# Patient Record
Sex: MALE | Race: BLACK OR AFRICAN AMERICAN | NOT HISPANIC OR LATINO | Employment: FULL TIME | URBAN - METROPOLITAN AREA
[De-identification: names, ages, dates, MRNs, and addresses within clinical notes are randomized per-mention and may not be internally consistent; named-entity substitution may affect disease eponyms.]

---

## 2017-01-24 ENCOUNTER — OFFICE VISIT - HEALTHEAST (OUTPATIENT)
Dept: FAMILY MEDICINE | Facility: CLINIC | Age: 37
End: 2017-01-24

## 2017-01-24 ENCOUNTER — RECORDS - HEALTHEAST (OUTPATIENT)
Dept: GENERAL RADIOLOGY | Facility: CLINIC | Age: 37
End: 2017-01-24

## 2017-01-24 DIAGNOSIS — M79.673 HEEL PAIN: ICD-10-CM

## 2017-01-24 DIAGNOSIS — M79.673 PAIN IN UNSPECIFIED FOOT: ICD-10-CM

## 2017-01-27 ENCOUNTER — AMBULATORY - HEALTHEAST (OUTPATIENT)
Dept: ADMINISTRATIVE | Facility: REHABILITATION | Age: 37
End: 2017-01-27

## 2017-01-27 ENCOUNTER — OFFICE VISIT - HEALTHEAST (OUTPATIENT)
Dept: PHYSICAL THERAPY | Facility: REHABILITATION | Age: 37
End: 2017-01-27

## 2017-01-27 DIAGNOSIS — M79.672 LEFT FOOT PAIN: ICD-10-CM

## 2017-01-27 DIAGNOSIS — M25.671 LIMITATION OF JOINT MOTION OF ANKLE, RIGHT: ICD-10-CM

## 2017-01-27 DIAGNOSIS — M25.672 LIMITATION OF JOINT MOTION OF ANKLE, LEFT: ICD-10-CM

## 2017-01-27 DIAGNOSIS — M72.2 PLANTAR FASCIITIS: ICD-10-CM

## 2017-01-30 ENCOUNTER — OFFICE VISIT - HEALTHEAST (OUTPATIENT)
Dept: PHYSICAL THERAPY | Facility: REHABILITATION | Age: 37
End: 2017-01-30

## 2017-01-30 DIAGNOSIS — M79.672 LEFT FOOT PAIN: ICD-10-CM

## 2017-01-30 DIAGNOSIS — M25.671 LIMITATION OF JOINT MOTION OF ANKLE, RIGHT: ICD-10-CM

## 2017-01-30 DIAGNOSIS — M25.672 LIMITATION OF JOINT MOTION OF ANKLE, LEFT: ICD-10-CM

## 2017-02-08 ENCOUNTER — OFFICE VISIT - HEALTHEAST (OUTPATIENT)
Dept: PHYSICAL THERAPY | Facility: REHABILITATION | Age: 37
End: 2017-02-08

## 2017-02-08 DIAGNOSIS — M25.672 LIMITATION OF JOINT MOTION OF ANKLE, LEFT: ICD-10-CM

## 2017-02-08 DIAGNOSIS — M25.671 LIMITATION OF JOINT MOTION OF ANKLE, RIGHT: ICD-10-CM

## 2017-02-08 DIAGNOSIS — M79.672 LEFT FOOT PAIN: ICD-10-CM

## 2017-02-27 ENCOUNTER — OFFICE VISIT - HEALTHEAST (OUTPATIENT)
Dept: FAMILY MEDICINE | Facility: CLINIC | Age: 37
End: 2017-02-27

## 2017-02-27 ENCOUNTER — AMBULATORY - HEALTHEAST (OUTPATIENT)
Dept: LAB | Facility: CLINIC | Age: 37
End: 2017-02-27

## 2017-02-27 DIAGNOSIS — Z13.9 SCREENING: ICD-10-CM

## 2017-02-27 DIAGNOSIS — R09.81 NASAL CONGESTION: ICD-10-CM

## 2017-02-27 DIAGNOSIS — E66.9 OBESITY, UNSPECIFIED: ICD-10-CM

## 2017-02-27 DIAGNOSIS — Z88.9 MULTIPLE ALLERGIES: ICD-10-CM

## 2017-02-27 DIAGNOSIS — Z00.00 ROUTINE GENERAL MEDICAL EXAMINATION AT A HEALTH CARE FACILITY: ICD-10-CM

## 2017-02-27 DIAGNOSIS — M72.2 PLANTAR FASCIITIS: ICD-10-CM

## 2017-02-27 LAB
CHOLEST SERPL-MCNC: 217 MG/DL
FASTING STATUS PATIENT QL REPORTED: YES
HDLC SERPL-MCNC: 34 MG/DL
LDLC SERPL CALC-MCNC: 158 MG/DL
TRIGL SERPL-MCNC: 125 MG/DL

## 2017-02-27 ASSESSMENT — MIFFLIN-ST. JEOR: SCORE: 2345.11

## 2017-03-01 ENCOUNTER — OFFICE VISIT - HEALTHEAST (OUTPATIENT)
Dept: FAMILY MEDICINE | Facility: CLINIC | Age: 37
End: 2017-03-01

## 2017-03-01 DIAGNOSIS — M72.2 PLANTAR FASCIITIS: ICD-10-CM

## 2017-03-08 ENCOUNTER — COMMUNICATION - HEALTHEAST (OUTPATIENT)
Dept: FAMILY MEDICINE | Facility: CLINIC | Age: 37
End: 2017-03-08

## 2017-03-13 ENCOUNTER — COMMUNICATION - HEALTHEAST (OUTPATIENT)
Dept: FAMILY MEDICINE | Facility: CLINIC | Age: 37
End: 2017-03-13

## 2017-03-13 DIAGNOSIS — R05.9 COUGH: ICD-10-CM

## 2017-03-14 ENCOUNTER — OFFICE VISIT - HEALTHEAST (OUTPATIENT)
Dept: FAMILY MEDICINE | Facility: CLINIC | Age: 37
End: 2017-03-14

## 2017-03-14 DIAGNOSIS — R50.9 FEVER: ICD-10-CM

## 2017-03-14 DIAGNOSIS — J11.1 INFLUENZA: ICD-10-CM

## 2017-03-14 DIAGNOSIS — J02.9 SORETHROAT: ICD-10-CM

## 2017-03-29 ENCOUNTER — OFFICE VISIT - HEALTHEAST (OUTPATIENT)
Dept: PODIATRY | Facility: CLINIC | Age: 37
End: 2017-03-29

## 2017-03-29 DIAGNOSIS — M72.2 PLANTAR FASCIITIS: ICD-10-CM

## 2017-03-29 DIAGNOSIS — B35.3 TINEA PEDIS, UNSPECIFIED LATERALITY: ICD-10-CM

## 2017-06-06 ENCOUNTER — COMMUNICATION - HEALTHEAST (OUTPATIENT)
Dept: HEALTH INFORMATION MANAGEMENT | Facility: CLINIC | Age: 37
End: 2017-06-06

## 2017-06-06 ENCOUNTER — COMMUNICATION - HEALTHEAST (OUTPATIENT)
Dept: TELEHEALTH | Facility: CLINIC | Age: 37
End: 2017-06-06

## 2017-08-30 ENCOUNTER — OFFICE VISIT - HEALTHEAST (OUTPATIENT)
Dept: FAMILY MEDICINE | Facility: CLINIC | Age: 37
End: 2017-08-30

## 2017-08-30 DIAGNOSIS — E78.5 DYSLIPIDEMIA: ICD-10-CM

## 2017-08-30 DIAGNOSIS — R51.9 HEADACHE: ICD-10-CM

## 2017-08-30 DIAGNOSIS — I10 HTN (HYPERTENSION): ICD-10-CM

## 2017-08-30 LAB
CHOLEST SERPL-MCNC: 226 MG/DL
FASTING STATUS PATIENT QL REPORTED: YES
HDLC SERPL-MCNC: 36 MG/DL
LDLC SERPL CALC-MCNC: 171 MG/DL
TRIGL SERPL-MCNC: 95 MG/DL

## 2017-09-14 ENCOUNTER — AMBULATORY - HEALTHEAST (OUTPATIENT)
Dept: NURSING | Facility: CLINIC | Age: 37
End: 2017-09-14

## 2017-09-14 DIAGNOSIS — I10 HTN (HYPERTENSION): ICD-10-CM

## 2017-09-27 ENCOUNTER — COMMUNICATION - HEALTHEAST (OUTPATIENT)
Dept: FAMILY MEDICINE | Facility: CLINIC | Age: 37
End: 2017-09-27

## 2017-09-27 DIAGNOSIS — I10 HTN (HYPERTENSION): ICD-10-CM

## 2017-10-04 ENCOUNTER — OFFICE VISIT - HEALTHEAST (OUTPATIENT)
Dept: FAMILY MEDICINE | Facility: CLINIC | Age: 37
End: 2017-10-04

## 2017-10-04 DIAGNOSIS — I10 HTN (HYPERTENSION): ICD-10-CM

## 2017-10-10 ENCOUNTER — COMMUNICATION - HEALTHEAST (OUTPATIENT)
Dept: HEALTH INFORMATION MANAGEMENT | Facility: CLINIC | Age: 37
End: 2017-10-10

## 2017-10-13 ENCOUNTER — COMMUNICATION - HEALTHEAST (OUTPATIENT)
Dept: FAMILY MEDICINE | Facility: CLINIC | Age: 37
End: 2017-10-13

## 2017-10-13 DIAGNOSIS — I10 HTN (HYPERTENSION): ICD-10-CM

## 2017-11-17 ENCOUNTER — COMMUNICATION - HEALTHEAST (OUTPATIENT)
Dept: FAMILY MEDICINE | Facility: CLINIC | Age: 37
End: 2017-11-17

## 2017-11-17 DIAGNOSIS — E78.5 DYSLIPIDEMIA: ICD-10-CM

## 2018-03-13 ENCOUNTER — OFFICE VISIT - HEALTHEAST (OUTPATIENT)
Dept: FAMILY MEDICINE | Facility: CLINIC | Age: 38
End: 2018-03-13

## 2018-03-13 ENCOUNTER — COMMUNICATION - HEALTHEAST (OUTPATIENT)
Dept: SCHEDULING | Facility: CLINIC | Age: 38
End: 2018-03-13

## 2018-03-13 DIAGNOSIS — R09.82 PND (POST-NASAL DRIP): ICD-10-CM

## 2018-03-13 DIAGNOSIS — J02.9 SORE THROAT: ICD-10-CM

## 2018-03-13 DIAGNOSIS — R05.3 COUGH, PERSISTENT: ICD-10-CM

## 2018-03-13 DIAGNOSIS — J00 ACUTE NASOPHARYNGITIS: ICD-10-CM

## 2018-03-13 LAB — DEPRECATED S PYO AG THROAT QL EIA: NORMAL

## 2018-03-14 LAB — GROUP A STREP BY PCR: NORMAL

## 2018-04-03 ENCOUNTER — OFFICE VISIT - HEALTHEAST (OUTPATIENT)
Dept: FAMILY MEDICINE | Facility: CLINIC | Age: 38
End: 2018-04-03

## 2018-04-03 DIAGNOSIS — Z91.09 ENVIRONMENTAL ALLERGIES: ICD-10-CM

## 2018-04-03 DIAGNOSIS — R07.0 THROAT PAIN: ICD-10-CM

## 2018-04-03 DIAGNOSIS — I10 HTN (HYPERTENSION): ICD-10-CM

## 2018-04-17 ENCOUNTER — RECORDS - HEALTHEAST (OUTPATIENT)
Dept: ADMINISTRATIVE | Facility: OTHER | Age: 38
End: 2018-04-17

## 2018-05-10 ENCOUNTER — COMMUNICATION - HEALTHEAST (OUTPATIENT)
Dept: FAMILY MEDICINE | Facility: CLINIC | Age: 38
End: 2018-05-10

## 2018-05-10 DIAGNOSIS — I10 HTN (HYPERTENSION): ICD-10-CM

## 2018-08-03 ENCOUNTER — OFFICE VISIT - HEALTHEAST (OUTPATIENT)
Dept: FAMILY MEDICINE | Facility: CLINIC | Age: 38
End: 2018-08-03

## 2018-08-03 DIAGNOSIS — M72.2 PLANTAR FASCIITIS: ICD-10-CM

## 2018-08-06 ENCOUNTER — COMMUNICATION - HEALTHEAST (OUTPATIENT)
Dept: SCHEDULING | Facility: CLINIC | Age: 38
End: 2018-08-06

## 2018-08-06 ENCOUNTER — COMMUNICATION - HEALTHEAST (OUTPATIENT)
Dept: FAMILY MEDICINE | Facility: CLINIC | Age: 38
End: 2018-08-06

## 2018-08-14 ENCOUNTER — OFFICE VISIT (OUTPATIENT)
Dept: PODIATRY | Facility: CLINIC | Age: 38
End: 2018-08-14
Payer: COMMERCIAL

## 2018-08-14 VITALS
DIASTOLIC BLOOD PRESSURE: 70 MMHG | SYSTOLIC BLOOD PRESSURE: 124 MMHG | BODY MASS INDEX: 47.74 KG/M2 | HEIGHT: 68 IN | WEIGHT: 315 LBS

## 2018-08-14 DIAGNOSIS — M79.671 INTRACTABLE RIGHT HEEL PAIN: Primary | ICD-10-CM

## 2018-08-14 PROCEDURE — 99204 OFFICE O/P NEW MOD 45 MIN: CPT | Performed by: PODIATRIST

## 2018-08-14 RX ORDER — PREDNISONE 5 MG/1
TABLET ORAL
Qty: 21 TABLET | Refills: 0 | Status: SHIPPED | OUTPATIENT
Start: 2018-08-14 | End: 2021-09-21

## 2018-08-14 NOTE — MR AVS SNAPSHOT
After Visit Summary   8/14/2018    Ilan Matthew    MRN: [id number]           Patient Information     Date Of Birth          1980        Visit Information        Provider Department      8/14/2018 1:15 PM Rishi Ackerman DPM [facility] [place] [facility]        Today's Diagnoses     Intractable right heel pain    -  1      Care Instructions    Thank you for choosing [facility]!    DR. ACKERMAN'S CLINIC LOCATIONS:   MONDAY - [place] TUESDAY - [place]   [address] [facility]  [address]   [place] [address] [place] [address]   [phone]       THURSDAY AM - [place] THURSDAY PM - [place]   [address]   [place] [address] [place], [place] [id number] [phone]       FRIDAY AM - [place] SET UP SURGERY: [phone] [address] APPOINTMENTS: [phone]   [place] [address] BILLING QUESTIONS: [phone] FAX NUMBER: [phone]     Follow Up: as needed    [facility] LOCATIONS  [facility]  [address]  [place] [address]  Phone: [phone]  Fax: [phone] [facility]  [address]  [place] [address]  Phone: [phone]   Fax: [phone]   [facility] [place]  [address]  [place] [address]  Phone: [phone]  Fax: [phone] [facility]  [address]  [place] [address]  Phone: [phone]   Fax: [phone]   [facility]   [address]  [place] [address]  Phone: [phone]   Fax: [phone] * Please call any location listed to make an appointment for a casting/fitting. Your referral was sent to their central office and they will all have the order on file.       PLANTAR FASCIITIS  Plantar fasciitis is often referred to as heel spurs or heel pain. Plantar fasciitis is a very  common problem that affects people of all foot shapes, age, weight and activity level. Pain may be in the arch or on the weight-bearing surface of the heel. The pain may come on without injury or identifiable cause. Pain is generally present when first getting out of bed in the morning or up from a seated break.     CAUSES  The plantar fascia is a dense fibrous band of tissue that stretches across the bottom surface of the foot. The fascia helps support the foot muscles and arch. Plantar fasciitis is thought to be caused by mechanical strain or overload. Frequent walking without shoes or wearing unsupportive shoes is thought to cause structural overload and ultimately inflammation of the plantar fascia. Some people have heel spurs that can be seen on x-ray. The heel spur is actually a minor component of plantar fascitis and is largely ignored.       SELF TREATMENT   The easiest solution is to stop walking around your home without shoes. Plantar fasciitis is largely a shoe problem. Shoes are either not being worn often enough or your current shoes are inadequate for your weight, foot structure or activity level. The majority of shoes on the market today are not sufficient to resist development of plantar fasciitis or to promote healing. Assume that your current shoes are inadequate and will need to be replaced. Even high quality shoes wear out with 6 months to one year of frequent use. Weight loss is another option. Losing ten pounds in the next two months may be enough to resolve the problem. Ice applied to the area of pain two to three times per day for ten minutes each session can be very helpful. This should continue until the problem resolves. Achilles tendon stretching is essential. Stretch multiple times daily to promote healing and to prevent recurrence in the future.     MEDICAL TREATMENT  Medical treatments often include custom arch supports, cortisone injections, physical therapy, splints to be worn in  bed, prescription medications and surgery. The home treatments listed above will be necessary regardless of these advanced medical treatments. Surgery is rarely needed but is very helpful in selected cases.     PROGNOSIS  Plantar fasciitis can last from one day to a lifetime. Some people get intermittent fascitis that is very short-lived. Others suffer daily for years. Excessive body weight, frequent bare foot walking, long hours on the feet, inadequate shoes, predisposing foot structures and excessive activity such as running are all potential issues that lead to chronic and/or recurring plantar fascitis. Having plantar fasciitis means that you are forever prone to this problem and will require modification of some of the above factors. Most people seek treatment within one to four months. Healing usually requires a similar one to four month time frame. Healing time is relative to the amount of effort spent treating the problem.   Plantar fasciitis is highly recurrent. Risk factors often continue, including return to bare foot walking, inadequate shoes, excessive body weight, excessive activities, etc. Your life style and foot structure may predispose you to recurrent plantar fasciitis. A daily prevention regimen can be very helpful. Ongoing use of shoe inserts, careful attention to appropriate shoes, daily Achilles stretching, etc. may prevent recurrence. Prompt attention at the earliest warning signs of heel pain can resolve the problem in as short as a few days.     EXERCISES    Stair Exercise: Step on the stairs with the ball of your foot and hold your position for at least 15 seconds, then slowly step down with the heels of your foot. You can do this daily and as often as you want.   Picking the Towel: Sit comfortably and then pick the towel up with your toes. You can use any object other than a towel as long as the material can be soft and you can pick it up with your toes.  Rolling the Bottle: Use a small  ball or frozen water bottle and then roll it around with your foot.   Flex the Toes: Sit comfortably and then flex your toes by pointing it towards the floor or towards your body. This will relax and flex your foot and exercise your plantar fascia, the calf, and the Achilles tendon. The inability of the foot to stretch often causes the bunching up of the plantar fascia area leading to the pain.  Calf/Achilles Stretching: Lay on you back and raise one foot, then point your toes towards the floor. See photo below:               Hold each stretch for 10 seconds. Stretch 10 times per set, three sets per day. Morning, afternoon and evening. If your heel pain is very severe in the morning, consider doing the first set of stretches before you get out of bed.    THERAPIES COVERED:  1.  Supportive Shoes: minimizing barefoot ambulation helps to provide cushion, padding and support to the ligament that is inflamed. Socks, flip flops, flats and some slippers are not typically sufficient to provide support. Shoes should be worn even indoors  2.  Insert/Orthotics: ones with an arch support built in to them provide further stress relief for the ligament. See the information below on recommended inserts.  3. Icing: using a frozen water bottle or orange, and rolling it along the bottom of the arch/heel can help to alleviate discomfort, and can act as a tissue massage to the painful, inflamed ligament.  There is evidence that shows icing at least three times daily can be beneficial  4.  Antiinflammatory (NSAID): Ibuprofen, Aleve, as well as Tylenol can be used to help decrease symptoms and improve pain levels. If you have high blood pressure, heart disease, stomach or kidney problems, use antiinflammatories sparingly. Tylenol should not be used if you have liver problems.   5. Activity Modifications: if there are certain things that you do, whether it's going barefoot or certain shoes/activities, you should try to minimize those  activities as much as possible until your symptoms are sufficiently resolved. Certainly, some activities, such as running on the treadmill, are easier to take a break from versus others, such as work or chores at home. If there are certain activities that hurt your heel, and you keep doing those activities that hurt your heel, your heel will keep hurting.  **If these initial therapies are insufficient, we have our tier 2 therapies that can more aggressively work to improve your symptoms and get you back to the activities that you enjoy!    OVER THE COUNTER INSERTS    SuperFeet   Sofsole Fit Spenco   Power Step   Walk-Fit Arch Cradles     Most of these can be found at your local [employer], sporting [employer] stores, or online.  **A good high quality over the counter insert should cost around $40-$50      [employer] [facility]  [address] [place] [address]  [phone]   [address]  [phone] [place]  [address]  [phone]   [place]  [address]  [phone]   [place]  [address]  [phone] [place]  [address]  [phone]   [place]  [address] [facility]  [phone]   [place]  [address] [facility] [address] [facility]  [phone] [place]  [address] [phone]           Body Mass Index (BMI)  Many things can cause foot and ankle problems. Foot structure, activity level, foot mechanics and injuries are common causes of pain. One very important issue that often goes unmentioned, is body weight. Extra weight can cause increased stress on muscles, ligaments, bones and tendons. Sometimes just a few extra pounds is all it takes to put one over her/his threshold. Without reducing that stress, it can be difficult to alleviate pain. Some people are uncomfortable addressing this issue, but we feel it is important for you to think about it. As Foot &  Ankle specialists, our job is addressing the lower extremity problem and  possible causes. Regarding extra body weight, we encourage patients to discuss diet and weight management plans with their primary care doctors. It is this team approach that gives you the best opportunity for pain relief and getting you back on your feet.            Follow-ups after your visit        Additional Services     ORTHOTICS REFERRAL       Please be aware that coverage of these services is subject to the terms and limitations of your health insurance plan.  Call member services at your health plan with any benefit or coverage questions.      Please bring the following to your appointment:    >>   Any x-rays, CTs or MRIs which have been performed.  Contact the facility where they were done to arrange for  prior to your scheduled appointment.  Any new CT, MRI or other procedures ordered by your specialist must be performed at a [facility] facility or coordinated by your clinic's referral office.    >>   List of current medications   >>   This referral request   >>   Any documents/labs given to you for this referral    ==This Referral PRINTS in the [facility] (ORTHOTICS & PROSTHETICS) Central scheduling office ==     The [facility] Central Scheduling staff will contact patient to arrange appointments. Central Scheduling Phone #:  [place], [place]  [phone]     Orthotics: Foot Orthotics - custom functional orthotics for heel/arch pain.                  Who to contact     If you have questions or need follow up information about today's clinic visit or your schedule please contact [facility] directly at [phone].  Normal or non-critical lab and imaging results will be communicated to you by MyChart, letter or phone within 4 business days after the clinic has received the results. If you do not hear from us within 7 days, please contact the clinic through MyChart or phone. If you have a critical or abnormal lab result, we will notify you by phone as soon as  "possible.  Submit refill requests through [employer] or call your pharmacy and they will forward the refill request to us. Please allow 3 business days for your refill to be completed.          Additional Information About Your Visit        [employer] Information     [employer] lets you send messages to your doctor, view your test results, renew your prescriptions, schedule appointments and more. To sign up, go to [email or web address] . Click on \"Log in\" on the left side of the screen, which will take you to the Welcome page. Then click on \"Sign up Now\" on the right side of the page.     You will be asked to enter the access code listed below, as well as some personal information. Please follow the directions to create your username and password.     Your access code is: [email or web address]  Expires: 2018  1:47 PM     Your access code will  in 90 days. If you need help or a new code, please call your [facility] clinic or [phone].        Care EveryWhere ID     This is your Care EveryWhere ID. This could be used by other organizations to access your [facility] medical records  [id number]        Your Vitals Were     Height BMI (Body Mass Index)                5' 8\" (1.727 m) 48.66 kg/m2           Blood Pressure from Last 3 Encounters:   18 124/70    Weight from Last 3 Encounters:   18 320 lb (145.2 kg)              We Performed the Following     ORTHOTICS REFERRAL          Today's Medication Changes          These changes are accurate as of 18  1:47 PM.  If you have any questions, ask your nurse or doctor.               Start taking these medicines.        Dose/Directions    predniSONE 5 MG tablet   Commonly known as:  DELTASONE   Used for:  Intractable right heel pain   Started by:  Rishi Ackerman DPM        Take six pills day 1, five pills day 2, four pills day 3, three pills day 4, two pills day 5, one pill day6   Quantity:  21 tablet   Refills:  0            Where to get your medicines    "   These medications were sent to [id number] - [facility], [place] - [address]  [address] [facility] [address]     Phone:  [phone]     predniSONE 5 MG tablet                Primary Care Provider Fax #    Physician No Ref-Primary [phone]       No address on file        Equal Access to Services     [facility] OHMERO : Hadii za ku hadasho Soomaali, waaxda luqadaha, qaybta kaalmada ademarisabelyada, trip wheatbrayansheron barreto. So [facility] [phone].    ATENCIÓN: Si habla español, tiene a rose disposición servicios gratuitos de asistencia lingüística. [facility] [phone].    We comply with applicable federal civil rights laws and [place] laws. We do not discriminate on the basis of race, color, national origin, age, disability, sex, sexual orientation, or gender identity.            Thank you!     Thank you for choosing [facility]  for your care. Our goal is always to provide you with excellent care. Hearing back from our patients is one way we can continue to improve our services. Please take a few minutes to complete the written survey that you may receive in the mail after your visit with us. Thank you!             Your Updated Medication List - Protect others around you: Learn how to safely use, store and throw away your medicines at www.disposemymeds.org.          This list is accurate as of 8/14/18  1:47 PM.  Always use your most recent med list.                   Brand Name Dispense Instructions for use Diagnosis    predniSONE 5 MG tablet    DELTASONE    21 tablet    Take six pills day 1, five pills day 2, four pills day 3, three pills day 4, two pills day 5, one pill day6    Intractable right heel pain

## 2018-08-14 NOTE — PATIENT INSTRUCTIONS
Thank you for choosing [facility]!    DR. FRENCH'S CLINIC LOCATIONS:   MONDAY - [place] TUESDAY - [place]   [address] [facility] [address] [address]   [place] [address] [place] [address]   [phone]       THURSDAY AM - [place] THURSDAY PM - [place]   [address] [facility] [address]   [place] [address] [place], [place] [address] [phone]       FRIDAY AM - [place] SET UP SURGERY: [phone] [address] APPOINTMENTS: [phone]   [place] [address] BILLING QUESTIONS: [phone] FAX NUMBER: [phone]     Follow Up: as needed    [facility] LOCATIONS  [facility]  [address] [facility] [address]  [place] [address]  Phone: [phone]  Fax: [phone] [facility]  [address]  [place] [address]  Phone: [phone]   Fax: [phone]   [facility]  [address]  [place] [address]  Phone: [phone]  Fax: [phone] [facility]  [address]  [place] [address]  Phone: [phone]   Fax: [phone]   [facility]   [address]  [place] [address]  Phone: [phone]   Fax: [phone] * Please call any location listed to make an appointment for a casting/fitting. Your referral was sent to their central office and they will all have the order on file.       PLANTAR FASCIITIS  Plantar fasciitis is often referred to as heel spurs or heel pain. Plantar fasciitis is a very common problem that affects people of all foot shapes, age, weight and activity level. Pain may be in the arch or on the weight-bearing surface of the heel. The pain may come on without injury or identifiable cause. Pain is generally present when first getting out of bed in the morning or up from a seated break.     CAUSES  The plantar fascia is a dense fibrous  band of tissue that stretches across the bottom surface of the foot. The fascia helps support the foot muscles and arch. Plantar fasciitis is thought to be caused by mechanical strain or overload. Frequent walking without shoes or wearing unsupportive shoes is thought to cause structural overload and ultimately inflammation of the plantar fascia. Some people have heel spurs that can be seen on x-ray. The heel spur is actually a minor component of plantar fascitis and is largely ignored.       SELF TREATMENT   The easiest solution is to stop walking around your home without shoes. Plantar fasciitis is largely a shoe problem. Shoes are either not being worn often enough or your current shoes are inadequate for your weight, foot structure or activity level. The majority of shoes on the market today are not sufficient to resist development of plantar fasciitis or to promote healing. Assume that your current shoes are inadequate and will need to be replaced. Even high quality shoes wear out with 6 months to one year of frequent use. Weight loss is another option. Losing ten pounds in the next two months may be enough to resolve the problem. Ice applied to the area of pain two to three times per day for ten minutes each session can be very helpful. This should continue until the problem resolves. Achilles tendon stretching is essential. Stretch multiple times daily to promote healing and to prevent recurrence in the future.     MEDICAL TREATMENT  Medical treatments often include custom arch supports, cortisone injections, physical therapy, splints to be worn in bed, prescription medications and surgery. The home treatments listed above will be necessary regardless of these advanced medical treatments. Surgery is rarely needed but is very helpful in selected cases.     PROGNOSIS  Plantar fasciitis can last from one day to a lifetime. Some people get intermittent fascitis that is very short-lived. Others suffer daily for  years. Excessive body weight, frequent bare foot walking, long hours on the feet, inadequate shoes, predisposing foot structures and excessive activity such as running are all potential issues that lead to chronic and/or recurring plantar fascitis. Having plantar fasciitis means that you are forever prone to this problem and will require modification of some of the above factors. Most people seek treatment within one to four months. Healing usually requires a similar one to four month time frame. Healing time is relative to the amount of effort spent treating the problem.   Plantar fasciitis is highly recurrent. Risk factors often continue, including return to bare foot walking, inadequate shoes, excessive body weight, excessive activities, etc. Your life style and foot structure may predispose you to recurrent plantar fasciitis. A daily prevention regimen can be very helpful. Ongoing use of shoe inserts, careful attention to appropriate shoes, daily Achilles stretching, etc. may prevent recurrence. Prompt attention at the earliest warning signs of heel pain can resolve the problem in as short as a few days.     EXERCISES    Stair Exercise: Step on the stairs with the ball of your foot and hold your position for at least 15 seconds, then slowly step down with the heels of your foot. You can do this daily and as often as you want.   Picking the Towel: Sit comfortably and then pick the towel up with your toes. You can use any object other than a towel as long as the material can be soft and you can pick it up with your toes.  Rolling the Bottle: Use a small ball or frozen water bottle and then roll it around with your foot.   Flex the Toes: Sit comfortably and then flex your toes by pointing it towards the floor or towards your body. This will relax and flex your foot and exercise your plantar fascia, the calf, and the Achilles tendon. The inability of the foot to stretch often causes the bunching up of the plantar  fascia area leading to the pain.  Calf/Achilles Stretching: Lay on you back and raise one foot, then point your toes towards the floor. See photo below:               Hold each stretch for 10 seconds. Stretch 10 times per set, three sets per day. Morning, afternoon and evening. If your heel pain is very severe in the morning, consider doing the first set of stretches before you get out of bed.    THERAPIES COVERED:  1.  Supportive Shoes: minimizing barefoot ambulation helps to provide cushion, padding and support to the ligament that is inflamed. Socks, flip flops, flats and some slippers are not typically sufficient to provide support. Shoes should be worn even indoors  2.  Insert/Orthotics: ones with an arch support built in to them provide further stress relief for the ligament. See the information below on recommended inserts.  3. Icing: using a frozen water bottle or orange, and rolling it along the bottom of the arch/heel can help to alleviate discomfort, and can act as a tissue massage to the painful, inflamed ligament.  There is evidence that shows icing at least three times daily can be beneficial  4.  Antiinflammatory (NSAID): Ibuprofen, Aleve, as well as Tylenol can be used to help decrease symptoms and improve pain levels. If you have high blood pressure, heart disease, stomach or kidney problems, use antiinflammatories sparingly. Tylenol should not be used if you have liver problems.   5. Activity Modifications: if there are certain things that you do, whether it's going barefoot or certain shoes/activities, you should try to minimize those activities as much as possible until your symptoms are sufficiently resolved. Certainly, some activities, such as running on the treadmill, are easier to take a break from versus others, such as work or chores at home. If there are certain activities that hurt your heel, and you keep doing those activities that hurt your heel, your heel will keep hurting.  **If these  initial therapies are insufficient, we have our tier 2 therapies that can more aggressively work to improve your symptoms and get you back to the activities that you enjoy!    OVER THE COUNTER INSERTS    SuperFeet   Sofsole Fit Spenco   Power Step   Walk-Fit Arch Cradles     Most of these can be found at your local [employer], sporting [employer] stores, or online.  **A good high quality over the counter insert should cost around $40-$50      [facility] LOCATIONS    [place]  [address] [facility]  [phone]   [address]  [phone] [place]  [address] [facility]  [phone]   [place]  [address]  [phone]   [place]  [address] [facility]  [phone] [place]  [address]  [phone]   [place]  [address] [place] [address]  [phone]   [place]  [address] [facility]  [phone] [place]  [address] [phone]           Body Mass Index (BMI)  Many things can cause foot and ankle problems. Foot structure, activity level, foot mechanics and injuries are common causes of pain. One very important issue that often goes unmentioned, is body weight. Extra weight can cause increased stress on muscles, ligaments, bones and tendons. Sometimes just a few extra pounds is all it takes to put one over her/his threshold. Without reducing that stress, it can be difficult to alleviate pain. Some people are uncomfortable addressing this issue, but we feel it is important for you to think about it. As Foot &  Ankle specialists, our job is addressing the lower extremity problem and possible causes. Regarding extra body weight, we encourage patients to discuss diet and weight management plans with their primary care doctors. It is this team approach that gives you the best opportunity for pain relief and getting you back on your feet.

## 2018-08-14 NOTE — PROGRESS NOTES
"Foot & Ankle Surgery  August 14, 2018    CC: right heel pain    I was asked to see Ilan Matthew regarding the chief complaint by:  Dr. Dhillon    HPI:  Pt is a 38 year old male who presents with above complaint.  Right heel pain x 7 months.  He's looking for a \"cortisone shot + orthodic\".  He states he received a steroid injection in his R \"ankle\" 2 weeks ago, indicating this was at the the bottom of the heel.  Pain was 10/10.  It's better now but still very tender and it changes how he walks.  Heel pain R , describes morning pain and post-static dyskinesia.  He had plantar fasciitis left lower extremity, xrays showed a plantar heel spur, states he had multiple injections in a short period of time and his pain improved.  Pain 7/10 \"when I walk\", worse with walking/exercise.     ROS:   Pos for CC.  The patient denies current nausea, vomiting, chills, fevers, belly pain, calf pain, chest pain or SOB.  Complete remainder of ROS is otherwise neg.    VITALS:    Vitals:    08/14/18 1323   BP: 124/70   Weight: 320 lb (145.2 kg)   Height: 5' 8\" (1.727 m)       PMH:  No past medical history on file.    SXHX:  No past surgical history on file.     MEDS:    No current outpatient prescriptions on file.     No current facility-administered medications for this visit.        ALL:   Not on File    FMH:  No family history on file.    SocHx:    Social History     Social History     Marital status:      Spouse name: N/A     Number of children: N/A     Years of education: N/A     Occupational History     Not on file.     Social History Main Topics     Smoking status: Never Smoker     Smokeless tobacco: Never Used     Alcohol use Not on file     Drug use: Not on file     Sexual activity: Not on file     Other Topics Concern     Not on file     Social History Narrative     No narrative on file           EXAMINATION:  Gen:   No apparent distress  Neuro:   A&Ox3, no deficits  Psych:    Answering questions appropriately for age and " situation with normal affect  Head:    NCAT  Eye:    Visual scanning without deficit  Ear:    Response to auditory stimuli wnl  Lung:    Non-labored breathing on RA noted  Abd:    NTND per patient report  Lymph:    Neg for pitting/non-pitting edema BLE  Vasc:    Pulses palpable, CFT minimally delayed  Neuro:    Light touch sensation intact to all sensory nerve distributions without paresthesias  Derm:    Neg for nodules, lesions or ulcerations  MSK:    Right lower extremity - pain with lateral calcaneal compression >> plantar heel pain.  Some pain along arch with dorsiflexion of hallux.  Pes planus.  Otherwise neg today  Calf:    Neg for redness, swelling or tenderness    Assessment:  38 year old male with calcaneal stress reaction with plantar fasciitis in setting of pes planus right lower extremity       Plan:  Discussed etiologies, anatomy and options  1.  Calcaneal stress reaction with plantar fasciitis in setting of pes planus right lower extremity   -Regarding the plantar fasciitis, the Plantar Fasciitis handout was dispensed and discussed.  We talked about stretching, resting/activity modification, icing, NSAID/tylenol use as tolerated, inserts, supportive/comfortable shoes and minimizing shoeless walking.    -discussed Achilles, plantar fascial and hamstring stretches  -I advised no injections today.  We discussed that typical protocol is spacing the injections out every 3-4 months, and since his last injection was 2 weeks ago, strongly advised no injection to minimize possibility of rupture  -regarding the calcaneal compression pain, discussed calcaneal stress reaction and how this wound not respond to a steroid injection.  Rather, advised PO steroid course and a walking cast boot.  -Rx for PO steroid course; declined boot  -Orthotic Lab referral for custom orthotics    Follow up:  Prn based on steroid results or sooner with acute issues      Patient's medical history was reviewed today    Body mass index  is 48.66 kg/(m^2).  Weight management plan: Patient was referred to their PCP to discuss a diet and exercise plan.        Rishi Ackerman DPM [facility] FACFA[facility]  Podiatric Foot & Ankle Surgeon  [facility]  [phone]

## 2018-08-27 DIAGNOSIS — M21.41 BILATERAL PES PLANUS: Primary | ICD-10-CM

## 2018-08-27 DIAGNOSIS — M21.42 BILATERAL PES PLANUS: Primary | ICD-10-CM

## 2019-02-28 ENCOUNTER — COMMUNICATION - HEALTHEAST (OUTPATIENT)
Dept: FAMILY MEDICINE | Facility: CLINIC | Age: 39
End: 2019-02-28

## 2019-02-28 DIAGNOSIS — E78.5 DYSLIPIDEMIA: ICD-10-CM

## 2019-02-28 DIAGNOSIS — I10 HTN (HYPERTENSION): ICD-10-CM

## 2019-03-26 ENCOUNTER — COMMUNICATION - HEALTHEAST (OUTPATIENT)
Dept: FAMILY MEDICINE | Facility: CLINIC | Age: 39
End: 2019-03-26

## 2019-03-26 DIAGNOSIS — I10 HTN (HYPERTENSION): ICD-10-CM

## 2019-04-03 ENCOUNTER — OFFICE VISIT - HEALTHEAST (OUTPATIENT)
Dept: FAMILY MEDICINE | Facility: CLINIC | Age: 39
End: 2019-04-03

## 2019-04-03 DIAGNOSIS — E78.5 DYSLIPIDEMIA: ICD-10-CM

## 2019-04-03 DIAGNOSIS — M72.2 PLANTAR FASCIITIS OF RIGHT FOOT: ICD-10-CM

## 2019-04-03 DIAGNOSIS — E66.01 MORBID OBESITY (H): ICD-10-CM

## 2019-04-03 DIAGNOSIS — Z00.00 ROUTINE GENERAL MEDICAL EXAMINATION AT A HEALTH CARE FACILITY: ICD-10-CM

## 2019-04-03 DIAGNOSIS — I10 ESSENTIAL HYPERTENSION: ICD-10-CM

## 2019-04-03 LAB
ALBUMIN SERPL-MCNC: 3.7 G/DL (ref 3.5–5)
ALP SERPL-CCNC: 89 U/L (ref 45–120)
ALT SERPL W P-5'-P-CCNC: 45 U/L (ref 0–45)
ANION GAP SERPL CALCULATED.3IONS-SCNC: 13 MMOL/L (ref 5–18)
AST SERPL W P-5'-P-CCNC: 43 U/L (ref 0–40)
BILIRUB SERPL-MCNC: 0.9 MG/DL (ref 0–1)
BUN SERPL-MCNC: 15 MG/DL (ref 8–22)
CALCIUM SERPL-MCNC: 9.4 MG/DL (ref 8.5–10.5)
CHLORIDE BLD-SCNC: 104 MMOL/L (ref 98–107)
CHOLEST SERPL-MCNC: 149 MG/DL
CO2 SERPL-SCNC: 23 MMOL/L (ref 22–31)
CREAT SERPL-MCNC: 1 MG/DL (ref 0.7–1.3)
FASTING STATUS PATIENT QL REPORTED: YES
GFR SERPL CREATININE-BSD FRML MDRD: >60 ML/MIN/1.73M2
GLUCOSE BLD-MCNC: 94 MG/DL (ref 70–125)
HDLC SERPL-MCNC: 39 MG/DL
LDLC SERPL CALC-MCNC: 98 MG/DL
POTASSIUM BLD-SCNC: 4 MMOL/L (ref 3.5–5)
PROT SERPL-MCNC: 6.9 G/DL (ref 6–8)
SODIUM SERPL-SCNC: 140 MMOL/L (ref 136–145)
TRIGL SERPL-MCNC: 61 MG/DL

## 2019-04-03 ASSESSMENT — MIFFLIN-ST. JEOR: SCORE: 2242.2

## 2019-04-09 ENCOUNTER — OFFICE VISIT - HEALTHEAST (OUTPATIENT)
Dept: FAMILY MEDICINE | Facility: CLINIC | Age: 39
End: 2019-04-09

## 2019-04-09 DIAGNOSIS — M72.2 PLANTAR FASCIITIS: ICD-10-CM

## 2019-04-10 ENCOUNTER — COMMUNICATION - HEALTHEAST (OUTPATIENT)
Dept: FAMILY MEDICINE | Facility: CLINIC | Age: 39
End: 2019-04-10

## 2019-04-11 ENCOUNTER — COMMUNICATION - HEALTHEAST (OUTPATIENT)
Dept: FAMILY MEDICINE | Facility: CLINIC | Age: 39
End: 2019-04-11

## 2019-05-04 ENCOUNTER — COMMUNICATION - HEALTHEAST (OUTPATIENT)
Dept: FAMILY MEDICINE | Facility: CLINIC | Age: 39
End: 2019-05-04

## 2019-05-04 DIAGNOSIS — I10 HTN (HYPERTENSION): ICD-10-CM

## 2019-05-22 ENCOUNTER — COMMUNICATION - HEALTHEAST (OUTPATIENT)
Dept: FAMILY MEDICINE | Facility: CLINIC | Age: 39
End: 2019-05-22

## 2019-05-22 DIAGNOSIS — I10 HTN (HYPERTENSION): ICD-10-CM

## 2019-05-22 DIAGNOSIS — E66.01 MORBID OBESITY (H): ICD-10-CM

## 2019-06-03 ENCOUNTER — COMMUNICATION - HEALTHEAST (OUTPATIENT)
Dept: FAMILY MEDICINE | Facility: CLINIC | Age: 39
End: 2019-06-03

## 2019-06-03 DIAGNOSIS — E66.01 MORBID OBESITY (H): ICD-10-CM

## 2019-06-12 ENCOUNTER — COMMUNICATION - HEALTHEAST (OUTPATIENT)
Dept: FAMILY MEDICINE | Facility: CLINIC | Age: 39
End: 2019-06-12

## 2019-06-12 DIAGNOSIS — E78.5 DYSLIPIDEMIA: ICD-10-CM

## 2019-10-24 ENCOUNTER — COMMUNICATION - HEALTHEAST (OUTPATIENT)
Dept: FAMILY MEDICINE | Facility: CLINIC | Age: 39
End: 2019-10-24

## 2019-10-30 ENCOUNTER — AMBULATORY - HEALTHEAST (OUTPATIENT)
Dept: NURSING | Facility: CLINIC | Age: 39
End: 2019-10-30

## 2020-01-31 ENCOUNTER — COMMUNICATION - HEALTHEAST (OUTPATIENT)
Dept: SCHEDULING | Facility: CLINIC | Age: 40
End: 2020-01-31

## 2020-01-31 DIAGNOSIS — R05.9 COUGH: ICD-10-CM

## 2020-02-03 ENCOUNTER — COMMUNICATION - HEALTHEAST (OUTPATIENT)
Dept: FAMILY MEDICINE | Facility: CLINIC | Age: 40
End: 2020-02-03

## 2020-02-18 ENCOUNTER — AMBULATORY - HEALTHEAST (OUTPATIENT)
Dept: FAMILY MEDICINE | Facility: CLINIC | Age: 40
End: 2020-02-18

## 2020-03-11 ENCOUNTER — HEALTH MAINTENANCE LETTER (OUTPATIENT)
Age: 40
End: 2020-03-11

## 2020-07-20 ENCOUNTER — COMMUNICATION - HEALTHEAST (OUTPATIENT)
Dept: FAMILY MEDICINE | Facility: CLINIC | Age: 40
End: 2020-07-20

## 2020-07-20 DIAGNOSIS — I10 HTN (HYPERTENSION): ICD-10-CM

## 2020-07-20 DIAGNOSIS — E78.5 DYSLIPIDEMIA: ICD-10-CM

## 2020-08-29 ENCOUNTER — OFFICE VISIT - HEALTHEAST (OUTPATIENT)
Dept: FAMILY MEDICINE | Facility: CLINIC | Age: 40
End: 2020-08-29

## 2020-08-29 ENCOUNTER — COMMUNICATION - HEALTHEAST (OUTPATIENT)
Dept: SCHEDULING | Facility: CLINIC | Age: 40
End: 2020-08-29

## 2020-08-29 DIAGNOSIS — H65.01 NON-RECURRENT ACUTE SEROUS OTITIS MEDIA OF RIGHT EAR: ICD-10-CM

## 2020-08-29 DIAGNOSIS — H60.391 INFECTIVE OTITIS EXTERNA, RIGHT: ICD-10-CM

## 2020-08-29 DIAGNOSIS — H61.21 IMPACTED CERUMEN OF RIGHT EAR: ICD-10-CM

## 2020-12-22 ENCOUNTER — COMMUNICATION - HEALTHEAST (OUTPATIENT)
Dept: FAMILY MEDICINE | Facility: CLINIC | Age: 40
End: 2020-12-22

## 2020-12-22 DIAGNOSIS — I10 HTN (HYPERTENSION): ICD-10-CM

## 2021-01-03 ENCOUNTER — HEALTH MAINTENANCE LETTER (OUTPATIENT)
Age: 41
End: 2021-01-03

## 2021-03-23 ENCOUNTER — COMMUNICATION - HEALTHEAST (OUTPATIENT)
Dept: ADMINISTRATIVE | Facility: CLINIC | Age: 41
End: 2021-03-23

## 2021-04-25 ENCOUNTER — HEALTH MAINTENANCE LETTER (OUTPATIENT)
Age: 41
End: 2021-04-25

## 2021-05-27 NOTE — TELEPHONE ENCOUNTER
Fax received from [facility] requesting Prior Authorization    Medication Name: Belviq 10mg tablet    Insurance Plan: [employer]   PBM:    Patient ID Number: [id number]    Please start PA process

## 2021-05-27 NOTE — TELEPHONE ENCOUNTER
RN cannot approve Refill Request    RN can NOT refill this medication overdue for office visits and/or labs.    Ayden Mcmanus, [facility] Triage/Med Refill 3/26/2019    Requested Prescriptions   Pending Prescriptions Disp Refills     hydroCHLOROthiazide (MICROZIDE) 12.5 mg capsule [Pharmacy Med Name: HYDROCHLOROTHIAZIDE 12.5MG CAP] 30 capsule 0     Sig: TAKE 1 CAPSULE BY MOUTH ONCE DAILY PLEASE  FOLLOW  UP  WITH  YOUR  PRIMARY  CARE  PHYSICIAN  FOR  LABS  AND  MORE  REFILLS.    Diuretics/Combination Diuretics Refill Protocol  Failed - 3/26/2019  1:20 PM       Failed - Serum Potassium in past 12 months     No results found for: LN-POTASSIUM         Failed - Serum Sodium in past 12 months     No results found for: LN-SODIUM         Failed - Serum Creatinine in past 12 months     Creatinine   Date Value Ref Range Status   10/04/2017 0.95 0.70 - 1.30 mg/dL Final            Passed - Visit with PCP or prescribing provider visit in past 12 months    Last office visit with prescriber/PCP: Visit date not found OR same dept: 4/3/2018 Jimy Grace MD OR same specialty: 8/3/2018 Stef Stevens MD  Last physical: Visit date not found Last MTM visit: Visit date not found   Next visit within 3 mo: Visit date not found  Next physical within 3 mo: Visit date not found  Prescriber OR PCP: Janiya Salas MD  Last diagnosis associated with med order: 1. HTN (hypertension)  - hydroCHLOROthiazide (MICROZIDE) 12.5 mg capsule [Pharmacy Med Name: HYDROCHLOROTHIAZIDE 12.5MG CAP]; TAKE 1 CAPSULE BY MOUTH ONCE DAILY PLEASE  FOLLOW  UP  WITH  YOUR  PRIMARY  CARE  PHYSICIAN  FOR  LABS  AND  MORE  REFILLS.  Dispense: 30 capsule; Refill: 0    If protocol passes may refill for 12 months if within 3 months of last provider visit (or a total of 15 months).            Passed - Blood pressure on file in past 12 months    BP Readings from Last 1 Encounters:   08/03/18 126/78

## 2021-05-27 NOTE — TELEPHONE ENCOUNTER
Central PA team  [phone]  Pool: HE PA MED ([id number])          PA has been initiated.       PA form completed and faxed insurance via Cover My Meds     Key:   [id number]     Medication:  : Belviq 10mg tablet:     Insurance:  [employer]        Response will be received via fax and may take up to 5-10 business days depending on plan

## 2021-05-27 NOTE — PROGRESS NOTES
ASSESSMENT/PLAN:  Plantar fasciitis, right  Decadron steroid injection 40 mg per mill 2 cc, 1 cc of 2% lidocaine without epi, 1 cc of 0.25% Marcaine was injected on the medial side of the right foot.  Patient tolerated the procedure.  Wound care discussed.  Avoid excessive weightbearing activities today.  May continue with over-the-counter analgesics as needed    SUBJECTIVE:    Ilan Matthew is a 39 y.o. male who came in today for treatment of plantar fasciitis of his right foot.  The patient has had steroid injections to both feet in the previous years for plantar fasciitis.  Over the last several months he has been having pain of his right foot.  Pain is worse in the morning upon a weightbearing.  It is getting worse for him.    Review of Systems (except those mentioned above)  Constitutional: Negative.   HENT: Negative.   Eyes: Negative.   Respiratory: Negative.   Cardiovascular: Negative.   Gastrointestinal: Negative.   Endocrine: Negative.   Genitourinary: Negative.   Musculoskeletal: Negative.   Skin: Negative.   Allergic/Immunologic: Negative.   Neurological: Negative.   Hematological: Negative.   Psychiatric/Behavioral: Negative.     Patient Active Problem List    Diagnosis Date Noted     Essential hypertension 04/03/2019     Morbid obesity (H) 04/03/2019     Obesity      Hypertrophy Of Breast      Allergies   Allergen Reactions     Fish Oil [Omega-3 Fatty Acids] Other (See Comments)     Swollen tongue     Current Outpatient Medications   Medication Sig Dispense Refill     clotrimazole-betamethasone (LOTRISONE) cream Apply topically 2 (two) times a day. 45 g 0     hydroCHLOROthiazide (MICROZIDE) 12.5 mg capsule TAKE 1 CAPSULE BY MOUTH ONCE DAILY PLEASE  FOLLOW  UP  WITH  YOUR  PRIMARY  CARE  PHYSICIAN  FOR  LABS  AND  MORE  REFILLS. 30 capsule 0     lisinopril (PRINIVIL,ZESTRIL) 20 MG tablet Take 1 tablet (20 mg total) by mouth daily. 90 tablet 3     loratadine-pseudoephedrine (CLARITIN-D 12 HOUR) 5-120 mg  Tb12 Take 1 tablet by mouth 2 (two) times a day. 30 tablet 0     lorcaserin 10 mg Tab Take 10 mg by mouth 2 (two) times a day. Start with 1 tab daily for 1 week then increase to 1 tab BID 60 tablet 0     Multivits/Iron Fum/FA/D3/Lycop (MULTI FOR HIM ORAL) Take by mouth.       simvastatin (ZOCOR) 20 MG tablet TAKE ONE TABLET BY MOUTH IN THE EVENING 90 tablet 0     Current Facility-Administered Medications   Medication Dose Route Frequency Provider Last Rate Last Dose     acetaminophen tablet 975 mg (TYLENOL)  975 mg Oral Q4H PRN Jimy Grace MD   975 mg at 03/14/17 1432     No past medical history on file.  No past surgical history on file.  Social History     Socioeconomic History     Marital status: Single     Spouse name: None     Number of children: None     Years of education: None     Highest education level: None   Occupational History     Occupation: health care   Social Needs     Financial resource strain: None     Food insecurity:     Worry: None     Inability: None     Transportation needs:     Medical: None     Non-medical: None   Tobacco Use     Smoking status: Never Smoker     Smokeless tobacco: Never Used   Substance and Sexual Activity     Alcohol use: None     Drug use: None     Sexual activity: None   Lifestyle     Physical activity:     Days per week: None     Minutes per session: None     Stress: None   Relationships     Social connections:     Talks on phone: None     Gets together: None     Attends Oriental orthodox service: None     Active member of club or organization: None     Attends meetings of clubs or organizations: None     Relationship status: None     Intimate partner violence:     Fear of current or ex partner: None     Emotionally abused: None     Physically abused: None     Forced sexual activity: None   Other Topics Concern     None   Social History Narrative     None     Family History   Problem Relation Age of Onset     Hypertension Mother          OBJECTIVE:    Vitals:     04/09/19 1134   BP: 122/70   Patient Site: Left Arm   Patient Position: Sitting   Cuff Size: Adult Large   Pulse: 76   Weight: (!) 304 lb (137.9 kg)     Body mass index is 46.91 kg/m .    Physical Exam:  Constitutional: Patient is oriented to person, place, and time. Patient appears well-developed and well-nourished. No distress.   Referred is normal-appearing without swelling, deformity, ecchymosis.  There is tenderness palpation of the heel.    Patient informed of risks, benefits, and possible complications of injection therapy including infection, bleeding, steroid flare, failure to obtain relief, allergic reaction, tendon rupture, and atrophy.  Informed consent was signed.  Medial aspect of the heel was identified with my thumb. Identified and marked with tip of needle cap. Area prepped with alcohol swab. 1ml of 0.25% bupivacaine, 1ml of 2% lidocaine, and 2ml of depo-medrol 40mg/ml was administered. There were no complications. Patient tolerated procedure well. Post injection care and warning signs counseled.

## 2021-05-28 NOTE — TELEPHONE ENCOUNTER
Refill Approved    Rx renewed per Medication Renewal Policy. Medication was last renewed on 3/26/19.    Ov: 4/9/19    Bria Rosa, [facility] Triage/Med Refill 5/5/2019     Requested Prescriptions   Pending Prescriptions Disp Refills     hydroCHLOROthiazide (MICROZIDE) 12.5 mg capsule [Pharmacy Med Name: HYDROCHLOROTHIAZIDE 12.5MG CAP] 30 capsule 0     Sig: TAKE 1 CAPSULE BY MOUTH ONCE DAILY . APPOINTMENT REQUIRED FOR FUTURE REFILLS (NEED  LABS)       Diuretics/Combination Diuretics Refill Protocol  Passed - 5/4/2019 11:46 AM        Passed - Visit with PCP or prescribing provider visit in past 12 months     Last office visit with prescriber/PCP: Visit date not found OR same dept: 4/9/2019 Jimy Grace MD OR same specialty: 4/9/2019 Jimy Grace MD  Last physical: Visit date not found Last MTM visit: Visit date not found   Next visit within 3 mo: Visit date not found  Next physical within 3 mo: Visit date not found  Prescriber OR PCP: Juaquin Melgar MD  Last diagnosis associated with med order: 1. HTN (hypertension)  - hydroCHLOROthiazide (MICROZIDE) 12.5 mg capsule [Pharmacy Med Name: HYDROCHLOROTHIAZIDE 12.5MG CAP]; TAKE 1 CAPSULE BY MOUTH ONCE DAILY . APPOINTMENT REQUIRED FOR FUTURE REFILLS (NEED  LABS)  Dispense: 30 capsule; Refill: 0    If protocol passes may refill for 12 months if within 3 months of last provider visit (or a total of 15 months).             Passed - Serum Potassium in past 12 months      Lab Results   Component Value Date    Potassium 4.0 04/03/2019             Passed - Serum Sodium in past 12 months      Lab Results   Component Value Date    Sodium 140 04/03/2019             Passed - Blood pressure on file in past 12 months     BP Readings from Last 1 Encounters:   04/09/19 122/70             Passed - Serum Creatinine in past 12 months      Creatinine   Date Value Ref Range Status   04/03/2019 1.00 0.70 - 1.30 mg/dL Final

## 2021-05-29 NOTE — TELEPHONE ENCOUNTER
RN cannot approve Refill Request    RN can NOT refill this medication med is not covered by policy/route to provider. Last office visit: 4/9/2019 Jimy Grace MD Last Physical: 4/3/2019 Last MTM visit: Visit date not found Last visit same specialty: 4/9/2019 Jimy Grace MD.  Next visit within 3 mo: Visit date not found  Next physical within 3 mo: Visit date not found      Estella Wilson [facility] Triage/Med Refill 5/24/2019    Requested Prescriptions   Pending Prescriptions Disp Refills     BELVIQ 10 mg Tab [Pharmacy Med Name: BELVIQ 10 MG TAB] 60 tablet 0     Sig: START WITH 1 TABLET DAILY FOR 1 WEEKS, THEN INCREASE TO 1 TABLET TWCIE DAILY       There is no refill protocol information for this order      Signed Prescriptions Disp Refills    lisinopril (PRINIVIL,ZESTRIL) 20 MG tablet 90 tablet 2     Sig: TAKE 1 TABLET BY MOUTH ONCE DAILY       Ace Inhibitors Refill Protocol Passed - 5/22/2019  2:22 PM        Passed - PCP or prescribing provider visit in past 12 months       Last office visit with prescriber/PCP: 4/9/2019 Jimy Grace MD OR same dept: 4/9/2019 Jimy Grace MD OR same specialty: 4/9/2019 Jimy Grace MD  Last physical: 4/3/2019 Last MTM visit: Visit date not found   Next visit within 3 mo: Visit date not found  Next physical within 3 mo: Visit date not found  Prescriber OR PCP: Jimy Oquendo MD  Last diagnosis associated with med order: 1. HTN (hypertension)  - lisinopril (PRINIVIL,ZESTRIL) 20 MG tablet; TAKE 1 TABLET BY MOUTH ONCE DAILY  Dispense: 90 tablet; Refill: 2    2. Morbid obesity (H)  - BELVIQ 10 mg Tab [Pharmacy Med Name: BELVIQ 10 MG TAB]; START WITH 1 TABLET DAILY FOR 1 WEEKS, THEN INCREASE TO 1 TABLET TWCIE DAILY  Dispense: 60 tablet; Refill: 0    If protocol passes may refill for 12 months if within 3 months of last provider visit (or a total of 15 months).             Passed -  Serum Potassium in past 12 months     Lab Results   Component Value Date    Potassium 4.0 04/03/2019             Passed - Blood pressure filed in past 12 months     BP Readings from Last 1 Encounters:   04/09/19 122/70             Passed - Serum Creatinine in past 12 months     Creatinine   Date Value Ref Range Status   04/03/2019 1.00 0.70 - 1.30 mg/dL Final              Pt called in states he has to take Belviq medication for 12 weeks.  Pt states he is took the medication for 4 weeks and ask the PCP to send the Rx to the pharmacy.      Please advise

## 2021-05-29 NOTE — TELEPHONE ENCOUNTER
Controlled Substance Refill Request  Medication:   Requested Prescriptions     Pending Prescriptions Disp Refills     lisinopril (PRINIVIL,ZESTRIL) 20 MG tablet [Pharmacy Med Name: LISINOPRIL 20MG     TAB] 30 tablet 11     Sig: TAKE 1 TABLET BY MOUTH ONCE DAILY     BELVIQ 10 mg Tab [Pharmacy Med Name: BELVIQ 10 MG TAB] 60 tablet 0     Sig: START WITH 1 TABLET DAILY FOR 1 WEEKS, THEN INCREASE TO 1 TABLET TWCIE DAILY     Date Last Fill: 4/3/19  Pharmacy: [facility]   Submit electronically to pharmacy  Controlled Substance Agreement on File:   Encounter-Level CSA Scan Date:    There are no encounter-level csa scan date.       Last office visit: Last office visit pertaining to requested medication was 4/9/19.    Rx renewed per Medication Renewal policy  Lisinopril  Lab Results   Component Value Date    CREATININE 1.00 04/03/2019    BUN 15 04/03/2019     04/03/2019    K 4.0 04/03/2019     04/03/2019    CO2 23 04/03/2019     Meghna Evans RN [facility] Triage/Medication Refill

## 2021-05-29 NOTE — TELEPHONE ENCOUNTER
Refill Approved    Rx renewed per Medication Renewal Policy. Medication was last renewed on 2/28/19.    Meghna Evans, [facility] Triage/Med Refill 6/13/2019     Requested Prescriptions   Pending Prescriptions Disp Refills     simvastatin (ZOCOR) 20 MG tablet [Pharmacy Med Name: SIMVASTATIN 20MG  TAB] 90 tablet 0     Sig: TAKE 1 TABLET BY MOUTH ONCE DAILY IN THE EVENING       Statins Refill Protocol (Hmg CoA Reductase Inhibitors) Passed - 6/12/2019  7:25 PM        Passed - PCP or prescribing provider visit in past 12 months      Last office visit with prescriber/PCP: 4/9/2019 Jimy Grace MD OR same dept: 4/9/2019 Jimy Grace MD OR same specialty: 4/9/2019 Jimy Grace MD  Last physical: 4/3/2019 Last MTM visit: Visit date not found   Next visit within 3 mo: Visit date not found  Next physical within 3 mo: Visit date not found  Prescriber OR PCP: Jimy Oquendo MD  Last diagnosis associated with med order: 1. Dyslipidemia  - simvastatin (ZOCOR) 20 MG tablet [Pharmacy Med Name: SIMVASTATIN 20MG  TAB]; TAKE 1 TABLET BY MOUTH ONCE DAILY IN THE EVENING  Dispense: 90 tablet; Refill: 0    If protocol passes may refill for 12 months if within 3 months of last provider visit (or a total of 15 months).

## 2021-05-29 NOTE — TELEPHONE ENCOUNTER
Who is calling:  The patient   Reason for Call:  The patient is calling to check the status of the medication request. The patient is aware that the script is pending review. The patient would like to have the script expedited because he is out of the medication.   Date of last appointment with primary care: 4/3/2019  Okay to leave a detailed message: Yes

## 2021-05-29 NOTE — TELEPHONE ENCOUNTER
Medication Request  Medication name: Belviq 10 mg Tablet  Pharmacy Name and Location: [facility] [address]  [place], [place]    Reason for request: As requested by [id number] - [place], [place] via fax  When did you use medication last?:  Unknown  Patient offered appointment:  No  Okay to leave a detailed message: no

## 2021-05-29 NOTE — TELEPHONE ENCOUNTER
RN cannot approve Refill Request    RN can NOT refill this medication Protocol failed and NO refill given. Last office visit: 4/9/2019 Jimy Grace MD Last Physical: 4/3/2019 Last MTM visit: Visit date not found Last visit same specialty: 4/9/2019 Jimy Grace MD.  Next visit within 3 mo: Visit date not found  Next physical within 3 mo: Visit date not found      Jazlyn Osorio [facility] Triage/Med Refill 6/3/2019    Requested Prescriptions   Pending Prescriptions Disp Refills     lorcaserin 10 mg Tab 60 tablet 0     Sig: Take 10 mg by mouth 2 (two) times a day. 1 tab BID       There is no refill protocol information for this order

## 2021-05-30 VITALS — BODY MASS INDEX: 48.4 KG/M2 | WEIGHT: 315 LBS

## 2021-05-30 VITALS — BODY MASS INDEX: 49.87 KG/M2 | WEIGHT: 315 LBS

## 2021-05-30 VITALS — WEIGHT: 315 LBS | BODY MASS INDEX: 46.65 KG/M2 | HEIGHT: 69 IN

## 2021-05-31 VITALS — BODY MASS INDEX: 48.92 KG/M2 | WEIGHT: 315 LBS

## 2021-05-31 VITALS — BODY MASS INDEX: 48.56 KG/M2 | WEIGHT: 315 LBS

## 2021-06-01 VITALS — BODY MASS INDEX: 50.08 KG/M2 | WEIGHT: 315 LBS

## 2021-06-01 VITALS — BODY MASS INDEX: 50.11 KG/M2 | WEIGHT: 315 LBS

## 2021-06-01 VITALS — BODY MASS INDEX: 47.95 KG/M2 | WEIGHT: 315 LBS

## 2021-06-02 ENCOUNTER — RECORDS - HEALTHEAST (OUTPATIENT)
Dept: FAMILY MEDICINE | Facility: CLINIC | Age: 41
End: 2021-06-02

## 2021-06-02 VITALS — BODY MASS INDEX: 46.13 KG/M2 | HEIGHT: 68 IN | WEIGHT: 304.38 LBS

## 2021-06-02 NOTE — TELEPHONE ENCOUNTER
Per the request of patient, the pharmacy has been updated to [facility] in [place].   Valerie Zamora

## 2021-06-03 ENCOUNTER — OFFICE VISIT - HEALTHEAST (OUTPATIENT)
Dept: FAMILY MEDICINE | Facility: CLINIC | Age: 41
End: 2021-06-03

## 2021-06-03 VITALS — BODY MASS INDEX: 46.91 KG/M2 | WEIGHT: 304 LBS

## 2021-06-03 DIAGNOSIS — I10 ESSENTIAL HYPERTENSION: ICD-10-CM

## 2021-06-03 DIAGNOSIS — N62 GYNECOMASTIA: ICD-10-CM

## 2021-06-03 DIAGNOSIS — E66.01 CLASS 3 SEVERE OBESITY DUE TO EXCESS CALORIES WITH SERIOUS COMORBIDITY AND BODY MASS INDEX (BMI) OF 50.0 TO 59.9 IN ADULT (H): ICD-10-CM

## 2021-06-03 DIAGNOSIS — R07.89 ATYPICAL CHEST PAIN: ICD-10-CM

## 2021-06-03 DIAGNOSIS — E78.5 DYSLIPIDEMIA: ICD-10-CM

## 2021-06-03 DIAGNOSIS — Z00.00 ROUTINE GENERAL MEDICAL EXAMINATION AT A HEALTH CARE FACILITY: ICD-10-CM

## 2021-06-03 DIAGNOSIS — E66.813 CLASS 3 SEVERE OBESITY DUE TO EXCESS CALORIES WITH SERIOUS COMORBIDITY AND BODY MASS INDEX (BMI) OF 50.0 TO 59.9 IN ADULT (H): ICD-10-CM

## 2021-06-03 DIAGNOSIS — R06.83 SNORES: ICD-10-CM

## 2021-06-03 DIAGNOSIS — R05.9 COUGH: ICD-10-CM

## 2021-06-03 DIAGNOSIS — J30.2 SEASONAL ALLERGIC RHINITIS, UNSPECIFIED TRIGGER: ICD-10-CM

## 2021-06-03 LAB
ATRIAL RATE - MUSE: 69 BPM
DIASTOLIC BLOOD PRESSURE - MUSE: NORMAL
INTERPRETATION ECG - MUSE: NORMAL
P AXIS - MUSE: 30 DEGREES
PR INTERVAL - MUSE: 142 MS
QRS DURATION - MUSE: 90 MS
QT - MUSE: 382 MS
QTC - MUSE: 409 MS
R AXIS - MUSE: -65 DEGREES
SYSTOLIC BLOOD PRESSURE - MUSE: NORMAL
T AXIS - MUSE: -3 DEGREES
VENTRICULAR RATE- MUSE: 69 BPM

## 2021-06-03 ASSESSMENT — ANXIETY QUESTIONNAIRES
3. WORRYING TOO MUCH ABOUT DIFFERENT THINGS: NOT AT ALL
1. FEELING NERVOUS, ANXIOUS, OR ON EDGE: NOT AT ALL
4. TROUBLE RELAXING: NOT AT ALL
GAD7 TOTAL SCORE: 0
2. NOT BEING ABLE TO STOP OR CONTROL WORRYING: NOT AT ALL
5. BEING SO RESTLESS THAT IT IS HARD TO SIT STILL: NOT AT ALL
IF YOU CHECKED OFF ANY PROBLEMS ON THIS QUESTIONNAIRE, HOW DIFFICULT HAVE THESE PROBLEMS MADE IT FOR YOU TO DO YOUR WORK, TAKE CARE OF THINGS AT HOME, OR GET ALONG WITH OTHER PEOPLE: NOT DIFFICULT AT ALL
6. BECOMING EASILY ANNOYED OR IRRITABLE: NOT AT ALL
7. FEELING AFRAID AS IF SOMETHING AWFUL MIGHT HAPPEN: NOT AT ALL

## 2021-06-03 ASSESSMENT — MIFFLIN-ST. JEOR: SCORE: 2365.24

## 2021-06-03 ASSESSMENT — PATIENT HEALTH QUESTIONNAIRE - PHQ9: SUM OF ALL RESPONSES TO PHQ QUESTIONS 1-9: 0

## 2021-06-04 VITALS
HEART RATE: 90 BPM | SYSTOLIC BLOOD PRESSURE: 119 MMHG | TEMPERATURE: 98.6 F | OXYGEN SATURATION: 99 % | RESPIRATION RATE: 18 BRPM | DIASTOLIC BLOOD PRESSURE: 79 MMHG

## 2021-06-05 NOTE — TELEPHONE ENCOUNTER
Asking for a refill.of Cherritussin cough syrup.  States that Dr. Hewitt has filled this many times for him without him needing to be seen.      He needs a refill because he states he has a cold, and a bad cough.    No fever. He states  he just has a regular cold.    Please advise.  Pharmacy in chart.    Vika Luevano RN  [facility] Triage/refill nurse

## 2021-06-05 NOTE — TELEPHONE ENCOUNTER
FYI - Status Update  Who is Calling: Patient  Update: Patient stated that he was informed by the pharmacy that there are no prescription sent for his cough. Patient stated that he is calling to check the status of this prescription below. Patient stated that his cough is getting annoying and that it hurts when cough.   Okay to leave a detailed message?:  No return call needed

## 2021-06-05 NOTE — TELEPHONE ENCOUNTER
Medication Request  Medication name: Virtussin -10mg  Requested Pharmacy: [facility]  Reason for request: Refill request received, medication is not on active med list.   When did you use medication last?:  Last fill not provided  Patient offered appointment:  N/A - electronic request  Okay to leave a detailed message: yes

## 2021-06-08 NOTE — PROGRESS NOTES
36-year-old gentleman with chronic painful left heel that is worsening over the last couple weeks.  I discussed with the patient about differential diagnosis including plantar fasciitis and heel pain syndrome.  We also spoke briefly about treatment option.  X-ray does show a small bone spur at the plantar aspect of the calcaneus.  I will give him a referral to podiatry for further evaluation and management.  We also spoke about weight reduction and appropriate feet support.  Patient verbalized understanding and agreed with the plan    ASSESSMENT/PLAN:  1. Heel pain  - XR Calcaneus Left 2 or More Views; Future  - Ambulatory referral to Podiatry      Orders Placed This Encounter   Procedures     XR Calcaneus Left 2 or More Views     Standing Status:   Future     Number of Occurrences:   1     Standing Expiration Date:   1/24/2018     Order Specific Question:   Can the procedure be changed per Radiologist protocol?     Answer:   Yes     Ambulatory referral to Podiatry     Referral Priority:   Routine     Referral Type:   Consultation     Referral Reason:   Evaluation and Treatment     Requested Specialty:   Podiatry     Number of Visits Requested:   1           CHIEF COMPLAINT:  Chief Complaint   Patient presents with     Foot Pain     left  heal x months, not sure if it's related to injury to his injury 2 years ago       HISTORY OF PRESENT ILLNESS:  Ilan is a 36 y.o. male presenting to the clinic today for foot pain. 2 years ago, he injured the back of his lower legs. The injury took a while to heal. Over the past 6-8 months, he started to feel the pain in his lower leg again. In October 2016, his heel to the back of his left ankle started to hurt. It is now very painful. No pain at rest. There is pain with bearing weight. He went back to his chiropractor, and he suggested to take ibuprofen. He has been doing this for the past few weeks. There has not been new injury or re-injury to his leg or foot. He has not had  any pain on his right foot, just his left. He thinks that he could have plantar fasciitis. He feels like it is worse in the morning. Last month, he bought some new shoes with heel support, but it did not help his pain.     REVIEW OF SYSTEMS:   Constitutional: Negative.   HENT: Negative.   Eyes: Negative.   Respiratory: Negative.   Cardiovascular: Negative.   Gastrointestinal: Negative.   Endocrine: Negative.   Genitourinary: Negative.   Musculoskeletal: Negative.   Skin: Negative.   Allergic/Immunologic: Negative.   Neurological: Negative.   Hematological: Negative.   Psychiatric/Behavioral: Negative.   All other systems are negative.    PFSH:  No new history.     TOBACCO USE:  History   Smoking Status     Never Smoker   Smokeless Tobacco     Never Used       VITALS:  Vitals:    01/24/17 1600   BP: 128/86   Patient Site: Right Arm   Patient Position: Sitting   Cuff Size: Adult Large   Pulse: 100   Weight: (!) 330 lb 6 oz (149.9 kg)     Wt Readings from Last 3 Encounters:   01/24/17 (!) 330 lb 6 oz (149.9 kg)   05/11/16 (!) 305 lb 1 oz (138.4 kg)   02/24/16 (!) 298 lb 14.4 oz (135.6 kg)       PHYSICAL EXAM:  Constitutional: Patient is oriented to person, place, and time. Patient appears well-developed and well-nourished. No distress.   Cardiovascular: Normal rate.  Pulmonary/Chest: Effort normal. No respiratory distress.   Neurological: Patient is alert and oriented to person, place, and time.  Foot: good dorsal flexion, good plantar flexion. Pulses are strong bilaterally. No tenderness to palpation to left achilles tendon. Cornejo test intact. Tenderness to palpation to left heel pad, and to squeezing of the calcaneous.     Calcaneous XR: small heal spur, otherwise negative foot.     Results for orders placed or performed in visit on 05/11/16   Lipid Cascade   Result Value Ref Range    Cholesterol 200 (H) <=199 mg/dL    Triglycerides 133 <=149 mg/dL    HDL Cholesterol 34 (L) >=40 mg/dL    LDL Calculated 139 (H)  <=129 mg/dL    Patient Fasting > 8hrs? Yes    Glucose   Result Value Ref Range    Glucose 91 70 - 99 mg/dL    Patient Fasting > 8hrs? Yes          ADDITIONAL HISTORY SUMMARIZED (2): None.  DECISION TO OBTAIN EXTRA INFORMATION (1): None.   RADIOLOGY TESTS (1): Ordered foot XR.  LABS (1): None.  MEDICINE TESTS (1): None.  INDEPENDENT REVIEW (2 each): Personally reviewed foot XR.     The visit lasted a total of 14 minutes face to face with the patient. Over 50% of the time was spent counseling and educating the patient about foot pain etiologies.    Pat BROWN, am scribing for and in the presence of, Dr. Hewitt.    Dr. Marcelina BROWN, personally performed the services described in this documentation, as scribed by Pat Willett in my presence, and it is both accurate and complete.    MEDICATIONS:  Current Outpatient Prescriptions   Medication Sig Dispense Refill     cetirizine (ZYRTEC) 10 MG tablet Take 1 tablet (10 mg total) by mouth daily. 30 tablet 2     fluticasone (FLONASE) 50 mcg/actuation nasal spray 1 spray into each nostril daily. 16 g 2     MULTIVITAMIN (MULTIPLE VITAMIN ORAL) Take by mouth.       predniSONE (DELTASONE) 20 MG tablet 60mg x 3 d, 40mg x 3d, 20mg x 3d 18 tablet 0     No current facility-administered medications for this visit.        Total data points: 3

## 2021-06-08 NOTE — PROGRESS NOTES
[facility] Daily Progress     Patient Name: Ilan Matthew  Date: 2019  Visit #: 3  Referral Diagnosis: L plantar fasciitis  Referring provider: Jimy Grace*  Visit Diagnosis:     ICD-10-CM    1. Left foot pain M79.672    2. Limitation of joint motion of ankle, left M25.672    3. Limitation of joint motion of ankle, right M25.671          Assessment:   Patient with improved tone along L posterior tib, flexor digitorum longus and flexor hallicus longus. Patient also continues to note positive improvement following the manual therapy and there ex combo.     He is looking into maintaining his benefit coverage and will call to schedule more appointments this afternoon after he hears from [employer].    HEP/POC compliance is  fair .  Patient is appropriate to continue with skilled physical therapy intervention, as indicated by initial plan of care.    Goal Status:  Pt. will be independent with home exercise program in : 4 weeks  Pt will: Improve LEFS by 10 points in 8 weeks  Pt will: be able to walk 20 minutes with 50% in 10 weeks    Plan / Patient Education:     Continue with initial plan of care.  Progress with home program as tolerated.    PLAN for next visit: continue with the manual therapy,  Subjective:     Pain Ratin  Patient reports to have worked a double shift so he is a little sore today. He does not feel the need to take IBP which is a small improvement for him. He is also no longer in constant pain.  He noted for three days the pain to be improved following the manual therapy.    Objective:     Exercises:  Exercise #1: seated gastroc stretch with belt x 30 seconds  Comment #1: standing gastroc stretch x 30 seconds  Exercise #2: tennis ball roll x 1 minute  Comment #2: Small intrinsics of the foot x 10 B  Exercise #3: B eccentric heel lowering on step x 10B      Treatment Today     TREATMENT MINUTES COMMENTS   Evaluation     Self-care/ Home management     Manual therapy 15 Patient  positioned in supine with proper under knees- TPR to L posterior tibialis, flexor digitorum longus, flexor hallicus longus. MFR to L plantar fascia. Calcaneal distraction on the L.  Patient with good toleration for the manual therapy.   Neuromuscular Re-education     Therapeutic Activity     Therapeutic Exercises 10 Patient educated on bringing ice pack to work and using ice over break.   Gait training     Modality__________________                Total 25    Blank areas are intentional and mean the treatment did not include these items.       Yudi Garcia  2/8/2017

## 2021-06-08 NOTE — PROGRESS NOTES
[facility] Daily Progress     Patient Name: Ilan Matthew  Date: 2017  Visit #: 2  Referral Diagnosis: L plantar fasciitis  Referring provider: Jimy Grace*  Visit Diagnosis:     ICD-10-CM    1. Left foot pain M79.672    2. Limitation of joint motion of ankle, left M25.672    3. Limitation of joint motion of ankle, right M25.671          Assessment:   Patient with increased tone along L posterior tib, flexor digitorum longus and flexor hallicus longus. PT added manual therapy this session to address.    HEP/POC compliance is  fair .  Patient is appropriate to continue with skilled physical therapy intervention, as indicated by initial plan of care.    Goal Status:  Pt. will be independent with home exercise program in : 4 weeks  Pt will: Improve LEFS by 10 points in 8 weeks  Pt will: be able to walk 20 minutes with 50% in 10 weeks    Plan / Patient Education:     Continue with initial plan of care.  Progress with home program as tolerated.    Subjective:     Pain Ratin  Patient reports to have been off work all weekend so his pain today is not as bad.  Patient reports to be tolerating the standing stretch once a day and educated on trying to complete twice per days.      Objective:     Exercises:  Exercise #1: seated gastroc stretch with belt x 30 seconds  Comment #1: standing gastroc stretch x 30 seconds  Exercise #2: tennis ball roll x 1 minute  Comment #2: Small intrinsics of the foot x 10 B      Treatment Today     TREATMENT MINUTES COMMENTS   Evaluation     Self-care/ Home management     Manual therapy 20 Patient positioned t[place]on supine with proper under knees- TPR to L posterior tibialis, flexor digitorum longus, flexor hallicus longus. MFR to L plantar fascia.  Patient with gold toleration for the manual therapy.   Neuromuscular Re-education     Therapeutic Activity     Therapeutic Exercises 8 Patient educated on bringing ice pack to work and using ice over break.   Gait  training     Modality__________________                Total 28    Blank areas are intentional and mean the treatment did not include these items.       Yudi Garcia  1/30/2017

## 2021-06-08 NOTE — PROGRESS NOTES
[facility]   Foot/Ankle Initial Evaluation    Patient Name: Ilan Matthew  Date of evaluation: 1/27/2017  Referral Diagnosis: Plantar fasciitis  Referring provider: Elizabeth Gastelum, *  Visit Diagnosis:     ICD-10-CM    1. Left foot pain M79.672    2. Limitation of joint motion of ankle, left M25.672    3. Limitation of joint motion of ankle, right M25.671        Assessment:    Patient presents with L medial ankle pain secondary moderate to severe lack of ankle ROM and increased weight gain. PT will begin manual therapy and exercise combo.  Patient seen for 30 minute eval and will continue with further examination as time permits.  Pt. is appropriate for skilled PT intervention as outlined in the Plan of Care (POC).  Pt. is a good candidate for skilled PT services to improve pain levels and function.    Goals:  Pt. will be independent with home exercise program in : 4 weeks  Pt will: Improve LEFS by 10 points in 8 weeks  Pt will: be able to walk 20 minutes with 50% in 10 weeks    Barriers to Learning or Achieving Goals:  Chronicity of the problem.    Patient's expectations/goals are realistic.       Plan / Patient Instructions:        Plan of Care:   Communication with: Referral Source  Patient Related Instruction: Nature of Condition;Treatment plan and rationale;Self Care instruction;Basis of treatment;Posture  Times per Week: 2  Number of Weeks: 12  Number of Visits: 12  Therapeutic Exercise: Stretching;Strengthening;ROM  Neuromuscular Reeducation: posture;balance/proprioception  Manual Therapy: myofascial release;soft tissue mobilization;joint mobilization  Modalities: electrical stimulation;TENS    Plan for next visit: assess response to home exercises, consider Manual therapy, remember that the chiro is doing STM, we will do joint mobilization     Subjective:       History of Present Illness:    Ilan is a 36 y.o. male who presents to therapy today with complaints of L heel pain that started  on 10/2016. He was hoping that this would go away on his own. He tried buying new shoes which did not change his symptoms. Positive imaging of a bone spur but the orthopedic this morning thinks due to the lack of gastroc mobility and weight. He is nervous about starting his new job at [employer] and worried about being on his feet. Patient would like to avoid an injection but also does not want to be in so much pain with the walking required for his job.    Better with: sitting  Worse with: walking (5 minutes, 6/10 pain)    Pain Ratin-3  Pain rating at best: 0 if he has been sitting for an hour  Pain rating at worst: 9 increased   Pain description: burning    Functional limitations are described as occurring with:   standing 1  minute  walking 5 minute    Patient reports benefit from:  rest       Objective:      Note: Items left blank indicates the item was not performed or not indicated at the time of the evaluation.    Patient Outcome Measures :      Lower Extremity Functional Scale (_/80): 36   Scores range from 0-80, where a score of 80 represents maximum function. The minimal clinically important difference is a positive change of 9 points.    Ankle/Foot Examination  1. Left foot pain     2. Limitation of joint motion of ankle, left     3. Limitation of joint motion of ankle, right       Involved Side: Left  Posture Observation:      General sitting posture is  fair.  General standing posture is poor.  Assistive Device: None  Gait Observation: decreased calf mobility and DF  Hip Clearing: Does not provoke symptoms  Knee Clearing: Symptoms provoked with bending forward at hip but may be due more to lack of ankle mobility.    Foot/Ankle ROM:    Date: 17     Ankle ROM( ) AROM in degrees AROM in degrees AROM in degrees    Right Left Right Left Right Left   Dorsiflexion, gastroc (10 ) 0 Lack 1       Dorsiflexion, soleus (20 ) 0 1       Plantar Flexion (50 ) 45 35       Inversion (45-60 ) 40 35       Eversion  (15-30 ) 25 27       Great Toe Extension (70 )         Great Toe Flexion (MTP45 , IP90 )           Foot/Ankle Special Tests:     Ligament Tests Right (+/-) Left (+/-) Fracture Tests Right (+/-) Left (+/-)   Anterior Drawer Test   [place] Ankle Rule     [place] Tilt Test   [place] Foot Rule       Impingement Tests   Heel Tap ( Bump ) Test       Impingement sign   Squeeze Test     Impingement Sign cluster  1.ant-lat ankle tenderness  2.ant-lat ankle swelling  3.pain with forced DF and eversion  4.pain with SL squat  5.pain with activities  6.ankle instability  (?5 = +)   [place] Test     Achilles Tests Right (+/-) Left (+/-) Plantar Fasciitis Tests Right (+/-) Left (+/-)   [place] s Calf Squeeze test    [place] (non-WB ing) for plantar fasciitis     Arc Sign    Windlass (WB ing) for plantar fasciitis     [facility] Test    Other     Other   Other     Other   Other            Flexibility: poor gastroc flexibility  palpation: L medial heel tenderness                                                                          Ankle strength measure: 4+ L ankle Inversion, the remaining 5/5      Treatment Today       TREATMENT MINUTES COMMENTS   Evaluation 15 Patient seen for a 30 minute eval today to expedite treatment process.   Low.   Self-care/ Home management     Manual therapy     Neuromuscular Re-education     Therapeutic Activity     Therapeutic Exercises 20 PT spent a fair amount of time in education regarding the patients' questions about injection. Patient educated on the mechanics of his loss of ankle mobility and the effect that can have on ankle mechanics and pain. Patient also educated that the injection can provide some short term helo with pain but it is a band-aid for the problem.   Gait training     Modality__________________                Total 35    Blank areas are intentional and mean the treatment did not include these items.     PT Evaluation Code: (Please list factors)  Patient  History/Comorbidities: obesity  Examination: ankle, gait  Clinical Presentation: stable  Clinical Decision Making: low    Patient History/  Comorbidities Examination  (body structures and functions, activity limitations, and/or participation restrictions) Clinical Presentation Clinical Decision Making (Complexity)   No documented Comorbidities or personal factors 1-2 Elements Stable and/or uncomplicated Low   1-2 documented comorbidities or personal factor 3 Elements Evolving clinical presentation with changing characteristics Moderate   3-4 documented comorbidities or personal factors 4 or more Unstable and unpredictable High                Yudi Garcia  1/27/2017  12:04 PM

## 2021-06-09 NOTE — PROGRESS NOTES
Admission History & Physical  Ialn Matthew, 1980, [id number]    [facility]  Jimy Javier  Marcelina Oquendo MD, [phone]    Extended Emergency Contact Information  Primary Emergency Contact: Roberta Freire  Address: [facility] [address] [facility]            [place] [id number] [place]  Mobile Phone: [phone]  Relation: Spouse     Assessment and Plan:   Assessment: Plantar fasciitis left foot, tinea pedis  Plan: The patient was given a cortisone injection in the left heel today.  He was also given a prescription for Lotrisone cream.  Active Problems:    * No active hospital problems. *      Chief Complaint:  severe heel pain left foot times several months.  Dry skin itching between toes both feet      HPI:    Ilan Matthew is a 37 y.o. old male who presented to the clinic today complaining of severe heel pain involving his left foot.  He has had this heel pain for several months.  He did have one cortisone injection in the past which gave him some moderate relief.  The pain did recur.  The pain is aggravated with weightbearing and ambulation.  The pain is most severe when he first gets out of bed in the mornings.  He denies any particular trauma to the left foot.  The patient also has taken ibuprofen.  He also complained of severe itching between the toes of both feet.  History is provided by patient    Medical History  Active Ambulatory (Non-Hospital) Problems    Diagnosis     Obesity     Hypertrophy Of Breast     History reviewed. No pertinent past medical history.  Patient Active Problem List    Diagnosis Date Noted     Obesity      Hypertrophy Of Breast      Surgical History  He  has no past surgical history on file.   History reviewed. No pertinent surgical history. Social History  Reviewed, and he  reports that he has never smoked. He has never used smokeless tobacco.  Social History   Substance Use Topics     Smoking status: Never Smoker     Smokeless  tobacco: Never Used     Alcohol use Not on file      Allergies  Allergies   Allergen Reactions     Fish Oil [Omega-3 Fatty Acids] Other (See Comments)     Swollen tongue    Family History  Reviewed, and family history includes Hypertension in his mother.   Psychosocial Needs  Social History     Social History Narrative     Additional psychosocial needs reviewed per nursing assessment.       Prior to Admission Medications     (Not in a hospital admission)        Review of Systems - Negative     Pulse (!) 105  Wt (!) 323 lb (146.5 kg)  SpO2 96%  BMI 48.4 kg/m2    Objective findings: Gen: The patient is alert and in no acute distress:      Integument: Nails bilateral feet are normal length and color.Skin bilaterally warm and intact.  There is some dry scaling skin between the toes of both feet.      Vascular: DP and PT pulses +2/4 bilateral feet. Capillary refill less than 2 seconds bilateral feet.      Neurologic: Negative clonus, negative Babinski bilaterally.      Musculoskeletal: Range of motion within normal limits bilateral feet. Muscle power is 5 over 5 bilaterally in all compartments.  There is severe pain on palpation of the plantar medial aspect of the left t heel. There is no edema or erythema surrounding the right heel. No palpable masses noted right heel.      Assessment: Plantar fasciitis left t foot, tinea pedis     Plan: The patient was given a cortisone injection in the left heel today consisting of 2 cc of dexamethasone sodium phosphate and 1 cc of 2% lidocaine plain.  The patient was placed on Lotrisone cream to be applied daily.  I informed the patient that if his heel pain does not improve I would recommend a plantar fasciotomy.  An x-ray would also be recommended during his next visit if his heel pain does not improve.

## 2021-06-09 NOTE — PROGRESS NOTES
37-year-old gentleman with influenza.  Will give him Tamiflu.  He was given Tylenol here in the clinic.  Continue with supportive cares, fluid hydration, rest.  I took him off of work for a few days.  Follow-up in 1 week if not better.  Patient verbalized understanding and agreed with the plan      ASSESSMENT/PLAN:  1. Sorethroat  - Rapid Strep A Screen-Throat  - Group A Strep, RNA Direct Detection, Throat    2. Fever  - Influenza A/B Rapid Test  - acetaminophen tablet 975 mg (TYLENOL); Take 3 tablets (975 mg total) by mouth every 4 (four) hours as needed for mild pain (1-3).    3. Influenza  - oseltamivir (TAMIFLU) 75 MG capsule; Take 1 capsule (75 mg total) by mouth 2 (two) times a day for 5 days.  Dispense: 10 capsule; Refill: 0      Orders Placed This Encounter   Procedures     Rapid Strep A Screen-Throat     Influenza A/B Rapid Test     Group A Strep, RNA Direct Detection, Throat     Administrations This Visit     acetaminophen tablet 975 mg (TYLENOL)     Admin Date Action Dose Route Administered By             03/14/2017 Given 975 mg Oral Sammie Martinez CMA                            CHIEF COMPLAINT:  Chief Complaint   Patient presents with     Cough     x 2 days, dry cough     Sore Throat     x 2 days       HISTORY OF PRESENT ILLNESS:  Ilan is a 37 y.o. male presenting to the clinic today for a cough. This started yesterday, and it started as a sore throat and dry cough. He just had fevers today. He has chills and body aches. His family is out of town, so he has not been around his children. His temperature is 101.8 today, and his pulse is elevated at 105 beats per minute. His rapid influenza is positive for influenza A. He has not taken any tylenol today. The cough syrup has not helped him much. His nasal spray has been helping with his congestion.     REVIEW OF SYSTEMS:   All other systems are negative.    PFSH:  No new history.     TOBACCO USE:  History   Smoking Status     Never Smoker   Smokeless  Tobacco     Never Used       VITALS:  Vitals:    03/14/17 1346 03/14/17 1423   BP: 164/88 148/82   Patient Site: Left Arm    Patient Position: Sitting    Cuff Size: Adult Large    Pulse: (!) 105    Temp: (!) 101.8  F (38.8  C)    TempSrc: Oral    SpO2: 98%    Weight: (!) 323 lb (146.5 kg)      Wt Readings from Last 3 Encounters:   03/14/17 (!) 323 lb (146.5 kg)   03/01/17 (!) 323 lb (146.5 kg)   02/27/17 (!) 323 lb 9 oz (146.8 kg)       PHYSICAL EXAM:  Constitutional: Patient is oriented to person, place, and time. Patient appears well-developed and well-nourished.    Head: Normocephalic and atraumatic.   Right Ear: External ear normal.   Left Ear: External ear normal.   Nose: Nose normal.   Mouth/Throat: Oropharynx is clear and moist. No oropharyngeal exudate.   Eyes: Conjunctivae and EOM are normal. Pupils are equal, round, and reactive to light. Right eye exhibits no discharge. Left eye exhibits no discharge. No scleral icterus.   Cardiovascular: Normal rate, regular rhythm, normal heart sounds and intact distal pulses. No murmur heard.   Pulmonary/Chest: Effort normal and breath sounds normal. No stridor. No respiratory distress. Patient has no wheezes, no rales, exhibits no tenderness.     Results for orders placed or performed in visit on 03/14/17   Rapid Strep A Screen-Throat   Result Value Ref Range    Rapid Strep A Antigen No Group A Strep detected No Group A Strep detected   Influenza A/B Rapid Test   Result Value Ref Range    Influenza  A, Rapid Antigen Influenza A antigen detected (!) No Influenza A antigen detected    Influenza B, Rapid Antigen No Influenza B antigen detected No Influenza B antigen detected         ADDITIONAL HISTORY SUMMARIZED (2): None.  DECISION TO OBTAIN EXTRA INFORMATION (1): None.   RADIOLOGY TESTS (1): None.  LABS (1): Ordered and reviewed labs today.  MEDICINE TESTS (1): None.  INDEPENDENT REVIEW (2 each): None.     The visit lasted a total of 6 minutes face to face with the  patient. Over 50% of the time was spent counseling and educating the patient about influenza care.    I, Pat Willett, am scribing for and in the presence of, Dr. Hewitt.    I, Dr. Hewitt, personally performed the services described in this documentation, as scribed by Pat Willett in my presence, and it is both accurate and complete.    MEDICATIONS:  Current Outpatient Prescriptions   Medication Sig Dispense Refill     codeine-guaiFENesin (GUAIFENESIN AC)  mg/5 mL liquid Take 5 mL by mouth 3 (three) times a day as needed for cough. 120 mL 0     fluticasone (FLONASE) 50 mcg/actuation nasal spray 1 spray into each nostril daily. 16 g 2     naltrexone-bupropion 8-90 mg TbER Take 1 tablet by mouth 2 (two) times a day. WK1 one daily, WK2 one BID, WK3 two in am and one in pm, WK4 two  tablet 0     oseltamivir (TAMIFLU) 75 MG capsule Take 1 capsule (75 mg total) by mouth 2 (two) times a day for 5 days. 10 capsule 0     Current Facility-Administered Medications   Medication Dose Route Frequency Provider Last Rate Last Dose     acetaminophen tablet 975 mg (TYLENOL)  975 mg Oral Q4H PRN Jimy Oquendo MD   975 mg at 03/14/17 1432     triamcinolone acetonide 40 mg/mL injection 40 mg (KENALOG-40)  40 mg Intramuscular Once Jimy Oquendo MD           Total data points: 1

## 2021-06-09 NOTE — PROGRESS NOTES
ASSESSMENT/PLAN:  1. Routine general medical examination at a health care facility  We discussed healthy lifestyle, nutrition, cardiovascular risk reduction, self care, safety, sunscreen, and seatbelt screening.  Health maintenance screening and immunizations reviewed with the patient.    2. Obesity  Counseled.  Will start contrave.  Goals are to limit to one serving per meal and improve on sleep quality.  F/u in 1month  - naltrexone-bupropion 8-90 mg TbER; Take 1 tablet by mouth 2 (two) times a day. WK1 one daily, WK2 one BID, WK3 two in am and one in pm, WK4 two BID  Dispense: 120 tablet; Refill: 0  - Thyroid Stimulating Hormone (TSH)    3. Plantar fasciitis  Will come back later in the week for steroid injection    4. Elevated blood pressure  Connseled lifestyle modification and sodium restriction    5. Multiple allergies, nasal congestion  refilled  - fluticasone (FLONASE) 50 mcg/actuation nasal spray; 1 spray into each nostril daily.  Dispense: 16 g; Refill: 2    SUBJECTIVE:  Ilan Matthew is a 37 y.o. male who is here for a health maintenance visit. His pulse is within normal limits. His BMI is 48. He is fasting today.     Elevated Blood Pressure: His blood pressure is elevated at 156/106. He does not think he is nervous. He is going to see a cardiologist, and they are requesting blood work. Family history of hypertension in mother.     Plantar Fasciitis: He has been seeing physical therapy for his left foot pain, and is having no relief. He has been taking ibuprofen every day for his foot pain. He has started a new job that keeps him on his feet. He did see podiatrist at [facility]. He says that the podiatrist just recommend physical therapy, and nothing further. He has also been seeing his chiropractor, and feels relief after these visits. He feels like his increase in weight is making his foot pain worse. His pain is located around his ankle, and the middle of his left heel. He feels like new  "orthotics would help him. He has also purchased new shoes to see if it would help with his pain.     Weight Management: He feels like he could exercise more, but he is constantly on the run at work. He thinks that he eats well, but he could work on portion sizes. He cooks at home, and hardly eats out. He drinks plenty of water, and eats proteins and vegetables. He has tried phentermine, but instantly felt chest palpitations when he took it. He did not try the Contrave.     REVIEW OF SYSTEMS:   He has been doing Flonase for allergies.    All other systems are negative.    PFSH:  No new history.     History   Smoking Status     Never Smoker   Smokeless Tobacco     Never Used       Family History   Problem Relation Age of Onset     Hypertension Mother        No past surgical history on file.    Allergies   Allergen Reactions     Fish Oil [Omega-3 Fatty Acids] Other (See Comments)     Swollen tongue         OBJECTIVE:   Physical Exam   Vitals:    02/27/17 1058 02/27/17 1129   BP: (!) 156/106 (!) 130/94   Patient Site: Left Arm Left Arm   Patient Position: Sitting Sitting   Cuff Size: Adult Large Adult Large   Pulse: 72    Weight: (!) 323 lb 9 oz (146.8 kg)    Height: 5' 8.5\" (1.74 m)      Estimated body mass index is 48.48 kg/(m^2) as calculated from the following:    Height as of this encounter: 5' 8.5\" (1.74 m).    Weight as of this encounter: 323 lb 9 oz (146.8 kg).    Constitutional: Patient is oriented to person, place, and time. Patient appears well-developed and well-nourished. No distress.   Head: Normocephalic and atraumatic.   Right Ear: External ear normal.   Left Ear: External ear normal.   Nose: Nose normal.   Mouth/Throat: Oropharynx is clear and moist. No oropharyngeal exudate.   Eyes: Conjunctivae and EOM are normal. Pupils are equal, round, and reactive to light. Right eye exhibits no discharge. Left eye exhibits no discharge. No scleral icterus.   Neck: Neck supple. No JVD present. No tracheal " deviation present. No thyromegaly present.   Cardiovascular: Normal rate, regular rhythm, normal heart sounds and intact distal pulses.   No murmur heard.   Pulmonary/Chest: Effort normal and breath sounds normal. No stridor. No respiratory distress. Patient has no wheezes, no rales, exhibits no tenderness.   Abdominal: Soft. Bowel sounds are normal. Patient exhibits no distension and no mass.  There is no tenderness. There is no rebound and no guarding.  No inguinal hernia on valsalva maneuver  Lymphadenopathy:  Patient has no cervical adenopathy.   Neurological: Patient is alert and oriented to person, place, and time. Patient has normal reflexes. No cranial nerve deficit. Coordination normal.   Skin: Skin is warm and dry. No rash noted. Patient is not diaphoretic. No erythema. No pallor.   Psychiatric: Patient has good eye contact without any psychomotor retardation or stereotypic behaviors.  normal mood and affect. Judgment and thought content normal.   Speech is regular rate and rhythm.    Left foot: Tenderness to palpation of the plantar aspect and medial and lateral aspects of his left heel    QUALITY MEASURES:  The following high BMI interventions were performed this visit: encouragement to exercise and lifestyle education regarding diet    ADDITIONAL HISTORY SUMMARIZED (2): Reviewed note from 1/27/2017 regarding podiatry visit with [facility].  DECISION TO OBTAIN EXTRA INFORMATION (1): None.   RADIOLOGY TESTS (1): None.  LABS (1): Ordered labs today.  MEDICINE TESTS (1): None.  INDEPENDENT REVIEW (2 each): None.     The visit lasted a total of 20 minutes face to face with the patient. Over 50% of the time was spent counseling and educating the patient about health maintenance.    IPat, am scribing for and in the presence of, Dr. Hewitt.    IDr. Hewitt, personally performed the services described in this documentation, as scribed by Pat Willett in my presence, and it  is both accurate and complete.      MEDICATIONS:  Current Outpatient Prescriptions   Medication Sig Dispense Refill     fluticasone (FLONASE) 50 mcg/actuation nasal spray 1 spray into each nostril daily. 16 g 2     naltrexone-bupropion 8-90 mg TbER Take 1 tablet by mouth 2 (two) times a day. WK1 one daily, WK2 one BID, WK3 two in am and one in pm, WK4 two  tablet 0     No current facility-administered medications for this visit.          Total data points: 3

## 2021-06-09 NOTE — TELEPHONE ENCOUNTER
RN cannot approve Refill Request    RN can NOT refill this medication Protocol failed and NO refill given.    Meghna Evans, [facility] Triage/Med Refill 7/20/2020    Requested Prescriptions   Pending Prescriptions Disp Refills     lisinopriL (PRINIVIL,ZESTRIL) 20 MG tablet [Pharmacy Med Name: Lisinopril 20 MG Oral Tablet] 30 tablet 0     Sig: Take 1 tablet by mouth once daily       Ace Inhibitors Refill Protocol Failed - 7/20/2020  9:33 AM        Failed - PCP or prescribing provider visit in past 12 months       Last office visit with prescriber/PCP: 4/9/2019 Jimy Grace MD OR same dept: Visit date not found OR same specialty: 4/9/2019 Jimy Grace MD  Last physical: 4/3/2019 Last MTM visit: Visit date not found   Next visit within 3 mo: Visit date not found  Next physical within 3 mo: Visit date not found  Prescriber OR PCP: Jimy Oquendo MD  Last diagnosis associated with med order: 1. HTN (hypertension)  - lisinopriL (PRINIVIL,ZESTRIL) 20 MG tablet [Pharmacy Med Name: Lisinopril 20 MG Oral Tablet]; TAKE 1 TABLET BY MOUTH ONCE DAILY  Dispense: 30 tablet; Refill: 0  - hydroCHLOROthiazide (MICROZIDE) 12.5 mg capsule [Pharmacy Med Name: hydroCHLOROthiazide 12.5 MG Oral Capsule]; Take 1 capsule by mouth once daily  Dispense: 30 capsule; Refill: 0    2. Dyslipidemia  - simvastatin (ZOCOR) 20 MG tablet [Pharmacy Med Name: Simvastatin 20 MG Oral Tablet]; TAKE 1 TABLET BY MOUTH ONCE DAILY IN THE EVENING  Dispense: 30 tablet; Refill: 0    If protocol passes may refill for 12 months if within 3 months of last provider visit (or a total of 15 months).             Failed - Serum Potassium in past 12 months     No results found for: LN-POTASSIUM          Failed - Blood pressure filed in past 12 months     BP Readings from Last 1 Encounters:   04/09/19 122/70             Failed - Serum Creatinine in past 12 months     Creatinine   Date Value Ref Range Status   04/03/2019  1.00 0.70 - 1.30 mg/dL Final                hydroCHLOROthiazide (MICROZIDE) 12.5 mg capsule [Pharmacy Med Name: hydroCHLOROthiazide 12.5 MG Oral Capsule] 30 capsule 0     Sig: Take 1 capsule by mouth once daily       Diuretics/Combination Diuretics Refill Protocol  Failed - 7/20/2020  9:33 AM        Failed - Visit with PCP or prescribing provider visit in past 12 months     Last office visit with prescriber/PCP: 4/9/2019 Jimy Grace MD OR same dept: Visit date not found OR same specialty: 4/9/2019 Jimy Grace MD  Last physical: 4/3/2019 Last MTM visit: Visit date not found   Next visit within 3 mo: Visit date not found  Next physical within 3 mo: Visit date not found  Prescriber OR PCP: Jimy Oquendo MD  Last diagnosis associated with med order: 1. HTN (hypertension)  - lisinopriL (PRINIVIL,ZESTRIL) 20 MG tablet [Pharmacy Med Name: Lisinopril 20 MG Oral Tablet]; TAKE 1 TABLET BY MOUTH ONCE DAILY  Dispense: 30 tablet; Refill: 0  - hydroCHLOROthiazide (MICROZIDE) 12.5 mg capsule [Pharmacy Med Name: hydroCHLOROthiazide 12.5 MG Oral Capsule]; Take 1 capsule by mouth once daily  Dispense: 30 capsule; Refill: 0    2. Dyslipidemia  - simvastatin (ZOCOR) 20 MG tablet [Pharmacy Med Name: Simvastatin 20 MG Oral Tablet]; TAKE 1 TABLET BY MOUTH ONCE DAILY IN THE EVENING  Dispense: 30 tablet; Refill: 0    If protocol passes may refill for 12 months if within 3 months of last provider visit (or a total of 15 months).             Failed - Serum Potassium in past 12 months      No results found for: LN-POTASSIUM          Failed - Serum Sodium in past 12 months      No results found for: LN-SODIUM          Failed - Blood pressure on file in past 12 months     BP Readings from Last 1 Encounters:   04/09/19 122/70             Failed - Serum Creatinine in past 12 months      Creatinine   Date Value Ref Range Status   04/03/2019 1.00 0.70 - 1.30 mg/dL Final                simvastatin  (ZOCOR) 20 MG tablet [Pharmacy Med Name: Simvastatin 20 MG Oral Tablet] 30 tablet 0     Sig: TAKE 1 TABLET BY MOUTH ONCE DAILY IN THE EVENING       Statins Refill Protocol (Hmg CoA Reductase Inhibitors) Failed - 7/20/2020  9:33 AM        Failed - PCP or prescribing provider visit in past 12 months      Last office visit with prescriber/PCP: 4/9/2019 Jimy Grace MD OR same dept: Visit date not found OR same specialty: 4/9/2019 Jimy Grace MD  Last physical: 4/3/2019 Last MTM visit: Visit date not found   Next visit within 3 mo: Visit date not found  Next physical within 3 mo: Visit date not found  Prescriber OR PCP: Jimy Oquendo MD  Last diagnosis associated with med order: 1. HTN (hypertension)  - lisinopriL (PRINIVIL,ZESTRIL) 20 MG tablet [Pharmacy Med Name: Lisinopril 20 MG Oral Tablet]; TAKE 1 TABLET BY MOUTH ONCE DAILY  Dispense: 30 tablet; Refill: 0  - hydroCHLOROthiazide (MICROZIDE) 12.5 mg capsule [Pharmacy Med Name: hydroCHLOROthiazide 12.5 MG Oral Capsule]; Take 1 capsule by mouth once daily  Dispense: 30 capsule; Refill: 0    2. Dyslipidemia  - simvastatin (ZOCOR) 20 MG tablet [Pharmacy Med Name: Simvastatin 20 MG Oral Tablet]; TAKE 1 TABLET BY MOUTH ONCE DAILY IN THE EVENING  Dispense: 30 tablet; Refill: 0    If protocol passes may refill for 12 months if within 3 months of last provider visit (or a total of 15 months).

## 2021-06-09 NOTE — PROGRESS NOTES
Joint Aspiration/Injection  Date/Time: 3/1/2017 2:16 PM  Performed by: KIMBERLEE PLATA  Authorized by: KIMBERLEE PLATA   Indications: pain   Location: LEFT foot.  Sedation:  Patient sedated: no    Approach: medial  Triamcinolone amount: 40 mg  Bupivacaine 0.25% amount: 1 mL  Patient tolerance: Patient tolerated the procedure well with no immediate complications  Comments: Medial approach injection into plantar fascia of left foot.         37 y.o male with left foot plantar fasciitis is here for steroid injection to the plantar fascia.  1cc of marcaine 0.25% 2.5mg/ml, 1cc 1% lidocaine 10mg/ml, 1cc of Kenalog 40mg/ml were injected into the plantar fascia of the left foot using the medial approach without complications.  He tolerated the procedure.  He felt immediate relief following injection.  Referral to podiatry.

## 2021-06-11 NOTE — PROGRESS NOTES
Chief Complaint   Patient presents with     Ear Pain     Right ear pain        HPI:  Ilan Matthew is a 40 y.o. male who presents today complaining of right ear pain x 24 hours.  Patient does admit to history of cerumen impaction and the use of cotton-tipped swabs.  Last week he had some itching in his ear after swimming, so he used cotton tip swab but the pain did not start until 24 hours ago.  He denies any sick symptoms such as fever, chills, stuffy nose, sore throat, or ear discharge.    History obtained from the patient.    Problem List:  2019-04: Essential hypertension  2019-04: Morbid obesity (H)  Obesity  Hypertrophy Of Breast      No past medical history on file.    Social History     Tobacco Use     Smoking status: Never Smoker     Smokeless tobacco: Never Used   Substance Use Topics     Alcohol use: Not on file       Review of Systems   Constitutional: Negative for fever.   HENT: Positive for ear pain (Right). Negative for congestion, ear discharge and sore throat.    Respiratory: Negative for cough.        Vitals:    08/29/20 1034   BP: 119/79   Patient Site: Left Arm   Patient Position: Sitting   Cuff Size: Adult Large   Pulse: 90   Resp: 18   Temp: 98.6  F (37  C)   TempSrc: Oral   SpO2: 99%       Physical Exam  Constitutional:       General: He is not in acute distress.     Appearance: He is well-developed. He is not diaphoretic.   HENT:      Head: Normocephalic and atraumatic.      Right Ear: External ear normal. No decreased hearing noted. Swelling and tenderness (When pulling on the external pinna) present. No drainage. There is impacted cerumen. No mastoid tenderness. Tympanic membrane is injected, erythematous and bulging.      Left Ear: Tympanic membrane, ear canal and external ear normal.   Eyes:      General:         Right eye: No discharge.         Left eye: No discharge.      Conjunctiva/sclera: Conjunctivae normal.   Pulmonary:      Effort: Pulmonary effort is normal. No respiratory  distress.   Psychiatric:         Behavior: Behavior normal.         Thought Content: Thought content normal.         Judgment: Judgment normal.         Clinical Decision Making:  Cerumen impaction was successfully removed using docusate sodium and irrigation.  Patient tolerated the procedure.  After irrigation tympanic membrane was able to be visualized, but anatomy appeared abnormal.  There was also canal irritation and erythema along the tympanic membrane.  Recommend treatment that would cover for both external and middle ear infection.  Patient was started on Ciprodex and amoxicillin.  He was instructed to keep his ear dry while he is on treatment.  He was also given recommendations for the avoidance of the use of cotton-tipped swabs and swimmer's ear prevention in the future.  At the end of the encounter, I discussed results, diagnosis, medications. Discussed red flags for immediate return to clinic/ER, as well as indications for follow up if no improvement. Patient understood and agreed to plan. Patient was stable for discharge.    1. Impacted cerumen of right ear  docusate sodium 50 mg/5 mL oral liquid 100 mg (COLACE)   2. Non-recurrent acute serous otitis media of right ear  amoxicillin (AMOXIL) 500 MG capsule   3. Infective otitis externa, right  ciprofloxacin-dexamethasone (CIPRODEX) otic suspension         Patient Instructions   1.  Keep ears completely dry during the time that you are on treatment. No swimming. Showering is ok, but keep ears away from the water as much as possible.   2.  Follow-up if no improvement in pain over the course the next 3 to 4 days.  Follow-up sooner if worsening symptoms such as fever develop.  3.  May Take Tylenol or ibuprofen as needed for pain control.  4.  A mixture of 1 part white vinegar to 1 part rubbing alcohol may help promote drying and prevent the growth of bacteria and fungi that can cause swimmer's ear. Pour 1 teaspoon (about 5 milliliters) of the solution into  each ear and let it drain back out. Do this shortly after swimming.   5. The use of cotton swabs is not recommended. They tend to push wax further into the ear. Going forward using over the counter ear washes such as debrox solution are safer to use.

## 2021-06-11 NOTE — PROGRESS NOTES
[facility] Discharge Summary  Patient Name: Ilan Matthew  Date: 7/5/2017  Referral Diagnosis: Plantar fasciitis  Referring provider: Jimy Grace  Visit Diagnosis:   1. Left foot pain     2. Limitation of joint motion of ankle, left     3. Limitation of joint motion of ankle, right         Goals:  No Data Recorded  No Data Recorded    Patient was seen for 3 visits from 1/27/17 to 2/8/17 with 0 missed appointments.  The patient attended therapy initially, but did not finish the therapy sessions prescribed.  Goals were not fully achieved. Explanation for goals not achieved: patient with job change    Therapy will be discontinued at this time.  The patient will need a new referral to resume.    Thank you for your referral.  Yudi Garcia  7/5/2017  9:52 AM

## 2021-06-11 NOTE — TELEPHONE ENCOUNTER
Pt states about a couple days ago experienced right ear itching.  Pt states he continues with ear itching and ear pain that is getting worse.  Pt intends to go to [facility] now.  Dora Marte RN, MA  [facility]    Triage Nurse Advisor    Reason for Disposition    Earache persists > 1 hour    Additional Information    Negative: Ear pain is main symptom    Negative: Hearing loss (complete or partial) is main symptom    Negative: Earwax is the main concern    Negative: [1] Has nasal allergies AND [2] they are acting up    Protocols used: EAR - CONGESTION-A-AH

## 2021-06-12 NOTE — PROGRESS NOTES
ASSESSMENT/PLAN:  HTN (hypertension)  -     lisinopril (PRINIVIL,ZESTRIL) 20 MG tablet; Take 1 tablet (20 mg total) by mouth daily.  Dispense: 30 tablet; Refill: 0  -     Lipid Cascade  -     Comprehensive Metabolic Panel  37-year-old gentleman obese with new diagnosis of hypertension.  I will give him lisinopril 20 mg.  Discussed side effects.  I will have him come back in 2 weeks for nurse only blood pressure check.  We will had a long discussion regarding sodium restriction and lifestyle modification.  He verbalized understanding and agree with the plan    Headache  May take over-the-counter analgesics.  Monitor for worsening symptoms.  Discuss warning symptoms.  Imaging is not indicated at this time.      Orders Placed This Encounter   Procedures     Lipid Cascade     Order Specific Question:   Fasting is required?     Answer:   Yes     Comprehensive Metabolic Panel           CHIEF COMPLAINT:  Chief Complaint   Patient presents with     Headache     x 1 week. taking Tylenol and Advil helps for awhile     cholesterol check     Pt is fasting       HISTORY OF PRESENT ILLNESS:  Ilan is a 37 y.o. male presenting to the clinic today for headaches. He feels his blood pressures is increasing and causing his headaches. He has felt headaches for the past week. He has been checking the pressures at work, and they have been in the 160s/100s. His blood pressure today is elevated today at 160/98. Last night he needed to take ibuprofen so he could get to sleep. He is also wondering about his cholesterol contributing to his headaches, and is fasting for a cholesterol check. He has been trying to go to the gym more, but is still trying to figure out a daily routine for himself.     REVIEW OF SYSTEMS:   Constitutional: Negative.   HENT: Negative.   Eyes: Negative.   Respiratory: Negative.   Cardiovascular: Negative.   Gastrointestinal: Negative.   Endocrine: Negative.   Genitourinary: Negative.   Musculoskeletal: Negative.    Skin: Negative.   Allergic/Immunologic: Negative.   Neurological: Negative.   Hematological: Negative.   Psychiatric/Behavioral: Negative.   All other systems are negative.    PFSH:  There is a family history of hypertension.     TOBACCO USE:  History   Smoking Status     Never Smoker   Smokeless Tobacco     Never Used       VITALS:  Vitals:    08/30/17 1336 08/30/17 1422   BP: (!) 160/98 (!) 156/98   Patient Site: Left Arm    Patient Position: Sitting    Cuff Size: Adult Large    Pulse: 72    Weight: (!) 324 lb 1 oz (147 kg)      Wt Readings from Last 3 Encounters:   08/30/17 (!) 324 lb 1 oz (147 kg)   03/29/17 (!) 323 lb (146.5 kg)   03/14/17 (!) 323 lb (146.5 kg)       PHYSICAL EXAM:  Constitutional: Patient is oriented to person, place, and time. Patient appears well-developed and well-nourished. No distress.   Cardiovascular: Normal rate, regular rhythm, normal heart sounds and intact distal pulses. No murmur heard.   Pulmonary/Chest: Effort normal and breath sounds normal. No stridor. No respiratory distress. Patient has no wheezes, no rales, exhibits no tenderness.   Neurological: Patient is alert and oriented to person, place, and time. Patient has normal reflexes. No cranial nerve deficit. Coordination normal.   Skin: Skin is warm and dry. No rash noted. Patient is not diaphoretic. No erythema. No pallor.    Results for orders placed or performed in visit on 03/14/17   Rapid Strep A Screen-Throat   Result Value Ref Range    Rapid Strep A Antigen No Group A Strep detected No Group A Strep detected   Influenza A/B Rapid Test   Result Value Ref Range    Influenza  A, Rapid Antigen Influenza A antigen detected (!) No Influenza A antigen detected    Influenza B, Rapid Antigen No Influenza B antigen detected No Influenza B antigen detected   Group A Strep, RNA Direct Detection, Throat   Result Value Ref Range    Group A Strep by PCR No Group A Strep rRNA detected No Group A Strep rRNA detected       QUALITY  MEASURES:  The following high BMI interventions were performed this visit: encouragement to exercise and lifestyle education regarding diet    ADDITIONAL HISTORY SUMMARIZED (2): None.  DECISION TO OBTAIN EXTRA INFORMATION (1): None.   RADIOLOGY TESTS (1): None.  LABS (1): Ordered labs today.  MEDICINE TESTS (1): None.  INDEPENDENT REVIEW (2 each): None.     I, Pat Willett, am scribing for and in the presence of, Dr. Hewitt.    I, Jimy Oquendo MD , personally performed the services described in this documentation, as scribed by Pat Willett in my presence, and it is both accurate and complete.    MEDICATIONS:  Current Outpatient Prescriptions   Medication Sig Dispense Refill     clotrimazole-betamethasone (LOTRISONE) cream Apply topically 2 (two) times a day. 45 g 0     fluticasone (FLONASE) 50 mcg/actuation nasal spray 1 spray into each nostril daily. 16 g 2     lisinopril (PRINIVIL,ZESTRIL) 20 MG tablet Take 1 tablet (20 mg total) by mouth daily. 30 tablet 0     Current Facility-Administered Medications   Medication Dose Route Frequency Provider Last Rate Last Dose     acetaminophen tablet 975 mg (TYLENOL)  975 mg Oral Q4H PRN Jimy Oquendo MD   975 mg at 03/14/17 1432       Total data points: 1

## 2021-06-13 NOTE — PROGRESS NOTES
Continue with lisinopril. Will add HCTZ.  Work on lifestyle, weight reduction.  Come back I 2 wks for BP check and CMP

## 2021-06-13 NOTE — PROGRESS NOTES
ASSESSMENT/PLAN:  Diagnoses and all orders for this visit:    HTN (hypertension)  Continue with lisinopril 20mg and HCTZ 12.5mg.  Work on lifestyle modification and weight reduction.  F/u in 3 months-     Basic Metabolic Panel    Orders Placed This Encounter   Procedures     Basic Metabolic Panel     CHIEF COMPLAINT:  Chief Complaint   Patient presents with     med check     BP. At night heart rate increases     Medication Refill     bloodwork     potassium       HISTORY OF PRESENT ILLNESS:  Ilan is a 37 y.o. male presenting to the clinic today for a follow up for hypertension. His blood pressure today is elevated today at 142/80. He is still taking the lisinopril 20mg and hydrochlorothiazide 12.5mg. He says that he does not take his medications until around 3 pm. He does not check his blood pressures at home. He will check them periodically at work. He notes that one blood pressure he took at work was 119/71. He has had some episodes of feeling a fast pulse and racing heart that woke him from sleep. He felt this in his neck while he was sleeping on his side. This happened twice with the last episode being 3-4 days ago. he rarely drinks caffeine, and he does not think that it is stress or anxiety related. Denies chest pain or shortness of breath. No headaches or cough. He does note some urinary frequency from the hydrochlorothiazide.     REVIEW OF SYSTEMS:   Constitutional: Negative.   HENT: Negative.   Eyes: Negative.   Respiratory: Negative.   Cardiovascular: Negative.   Gastrointestinal: Negative.   Endocrine: Negative.   Genitourinary: Negative.   Musculoskeletal: Negative.   Skin: Negative.   Allergic/Immunologic: Negative.   Neurological: Negative.   Hematological: Negative.   Psychiatric/Behavioral: Negative.   All other systems are negative.    PFSH:  No new history.     TOBACCO USE:  History   Smoking Status     Never Smoker   Smokeless Tobacco     Never Used       VITALS:  Vitals:    10/04/17 0841  10/04/17 0905   BP: 142/80 128/74   Patient Site: Left Arm    Patient Position: Sitting    Cuff Size: Adult Large    Pulse: 68    Weight: (!) 326 lb 8 oz (148.1 kg)      Wt Readings from Last 3 Encounters:   10/04/17 (!) 326 lb 8 oz (148.1 kg)   08/30/17 (!) 324 lb 1 oz (147 kg)   03/29/17 (!) 323 lb (146.5 kg)       PHYSICAL EXAM:  Constitutional: Patient is oriented to person, place, and time. Patient appears well-developed and well-nourished. No distress.   Cardiovascular: Normal rate, regular rhythm, normal heart sounds and intact distal pulses. No murmur heard.   Pulmonary/Chest: Effort normal and breath sounds normal. No stridor. No respiratory distress. Patient has no wheezes, no rales, exhibits no tenderness.   Neurological: Patient is alert and oriented to person, place, and time. Patient has normal reflexes. No cranial nerve deficit. Coordination normal.   Skin: Skin is warm and dry. No rash noted. Patient is not diaphoretic. No erythema. No pallor.    Results for orders placed or performed in visit on 08/30/17   Lipid Cascade   Result Value Ref Range    Cholesterol 226 (H) <=199 mg/dL    Triglycerides 95 <=149 mg/dL    HDL Cholesterol 36 (L) >=40 mg/dL    LDL Calculated 171 (H) <=129 mg/dL    Patient Fasting > 8hrs? Yes    Comprehensive Metabolic Panel   Result Value Ref Range    Sodium 140 136 - 145 mmol/L    Potassium 4.2 3.5 - 5.0 mmol/L    Chloride 105 98 - 107 mmol/L    CO2 27 22 - 31 mmol/L    Anion Gap, Calculation 8 5 - 18 mmol/L    Glucose 81 70 - 125 mg/dL    BUN 13 8 - 22 mg/dL    Creatinine 0.85 0.70 - 1.30 mg/dL    GFR MDRD Af Amer >60 >60 mL/min/1.73m2    GFR MDRD Non Af Amer >60 >60 mL/min/1.73m2    Bilirubin, Total 0.7 0.0 - 1.0 mg/dL    Calcium 9.1 8.5 - 10.5 mg/dL    Protein, Total 7.2 6.0 - 8.0 g/dL    Albumin 3.7 3.5 - 5.0 g/dL    Alkaline Phosphatase 105 45 - 120 U/L    AST 23 0 - 40 U/L    ALT 27 0 - 45 U/L       QUALITY MEASURES:  The following high BMI interventions were performed  this visit: encouragement to exercise and lifestyle education regarding diet    ADDITIONAL HISTORY SUMMARIZED (2): None.  DECISION TO OBTAIN EXTRA INFORMATION (1): None.   RADIOLOGY TESTS (1): None.  LABS (1): Ordered labs today.  MEDICINE TESTS (1): None.  INDEPENDENT REVIEW (2 each): None.     The visit lasted a total of 7 minutes face to face with the patient. Over 50% of the time was spent counseling and educating the patient about hypertension management.    I, Pat Willett, am scribing for and in the presence of, Dr. Hewitt.    I, Jimy Oquendo MD , personally performed the services described in this documentation, as scribed by Pat Willett in my presence, and it is both accurate and complete.    MEDICATIONS:  Current Outpatient Prescriptions   Medication Sig Dispense Refill     clotrimazole-betamethasone (LOTRISONE) cream Apply topically 2 (two) times a day. 45 g 0     fluticasone (FLONASE) 50 mcg/actuation nasal spray 1 spray into each nostril daily. 16 g 2     hydroCHLOROthiazide (MICROZIDE) 12.5 mg capsule Take 1 capsule (12.5 mg total) by mouth daily. 30 capsule 0     lisinopril (PRINIVIL,ZESTRIL) 20 MG tablet Take 1 tablet (20 mg total) by mouth daily. 30 tablet 0     simvastatin (ZOCOR) 20 MG tablet Take 1 tablet (20 mg total) by mouth every evening. 90 tablet 0     Current Facility-Administered Medications   Medication Dose Route Frequency Provider Last Rate Last Dose     acetaminophen tablet 975 mg (TYLENOL)  975 mg Oral Q4H PRN Jimy Oquendo MD   975 mg at 03/14/17 1432       Total data points: 1

## 2021-06-13 NOTE — PROGRESS NOTES
I met with Ilan Matthew at the request of Dr. Hewitt to recheck his blood pressure.  Blood pressure medications on the MAR were reviewed with patient.    Patient has taken all medications as per usual regimen: Yes  Patient reports tolerating them without any issues or concerns: No- headaches with Lisinopril. Blood pressure taken- 150/80. Recheck- 140/80.    There were no vitals filed for this visit.     After 5 minutes, the patient's blood pressure was < 140/90, the previous encounter was reviewed, patient was instructed to Seen by Dr. Hewitt at time of injection visit- see note. .

## 2021-06-14 NOTE — TELEPHONE ENCOUNTER
RN cannot approve Refill Request    RN can NOT refill this medication Protocol failed and NO refill given. Last office visit: 4/9/2019 Jimy Grace MD Last Physical: 4/3/2019 Last MTM visit: Visit date not found Last visit same specialty: 4/9/2019 Jimy Grace MD.  Next visit within 3 mo: Visit date not found  Next physical within 3 mo: Visit date not found      Meghna Evans, [facility] Triage/Med Refill 12/24/2020    Requested Prescriptions   Pending Prescriptions Disp Refills     lisinopriL (PRINIVIL,ZESTRIL) 20 MG tablet [Pharmacy Med Name: Lisinopril 20 MG Oral Tablet] 30 tablet 0     Sig: Take 1 tablet by mouth once daily       Ace Inhibitors Refill Protocol Failed - 12/22/2020  1:18 PM        Failed - Serum Potassium in past 12 months     No results found for: LN-POTASSIUM          Failed - Serum Creatinine in past 12 months     Creatinine   Date Value Ref Range Status   04/03/2019 1.00 0.70 - 1.30 mg/dL Final             Passed - PCP or prescribing provider visit in past 12 months       Last office visit with prescriber/PCP: 4/9/2019 Jimy Grace MD OR same dept: Visit date not found OR same specialty: 4/9/2019 Jimy Grace MD  Last physical: 4/3/2019 Last MTM visit: Visit date not found   Next visit within 3 mo: Visit date not found  Next physical within 3 mo: Visit date not found  Prescriber OR PCP: Jimy Oquendo MD  Last diagnosis associated with med order: 1. HTN (hypertension)  - lisinopriL (PRINIVIL,ZESTRIL) 20 MG tablet [Pharmacy Med Name: Lisinopril 20 MG Oral Tablet]; Take 1 tablet by mouth once daily  Dispense: 30 tablet; Refill: 0    If protocol passes may refill for 12 months if within 3 months of last provider visit (or a total of 15 months).             Passed - Blood pressure filed in past 12 months     BP Readings from Last 1 Encounters:   08/29/20 119/79

## 2021-06-16 NOTE — PROGRESS NOTES
Assessment/plan   Ilan Matthew is a 38 y.o. male who is New patient to my practice here with   Chief Complaint   Patient presents with     Sore Throat     x1 week; productive cough of yellow/bloody phlegm, SOB 1-2x daily, hurts to swallow, some nasal congestion, denies any other sx           PLAN:  Symptomatic therapy suggested: push fluids, rest, gargle warm salt water, use acetaminophen, ibuprofen, antihistamine-decongestant of choice prn and return office visit prn if symptoms persist or worsen. Lack of antibiotic effectiveness discussed with him. Call or return to clinic prn if these symptoms worsen or fail to improve as anticipated.    Subjective:      HPI: Ilan Matthew is a 38 y.o. male is here with viral flu like symptoms but with CC of     Sore Throat: Patient complains of sore throat. Associated symptoms include chest congestion, bilateral ear pressure, nasal blockage, post nasal drip, sinus and nasal congestion and sore throat.Onset of symptoms was 1 weeks ago, unchanged since that time. He is drinking plenty of fluids. He does not have had recent close exposure to someone with proven streptococcal pharyngitis.      No past medical history on file.  No past surgical history on file.  Fish oil [omega-3 fatty acids]  Current Outpatient Prescriptions   Medication Sig Dispense Refill     clotrimazole-betamethasone (LOTRISONE) cream Apply topically 2 (two) times a day. 45 g 0     hydroCHLOROthiazide (MICROZIDE) 12.5 mg capsule TAKE ONE CAPSULE BY MOUTH ONCE DAILY 30 capsule 11     lisinopril (PRINIVIL,ZESTRIL) 20 MG tablet Take 1 tablet (20 mg total) by mouth daily. 90 tablet 0     simvastatin (ZOCOR) 20 MG tablet Take 1 tablet (20 mg total) by mouth every evening. 90 tablet 1     codeine-guaiFENesin (GUAIFENESIN AC)  mg/5 mL liquid Take 5 mL by mouth 3 (three) times a day as needed for cough. 118 mL 0     fluticasone (FLONASE ALLERGY RELIEF) 50 mcg/actuation nasal spray 2 spray each side of the  nose at bed time , please also use nasal saline drops to prevent dryness 16 g 12     loratadine-pseudoephedrine (CLARITIN-D 12 HOUR) 5-120 mg Tb12 Take 1 tablet by mouth 2 (two) times a day. 30 tablet 0     Current Facility-Administered Medications   Medication Dose Route Frequency Provider Last Rate Last Dose     acetaminophen tablet 975 mg (TYLENOL)  975 mg Oral Q4H PRN Jimy Oquendo MD   975 mg at 03/14/17 1432     Family History   Problem Relation Age of Onset     Hypertension Mother        Patient Active Problem List   Diagnosis     Obesity     Hypertrophy Of Breast       Review of Systems  Pertinent ROS noted in HPI    Social History     Social History Narrative       Objective:     Vitals:    03/13/18 1202 03/13/18 1233   BP: 150/90 140/86   Pulse: 80    Temp: 98.1  F (36.7  C)    TempSrc: Oral    SpO2: 100%    Weight: (!) 334 lb 6.4 oz (151.7 kg)        Physical Exam:     General: Alert, no acute distress.   HEENT: normocephalic conjunctivae are clear, Normal pearly TMs + fluid. Position and landmarks are normal.  Nose is clear.  Oropharynx mild erythema , without tonsillar hypertrophy, asymmetry, exudate or lesions.  Neck: supple without adenopathy or thyromegaly.  Lungs: Good aeration bilaterally. No prolongation of expiratory phase.   No tachypnea, retractions, or increased work of breathing. Clear to auscultation without wheezes, rales or rhonci.    Heart: regular rate and rhythm, normal S1 and S2, no murmurs  Abdomen: soft and nontender, bowel sounds are present, no hepatosplenomegaly or mass palpable.  Skin: clear without rash or lesions  Neuro: alert, interactive moving all extremities equally, normal muscle tone in all 4 extremities, deep tendon reflexes 2+ symmetrically at the patella      Latha Benitez MD

## 2021-06-16 NOTE — TELEPHONE ENCOUNTER
Reason for Call: Request for an order or referral:    Order or referral being requested: FAMILY MARRIAGE THERAPY    Date needed: at your convenience    Additional comments: Pt requesting recommendations on a marriage therapist.     Phone number Patient can be reached at:  Home number on file [phone] (home)    Best Time:  Anytime     Can we leave a detailed message on this number?  Yes    Call taken on 3/23/2021 at 11:21 AM by Miri Giang

## 2021-06-16 NOTE — TELEPHONE ENCOUNTER
Telephone Encounter by Kathleen Mccain at 4/15/2019  7:51 AM     Author: Kathleen Mccain Service: -- Author Type: --    Filed: 4/15/2019  7:52 AM Encounter Date: 4/10/2019 Status: Signed    : Kathleen Mccain APPROVED:    Approval start date:04/12/2019  Approval end date:10/09/2019    Pharmacy has been notified of approval and will contact patient when medication is ready for pickup.

## 2021-06-16 NOTE — TELEPHONE ENCOUNTER
Same deal.  I usually have them contact their insurance, but I will say that most end up at [facility] or [facility]    TS

## 2021-06-17 NOTE — PROGRESS NOTES
ASSESSMENT/PLAN:  38-year-old gentleman presented with throat pain that started following upper respiratory tract infection.  He was seen a couple weeks ago with a negative rapid strep.  Pain is slightly better and so is the cough.  He may continue to treat his cough with Benadryl.  Examination is unremarkable.  I provided him with options such as observation, a short course of prednisone, or ENT.  Being that he has been dealing with this them for a couple weeks, he will like to see ENT.  Moreover, his symptoms are intermittent enough that he can continue with Benadryl and over-the-counter analgesics rather than prednisone.  Imaging at this point is not indicated.  The patient was agreeable to this plan.    Throat pain  -     Ambulatory referral to ENT    Environmental allergies  Continue with Claritin but avoid long-term use of the decongestant    HTN (hypertension)  Stable on hydrochlorothiazide 12.5 mg and lisinopril 20 mg      Orders Placed This Encounter   Procedures     Ambulatory referral to ENT     Referral Priority:   Routine     Referral Type:   Consultation     Referral Reason:   Evaluation and Treatment     Requested Specialty:   Otolaryngology     Number of Visits Requested:   1           CHIEF COMPLAINT:  Chief Complaint   Patient presents with     Sore Throat     pain on throat, not constant pain, saw Dr Benitez few weeks ago. Benadryl helped the pain. Hurts to cough, feel like there is some in his throat       HISTORY OF PRESENT ILLNESS:  Ilan is a 38 y.o. male presenting to the clinic today for a sore throat. He saw Dr. Benitez on 3/13/2018 for a similar problem, and his strep test was negative. This started 2 months ago with cough and cold, and resolved after 2 weeks. He had a lingering cough that was causing him a significant sore throat and pain. Dr. Benitez gave him some Claritin to help with his throat symptoms, but he did not think that this helped him. He has found that Benadryl has helped his  cough and throat. In the past two weeks, the coughing has decreased. However, when he does cough, he feels sharp pain in his throat. The pain in the throat has moved to the left lower neck and throat for him. He can have pain with swallowing, but it is worse with coughing. The pain is improved from a few weeks ago. Denies fevers, chills, shortness of breath, hemoptysis, night sweats. He says that his cough can be occasionally productive for him. He does feel like something is stuck in his throat. He does not think that he swallowed any fish or chicken bone, and he does not recall himself choking in the past month or so. His children have been healthy. He does have seasonal allergies, and will take Claritin or use Flonase for his allergy symptoms.     Hypertension: His blood pressure is stable today at 136/86. He continues the lisinopril 20mg and hydrochlorothiazide 12.5mg daily for his blood pressure. He says that he has been mostly consistent with his blood pressure medications.     REVIEW OF SYSTEMS:   Constitutional: Negative.   Eyes: Negative.   Cardiovascular: Negative.   Gastrointestinal: Negative.   Endocrine: Negative.   Genitourinary: Negative.   Musculoskeletal: Negative.   Skin: Negative.   Allergic/Immunologic: Negative.   Neurological: Negative.   Hematological: Negative.   Psychiatric/Behavioral: Negative.   All other systems are negative.    PFSH:  No new history.     TOBACCO USE:  History   Smoking Status     Never Smoker   Smokeless Tobacco     Never Used       VITALS:  Vitals:    04/03/18 1427   BP: 136/86   Pulse: 81   SpO2: 100%   Weight: (!) 334 lb 4 oz (151.6 kg)     Wt Readings from Last 3 Encounters:   04/03/18 (!) 334 lb 4 oz (151.6 kg)   03/13/18 (!) 334 lb 6.4 oz (151.7 kg)   10/04/17 (!) 326 lb 8 oz (148.1 kg)       PHYSICAL EXAM:  Constitutional: Patient is oriented to person, place, and time. Patient appears well-developed and well-nourished. No distress.   Head: Normocephalic and  atraumatic.   Right Ear: External ear normal.   Left Ear: External ear normal.   Nose: Nose normal.   Mouth/Throat: Oropharynx is clear and moist. No oropharyngeal exudate.   Lymphadenopathy:  Patient has no cervical adenopathy.   Neurological: Patient is alert and oriented to person, place, and time.   Skin: Skin is warm and dry. No rash noted. Patient is not diaphoretic. No erythema. No pallor.    Results for orders placed or performed in visit on 03/13/18   Rapid Strep A Screen-Throat   Result Value Ref Range    Rapid Strep A Antigen No Group A Strep detected, presumptive negative No Group A Strep detected, presumptive negative   Group A Strep, RNA Direct Detection, Throat   Result Value Ref Range    Group A Strep by PCR No Group A Strep rRNA detected No Group A Strep rRNA detected         ADDITIONAL HISTORY SUMMARIZED (2): Reviewed and summarized note from Dr. Benitez from 3/13/2018 regarding sore throat.  DECISION TO OBTAIN EXTRA INFORMATION (1): None.   RADIOLOGY TESTS (1): None.  LABS (1): None.  MEDICINE TESTS (1): None.  INDEPENDENT REVIEW (2 each): None.     IPat, am scribing for and in the presence of, Dr. Hewitt.    Jimy BROWN MD personally performed the services described in this documentation, as scribed by Pat Willett in my presence, and it is both accurate and complete.    MEDICATIONS:  Current Outpatient Prescriptions   Medication Sig Dispense Refill     fluticasone (FLONASE ALLERGY RELIEF) 50 mcg/actuation nasal spray 2 spray each side of the nose at bed time , please also use nasal saline drops to prevent dryness 16 g 12     hydroCHLOROthiazide (MICROZIDE) 12.5 mg capsule TAKE ONE CAPSULE BY MOUTH ONCE DAILY 30 capsule 11     lisinopril (PRINIVIL,ZESTRIL) 20 MG tablet Take 1 tablet (20 mg total) by mouth daily. 90 tablet 0     loratadine-pseudoephedrine (CLARITIN-D 12 HOUR) 5-120 mg Tb12 Take 1 tablet by mouth 2 (two) times a day. 30 tablet 0      simvastatin (ZOCOR) 20 MG tablet Take 1 tablet (20 mg total) by mouth every evening. 90 tablet 1     clotrimazole-betamethasone (LOTRISONE) cream Apply topically 2 (two) times a day. 45 g 0     codeine-guaiFENesin (GUAIFENESIN AC)  mg/5 mL liquid Take 5 mL by mouth 3 (three) times a day as needed for cough. 118 mL 0     Current Facility-Administered Medications   Medication Dose Route Frequency Provider Last Rate Last Dose     acetaminophen tablet 975 mg (TYLENOL)  975 mg Oral Q4H PRN Jimy Oquendo MD   975 mg at 03/14/17 1432       Total data points: 2

## 2021-06-19 NOTE — PROGRESS NOTES
ASSESSMENT/PLAN:       1. Plantar fasciitis    - Ambulatory referral to Podiatry  - triamcinolone acetonide 40 mg/mL injection 40 mg (KENALOG-40); 1 mL (40 mg total) by Other route once.  -I suggested that he not plan on working tonight  Referral to podiatry for consideration of other treatment plan if the symptoms are not improved and also to get a new set of orthotics.  Reviewed with him some stretching exercises for the plantar fascia and he is aware that he should not walk barefoot.  We talked about a night splint but he is a stomach sleeper and feel that would be very cumbersome for him to use..    Procedure note: I explained to the patient how I would do the injection and I reviewed his medications and allergies and he wants to proceed with that on the right side.  The area was prepped with chlorhexidine and a medial heel approach was used for the injection with 40 mg of triamcinolone and 2 cc of 0.25% bupivacaine.  I used a 27-gauge needle and advanced the needle to the midportion of the heel and the injection went in easily without any complications.  The patient immediately had some relief of his pain as expected from the anesthetic.        Stef Stevens MD      PROGRESS NOTE   8/3/2018    SUBJECTIVE:  Ilan Matthew is a 38 y.o. male  who presents for   Chief Complaint   Patient presents with     Heel Pain     Patient has been having off and on bilateral heel pain for the past year. No known injury. Patient has had past cortisone injections in the left heel, which has helped.      #1 bilateral heel pain right worse than left  The patient has had a history of plantar fasciitis initially on the left side which a bit over a year ago was treated on 2 occasions with steroid injections in the left side is been much improved but now over the last few months the right heel is been very painful flaring up extensively over the last few days.  Last night at work he had a very difficult time getting around because  of the intense pain.  The pain is worse after he sat for a while her first thing in the morning when getting up.  He has a pair of orthotics that he got from his chiropractor that are over a year old and he has gotten special shoes at [employer] which are custom-made.  Because of the intensity of his pain today he was hoping for injection in the right heel.  The patient has no allergies to medication.  The patient works in the [employer] [facility] [employer] and is on his feet a lot with his work.  Exposed to work tonight and over the weekend and feels that it would be very hard to do his job this he already see some improvement from the injection.    Patient Active Problem List   Diagnosis     Obesity     Hypertrophy Of Breast       Current Outpatient Prescriptions   Medication Sig Dispense Refill     clotrimazole-betamethasone (LOTRISONE) cream Apply topically 2 (two) times a day. 45 g 0     fluticasone (FLONASE ALLERGY RELIEF) 50 mcg/actuation nasal spray 2 spray each side of the nose at bed time , please also use nasal saline drops to prevent dryness 16 g 12     hydroCHLOROthiazide (MICROZIDE) 12.5 mg capsule TAKE ONE CAPSULE BY MOUTH ONCE DAILY 30 capsule 11     lisinopril (PRINIVIL,ZESTRIL) 20 MG tablet Take 1 tablet (20 mg total) by mouth daily. 90 tablet 3     loratadine-pseudoephedrine (CLARITIN-D 12 HOUR) 5-120 mg Tb12 Take 1 tablet by mouth 2 (two) times a day. 30 tablet 0     Multivits/Iron Fum/FA/D3/Lycop (MULTI FOR HIM ORAL) Take by mouth.       simvastatin (ZOCOR) 20 MG tablet Take 1 tablet (20 mg total) by mouth every evening. 90 tablet 1     codeine-guaiFENesin (GUAIFENESIN AC)  mg/5 mL liquid Take 5 mL by mouth 3 (three) times a day as needed for cough. 118 mL 0     Current Facility-Administered Medications   Medication Dose Route Frequency Provider Last Rate Last Dose     acetaminophen tablet 975 mg (TYLENOL)  975 mg Oral Q4H PRN Jimy Oquendo MD   695  mg at 03/14/17 1432     triamcinolone acetonide 40 mg/mL injection 40 mg (KENALOG-40)  40 mg Other Once Stef Stevens MD           History   Smoking Status     Never Smoker   Smokeless Tobacco     Never Used           OBJECTIVE:        No results found for this or any previous visit (from the past 240 hour(s)).    Vitals:    08/03/18 1304   BP: 126/78   Patient Site: Right Arm   Patient Position: Sitting   Cuff Size: Adult Large   Pulse: 86   SpO2: 98%   Weight: (!) 320 lb (145.2 kg)     Weight: 320 lb (145.2 kg)        Physical Exam:  GENERAL APPEARANCE: Very pleasant 38-year-old male, NAD, well hydrated, well nourished  SKIN:  Normal skin turgor, no lesions/rashes, with exam directed towards the lower legs and feet.  EXTREMITY: no edema and full ROM of all joints, tender right foot medial and plantar surface of the heel.  No swelling redness warmth.  No tenderness over the posterior heel in the area of the Achilles tendon,   NEURO: no focal findings,  with no decreased sensation on the foot

## 2021-06-19 NOTE — LETTER
Letter by Jimy Grace MD at      Author: Jimy Grace MD Service: -- Author Type: --    Filed:  Encounter Date: 4/11/2019 Status: (Other)         April 11, 2019     Patient: Ilan Matthew   YOB: 1980           To Whom It May Concern:    It is my medical opinion that Ilan Matthew be put on light duties with minimal weight bearing activities effective immediately until 4/22/19.    If you have any questions or concerns, please don't hesitate to call.    Sincerely,        Electronically signed by Jimy Oquendo MD

## 2021-06-24 NOTE — TELEPHONE ENCOUNTER
Who is calling:  Patient  Reason for Call:  Checking status of getting HCTZ 12.5mg filled.  He is out of medication.  Date of last appointment with primary care: 8/14/18  Has the patient been recently seen:  No  Okay to leave a detailed message: Yes    Please call patient with update regarding this filled

## 2021-06-24 NOTE — TELEPHONE ENCOUNTER
Refill Approved    Rx renewed per Medication Renewal Policy. Medication was last renewed on 11/18/17.    Meghna Evans, [facility] Triage/Med Refill 2/28/2019     Requested Prescriptions   Pending Prescriptions Disp Refills     hydroCHLOROthiazide (MICROZIDE) 12.5 mg capsule [Pharmacy Med Name: HYDROCHLOROTHIAZIDE 12.5MG CAP] 30 capsule 11     Sig: TAKE ONE CAPSULE BY MOUTH ONCE DAILY    Diuretics/Combination Diuretics Refill Protocol  Failed - 2/28/2019  5:41 PM       Failed - Serum Potassium in past 12 months     No results found for: LN-POTASSIUM         Failed - Serum Sodium in past 12 months     No results found for: LN-SODIUM         Failed - Serum Creatinine in past 12 months     Creatinine   Date Value Ref Range Status   10/04/2017 0.95 0.70 - 1.30 mg/dL Final            Passed - Visit with PCP or prescribing provider visit in past 12 months    Last office visit with prescriber/PCP: 4/3/2018 Jimy Grace MD OR same dept: 4/3/2018 Jimy Grace MD OR same specialty: 8/3/2018 Stef Stevens MD  Last physical: 2/27/2017 Last MTM visit: Visit date not found   Next visit within 3 mo: Visit date not found  Next physical within 3 mo: Visit date not found  Prescriber OR PCP: Jimy Oquendo MD  Last diagnosis associated with med order: 1. HTN (hypertension)  - hydroCHLOROthiazide (MICROZIDE) 12.5 mg capsule [Pharmacy Med Name: HYDROCHLOROTHIAZIDE 12.5MG CAP]; TAKE ONE CAPSULE BY MOUTH ONCE DAILY  Dispense: 30 capsule; Refill: 11    2. Dyslipidemia  - simvastatin (ZOCOR) 20 MG tablet [Pharmacy Med Name: SIMVASTATIN 20MG  TAB]; TAKE ONE TABLET BY MOUTH IN THE EVENING  Dispense: 30 tablet; Refill: 5    If protocol passes may refill for 12 months if within 3 months of last provider visit (or a total of 15 months).            Passed - Blood pressure on file in past 12 months    BP Readings from Last 1 Encounters:   08/03/18 126/78             simvastatin (ZOCOR) 20 MG  tablet [Pharmacy Med Name: SIMVASTATIN 20MG  TAB] 30 tablet 5     Sig: TAKE ONE TABLET BY MOUTH IN THE EVENING    Statins Refill Protocol (Hmg CoA Reductase Inhibitors) Passed - 2/28/2019  5:41 PM       Passed - PCP or prescribing provider visit in past 12 months     Last office visit with prescriber/PCP: 4/3/2018 Jimy Grace MD OR same dept: 4/3/2018 Jimy Grace MD OR same specialty: 8/3/2018 Stef Stevens MD  Last physical: 2/27/2017 Last MTM visit: Visit date not found   Next visit within 3 mo: Visit date not found  Next physical within 3 mo: Visit date not found  Prescriber OR PCP: Jimy Oquendo MD  Last diagnosis associated with med order: 1. HTN (hypertension)  - hydroCHLOROthiazide (MICROZIDE) 12.5 mg capsule [Pharmacy Med Name: HYDROCHLOROTHIAZIDE 12.5MG CAP]; TAKE ONE CAPSULE BY MOUTH ONCE DAILY  Dispense: 30 capsule; Refill: 11    2. Dyslipidemia  - simvastatin (ZOCOR) 20 MG tablet [Pharmacy Med Name: SIMVASTATIN 20MG  TAB]; TAKE ONE TABLET BY MOUTH IN THE EVENING  Dispense: 30 tablet; Refill: 5    If protocol passes may refill for 12 months if within 3 months of last provider visit (or a total of 15 months).

## 2021-06-24 NOTE — TELEPHONE ENCOUNTER
RN cannot approve Refill Request    RN can NOT refill this medication PCP messaged that patient is overdue for Labs.     Meghna Evans, [facility] Triage/Med Refill 2/28/2019    Requested Prescriptions   Pending Prescriptions Disp Refills     hydroCHLOROthiazide (MICROZIDE) 12.5 mg capsule [Pharmacy Med Name: HYDROCHLOROTHIAZIDE 12.5MG CAP] 90 capsule 0     Sig: TAKE ONE CAPSULE BY MOUTH ONCE DAILY    Diuretics/Combination Diuretics Refill Protocol  Failed - 2/28/2019  5:41 PM       Failed - Serum Potassium in past 12 months     No results found for: LN-POTASSIUM         Failed - Serum Sodium in past 12 months     No results found for: LN-SODIUM         Failed - Serum Creatinine in past 12 months     Creatinine   Date Value Ref Range Status   10/04/2017 0.95 0.70 - 1.30 mg/dL Final            Passed - Visit with PCP or prescribing provider visit in past 12 months    Last office visit with prescriber/PCP: 4/3/2018 Jimy Grace MD OR same dept: 4/3/2018 Jimy Grace MD OR same specialty: 8/3/2018 Stef Stevens MD  Last physical: 2/27/2017 Last MTM visit: Visit date not found   Next visit within 3 mo: Visit date not found  Next physical within 3 mo: Visit date not found  Prescriber OR PCP: Jimy Oquendo MD  Last diagnosis associated with med order: 1. HTN (hypertension)  - hydroCHLOROthiazide (MICROZIDE) 12.5 mg capsule [Pharmacy Med Name: HYDROCHLOROTHIAZIDE 12.5MG CAP]; TAKE ONE CAPSULE BY MOUTH ONCE DAILY  Dispense: 90 capsule; Refill: 0    2. Dyslipidemia  - simvastatin (ZOCOR) 20 MG tablet; TAKE ONE TABLET BY MOUTH IN THE EVENING  Dispense: 90 tablet; Refill: 0    If protocol passes may refill for 12 months if within 3 months of last provider visit (or a total of 15 months).            Passed - Blood pressure on file in past 12 months    BP Readings from Last 1 Encounters:   08/03/18 126/78           Signed Prescriptions Disp Refills     simvastatin (ZOCOR)  20 MG tablet 90 tablet 0     Sig: TAKE ONE TABLET BY MOUTH IN THE EVENING    Statins Refill Protocol (Hmg CoA Reductase Inhibitors) Passed - 2/28/2019  5:41 PM       Passed - PCP or prescribing provider visit in past 12 months     Last office visit with prescriber/PCP: 4/3/2018 Jimy Grace MD OR same dept: 4/3/2018 Jimy Grace MD OR same specialty: 8/3/2018 Stef Stevens MD  Last physical: 2/27/2017 Last MTM visit: Visit date not found   Next visit within 3 mo: Visit date not found  Next physical within 3 mo: Visit date not found  Prescriber OR PCP: Jimy Oquendo MD  Last diagnosis associated with med order: 1. HTN (hypertension)  - hydroCHLOROthiazide (MICROZIDE) 12.5 mg capsule [Pharmacy Med Name: HYDROCHLOROTHIAZIDE 12.5MG CAP]; TAKE ONE CAPSULE BY MOUTH ONCE DAILY  Dispense: 90 capsule; Refill: 0    2. Dyslipidemia  - simvastatin (ZOCOR) 20 MG tablet; TAKE ONE TABLET BY MOUTH IN THE EVENING  Dispense: 90 tablet; Refill: 0    If protocol passes may refill for 12 months if within 3 months of last provider visit (or a total of 15 months).

## 2021-06-25 NOTE — TELEPHONE ENCOUNTER
6-2-2021  Reason for Call:  Medication or medication refill:    Do you use a [facility]?  Name of the pharmacy and phone number for the current request: [facility]     Name of the medication requested:   lisinopriL (PRINIVIL,ZESTRIL) 20 MG tablet  &   hydroCHLOROthiazide (MICROZIDE) 12.5 mg capsule  &   simvastatin (ZOCOR) 20 MG tablet    Other request: none    Can we leave a detailed message on this number? Yes    Phone number patient can be reached at: Cell number on file:    Telephone Information:   Mobile [phone]       Best Time: anytime    Call taken on 6/2/2021 at 1:59 PM by Indiana Healy

## 2021-06-27 NOTE — PROGRESS NOTES
Progress Notes by Jimy Grace MD at 4/3/2019 11:00 AM     Author: Jimy Grace MD Service: -- Author Type: Physician    Filed: 4/3/2019 12:26 PM Encounter Date: 4/3/2019 Status: Signed    : Jimy Grace MD (Physician)       MALE PREVENTATIVE EXAM    Assessment and Plan:   Routine general medical examination at a health care facility  -     Td, Preservative Free (green label)  We discussed healthy lifestyle, nutrition, cardiovascular risk reduction, self care, safety, sunscreen, seatbelt, and timing of cancer screening.  Health maintenance screening and immunizations reviewed with the patient.    Essential hypertension  Stable on hydrochlorothiazide 12.5 mg and lisinopril 20 mg  -     Comprehensive Metabolic Panel    Dyslipidemia  Continue with simvastatin 20 mg.  -     Lipid Cascade    Morbid obesity (H)  We will start him on Belviq.  Continue with healthy lifestyle modification.  Motivational interviewing was utilized today.  Follow-up in 1 month    Plantar fasciitis of right foot  He will come back next week for steroid injection    Sleep Dysfunction  Continue with Benadryl as needed.  May want to add melatonin.    Next follow up:  Return in about 6 months (around 10/3/2019).    Immunization Review  Adult Imm Review: Due today, orders placed    I discussed the following with the patient:   Adult Healthy Living: Importance of regular exercise  Healthy nutrition      Subjective:   Chief Complaint: Ilan Matthew is an 39 y.o. male here for a preventative health visit.     HPI:    He has lost about 30 pounds due to healthy eating and exercise.  He has tried phentermine and contra rave in the past.  Phentermine because palpitation and contrave caused nausea.      He has been however having right heel pain which has been affecting his exercise.  He has had steroid injection to both his heels in the past by podiatry.    Recently he has been having difficulty  "sleeping.  He has been taking Benadryl which has been helping.  He is wondering if he can take melatonin as well.    Healthy Habits  Are you taking a daily aspirin? No  Do you typically exercising at least 40 min, 3-4 times per week?  Yes  Do you usually eat at least 4 servings of fruit and vegetables a day, include whole grains and fiber and avoid regularly eating high fat foods? Yes  Have you had an eye exam in the past two years? Yes  Do you see a dentist twice per year? Yes  Do you have any concerns regarding sleep? No    Safety Screen  If you own firearms, are they secured in a locked gun cabinet or with trigger locks? The patient does not own any firearms  Do you feel you are safe where you are living?: Yes (8/3/2018  1:04 PM)  Do you feel you are safe in your relationship(s)?: Yes (8/3/2018  1:04 PM)      Review of Systems:  Please see above.  The rest of the review of systems are negative for all systems.     Cancer Screening     Patient has no health maintenance due at this time          Patient Care Team:  Jimy Grace MD as PCP - General (Family Medicine)        History     Reviewed By Date/Time Sections Reviewed    Sammie Martinez CMA 4/3/2019 11:04 AM Tobacco            Objective:   Vital Signs:   Visit Vitals  /64 (Patient Site: Left Arm, Patient Position: Sitting, Cuff Size: Adult Large)   Pulse 60   Ht 5' 7.5\" (1.715 m)   Wt (!) 304 lb 6 oz (138.1 kg)   BMI 46.97 kg/m           PHYSICAL EXAM    Objective:    Physical Exam   Vitals:    04/03/19 1104   BP: 112/64   Pulse: 60      Constitutional: Patient is oriented to person, place, and time. Patient appears well-developed and well-nourished. No distress.   Head: Normocephalic and atraumatic.   Right Ear: External ear normal. Normal TM  Left Ear: External ear normal. Normal TM  Nose: Nose normal.   Mouth/Throat: Oropharynx is clear and moist. No oropharyngeal exudate.   Eyes: Conjunctivae and EOM are normal. Pupils are equal, " round, and reactive to light. Right eye exhibits no discharge. Left eye exhibits no discharge. No scleral icterus.   Neck: Neck supple. No JVD present. No tracheal deviation present. No thyromegaly present.   Cardiovascular: Normal rate, regular rhythm, normal heart sounds and intact distal pulses. No murmur heard.   Pulmonary/Chest: Effort normal and breath sounds normal. No stridor. No respiratory distress. Patient has no wheezes, no rales, exhibits no tenderness.   Abdominal: Soft. Bowel sounds are normal. Patient exhibits no distension and no mass. There is no tenderness. There is no rebound and no guarding.   Lymphadenopathy:  Patient has no cervical adenopathy.   Neurological: Patient is alert and oriented to person, place, and time. Patient has normal reflexes. No cranial nerve deficit. Coordination normal.   Skin: Skin is warm and dry. No rash noted. Patient is not diaphoretic. No erythema. No pallor.       The 10-year ASCVD risk score ([place] ZHANE Jr., et al., 2013) is: 4.9%    Values used to calculate the score:      Age: 39 years      Sex: Male      Is Non- : Yes      Diabetic: No      Tobacco smoker: No      Systolic Blood Pressure: 112 mmHg      Is BP treated: Yes      HDL Cholesterol: 36 mg/dL      Total Cholesterol: 226 mg/dL         Medication List           Accurate as of 4/3/19 12:24 PM. If you have any questions, ask your nurse or doctor.               START taking these medications    lorcaserin 10 mg Tab  INSTRUCTIONS:  Take 10 mg by mouth 2 (two) times a day. Start with 1 tab daily for 1 week then increase to 1 tab BID  Started by:  Jimy Oquendo MD           CONTINUE taking these medications    clotrimazole-betamethasone cream  Also known as:  LOTRISONE  INSTRUCTIONS:  Apply topically 2 (two) times a day.  Reason for med:  KNOWN/SUSPECTED INFECTION        hydroCHLOROthiazide 12.5 mg capsule  Also known as:  MICROZIDE  INSTRUCTIONS:  TAKE 1 CAPSULE BY MOUTH  ONCE DAILY PLEASE  FOLLOW  UP  WITH  YOUR  PRIMARY  CARE  PHYSICIAN  FOR  LABS  AND  MORE  REFILLS.  Doctor's comments:  Please consider 90 day supplies to promote better adherence        lisinopril 20 MG tablet  Also known as:  PRINIVIL,ZESTRIL  INSTRUCTIONS:  Take 1 tablet (20 mg total) by mouth daily.        loratadine-pseudoephedrine 5-120 mg Tb12  Also known as:  CLARITIN-D 12 HOUR  INSTRUCTIONS:  Take 1 tablet by mouth 2 (two) times a day.        MULTI FOR HIM ORAL  INSTRUCTIONS:  Take by mouth.        simvastatin 20 MG tablet  Also known as:  ZOCOR  INSTRUCTIONS:  TAKE ONE TABLET BY MOUTH IN THE EVENING  Doctor's comments:  Please consider 90 day supplies to promote better adherence              Where to Get Your Medications      These medications were sent to [facility] [address] [place] [address] [id number] [facility] [address]    Phone:  [phone]     lorcaserin 10 mg Tab         Additional Screenings Completed Today:

## 2021-06-29 ENCOUNTER — OFFICE VISIT - HEALTHEAST (OUTPATIENT)
Dept: FAMILY MEDICINE | Facility: CLINIC | Age: 41
End: 2021-06-29

## 2021-06-29 DIAGNOSIS — Z12.5 SCREENING FOR PROSTATE CANCER: ICD-10-CM

## 2021-06-29 DIAGNOSIS — I10 ESSENTIAL HYPERTENSION: ICD-10-CM

## 2021-06-29 DIAGNOSIS — E78.5 DYSLIPIDEMIA: ICD-10-CM

## 2021-06-29 DIAGNOSIS — E66.01 MORBID OBESITY (H): ICD-10-CM

## 2021-06-29 LAB
ALBUMIN SERPL-MCNC: 3.6 G/DL (ref 3.5–5)
ALP SERPL-CCNC: 115 U/L (ref 45–120)
ALT SERPL W P-5'-P-CCNC: 24 U/L (ref 0–45)
ANION GAP SERPL CALCULATED.3IONS-SCNC: 11 MMOL/L (ref 5–18)
AST SERPL W P-5'-P-CCNC: 18 U/L (ref 0–40)
BILIRUB SERPL-MCNC: 0.4 MG/DL (ref 0–1)
BUN SERPL-MCNC: 14 MG/DL (ref 8–22)
CALCIUM SERPL-MCNC: 8.9 MG/DL (ref 8.5–10.5)
CHLORIDE BLD-SCNC: 103 MMOL/L (ref 98–107)
CHOLEST SERPL-MCNC: 148 MG/DL
CO2 SERPL-SCNC: 26 MMOL/L (ref 22–31)
CREAT SERPL-MCNC: 0.92 MG/DL (ref 0.7–1.3)
FASTING STATUS PATIENT QL REPORTED: YES
GFR SERPL CREATININE-BSD FRML MDRD: >60 ML/MIN/1.73M2
GLUCOSE BLD-MCNC: 105 MG/DL (ref 70–125)
HDLC SERPL-MCNC: 37 MG/DL
LDLC SERPL CALC-MCNC: 102 MG/DL
POTASSIUM BLD-SCNC: 3.8 MMOL/L (ref 3.5–5)
PROT SERPL-MCNC: 6.8 G/DL (ref 6–8)
PSA SERPL-MCNC: 0.3 NG/ML (ref 0–2.5)
SODIUM SERPL-SCNC: 140 MMOL/L (ref 136–145)
TRIGL SERPL-MCNC: 44 MG/DL

## 2021-06-29 ASSESSMENT — MIFFLIN-ST. JEOR: SCORE: 2372.5

## 2021-07-02 ENCOUNTER — RECORDS - HEALTHEAST (OUTPATIENT)
Dept: ADMINISTRATIVE | Facility: CLINIC | Age: 41
End: 2021-07-02

## 2021-07-03 NOTE — ADDENDUM NOTE
Addendum Note by Jimy Plata MD at 5/10/2017  9:27 AM     Author: Jimy Plata MD Service: -- Author Type: Physician    Filed: 5/10/2017  9:27 AM Encounter Date: 3/1/2017 Status: Signed    : Jimy Plata MD (Physician)    Addended by: JIMY PLATA on: 5/10/2017 09:27 AM        Modules accepted: Orders

## 2021-07-03 NOTE — ADDENDUM NOTE
Addendum Note by Jimy Plata MD at 1/25/2017  7:58 AM     Author: Jimy Plata MD Service: -- Author Type: Physician    Filed: 1/25/2017  7:58 AM Encounter Date: 1/24/2017 Status: Signed    : Jimy Plata MD (Physician)    Addended by: JIMY PLATA on: 1/25/2017 07:58 AM        Modules accepted: Orders

## 2021-07-03 NOTE — ADDENDUM NOTE
Addendum Note by Jimy Plata MD at 2/3/2020  7:27 AM     Author: Jimy Plata MD Service: -- Author Type: Physician    Filed: 2/3/2020  7:27 AM Encounter Date: 1/31/2020 Status: Signed    : Jimy Plata MD (Physician)    Addended by: JIMY PLATA on: 2/3/2020 07:27 AM        Modules accepted: Orders

## 2021-07-03 NOTE — ADDENDUM NOTE
Addendum Note by Jimy Plata MD at 4/9/2019 11:20 AM     Author: Jimy Plata MD Service: -- Author Type: Physician    Filed: 4/10/2019  3:44 PM Encounter Date: 4/9/2019 Status: Signed    : Jimy Plata MD (Physician)    Addended by: JIMY PLATA on: 4/10/2019 03:44 PM        Modules accepted: Orders

## 2021-07-04 NOTE — PROGRESS NOTES
Progress Notes by Jimy Grace MD at 6/3/2021  1:20 PM     Author: Jimy Grace MD Service: -- Author Type: Physician    Filed: 6/3/2021  2:31 PM Encounter Date: 6/3/2021 Status: Signed    : Jimy Grace MD (Physician)       MALE PREVENTATIVE EXAM    Assessment and Plan:     Patient has been advised of split billing requirements and indicates understanding: Yes    Ilan was seen today for annual exam.    Routine general medical examination at a health care facility  We discussed healthy lifestyle, nutrition, cardiovascular risk reduction, self care, safety, sunscreen, and timing of cancer screening.  Health maintenance screening and immunizations reviewed with the patient.  Follow up yearly for the annual physical.     Essential hypertension  -     hydroCHLOROthiazide (HYDRODIURIL) 25 MG tablet; Take 1 tablet (25 mg total) by mouth daily.  Stop lisinopril to see if the cough will improve  By increase hydrochlorothiazide to 25 mg  Would like him to come back in 1 month for blood pressure recheck and fasting labs  Counseled healthy lifestyle modifications    Class 3 severe obesity due to excess calories with serious comorbidity and body mass index (BMI) of 50.0 to 59.9 in adult (H)  Counseled healthy lifestyle modifications    Dyslipidemia  -     simvastatin (ZOCOR) 20 MG tablet; Take 1 tablet (20 mg total) by mouth every evening.  Like him to come back in 1 month for fasting labs    Cough  -     codeine-guaiFENesin (GUAIFENESIN AC)  mg/5 mL liquid; Take 10 mL by mouth 3 (three) times a day as needed for cough.  We will refill guaifenesin with codeine.  Will stop lisinopril and monitor his cough  We will give him Zyrtec and refill of Flonase for allergies    Seasonal allergic rhinitis, unspecified trigger  Start Zyrtec  Refill Flonase  -     fluticasone propionate (FLONASE) 50 mcg/actuation nasal spray; Apply 1 spray into each nostril daily.  -      cetirizine (ZYRTEC) 10 MG tablet; Take 1 tablet (10 mg total) by mouth daily.    Gynecomastia, bilateral  Advise weight reduction healthy lifestyle modification  Reviewed the mammogram that was done in 2012    Atypical chest pain  -     Electrocardiogram Perform - Clinic  -     NM MPI with Lexiscan; Future  I personally reviewed the EKG.  He has normal sinus rhythm with a ventricular rate of 69    Snores  -     Ambulatory referral to Sleep Medicine    Immunization Review  Adult Imm Review: No immunizations due today    I discussed the following with the patient:   Adult Healthy Living: Importance of regular exercise  Healthy nutrition  Getting adequate sleep  Stress management    Subjective:   Chief Complaint: Ilan Matthew is an 41 y.o. male here for a preventative health visit.    Patient has been advised of split billing requirements and indicates understanding: Yes  HPI:    Has hypertension and is not taking his medication regularly.  He has been taking it every other day instead.  Blood pressure is elevated today.    He has a tickle cough that is on and off for many years now.  Does not have reflux.  Does not have a history of asthma.  No formal history of smoking.  He does have allergies for which he takes Flonase.  He also is on lisinopril for hypertension    He has left breast no [place] Tia.  He had a mammogram of the left breast in 2012 which was essentially unremarkable.  In the recent months he has noted more pain along the nipple area.  He has also noted chest pain.  Specifics of chest pain is able to be not characterized by the patient.  He cannot recall if he has chest pain with activity or rest.  No radiation of this pain.    Healthy Habits  Are you taking a daily aspirin? No  Do you typically exercising at least 40 min, 3-4 times per week?  Yes  Do you usually eat at least 4 servings of fruit and vegetables a day, include whole grains and fiber and avoid regularly eating high fat foods? Yes  Have  "you had an eye exam in the past two years? Yes  Do you see a dentist twice per year? Yes  Do you have any concerns regarding sleep? No    Safety Screen  If you own firearms, are they secured in a locked gun cabinet or with trigger locks? The patient does not own any firearms  Do you feel you are safe where you are living?: Yes (8/29/2020 10:33 AM)  Do you feel you are safe in your relationship(s)?: Yes (8/29/2020 10:33 AM)      Review of Systems:  Please see above.  The rest of the review of systems are negative for all systems.     Cancer Screening     Patient has no health maintenance due at this time          Patient Care Team:  Jimy Grace MD as PCP - General (Family Medicine)  Jimy Grace MD as Assigned PCP        History     Reviewed By Date/Time Sections Reviewed    Sammie Martinez CMA 6/3/2021  1:37 PM Tobacco            Objective:   Vital Signs:   Visit Vitals  BP (!) 146/102 (Patient Site: Left Arm, Patient Position: Sitting, Cuff Size: Adult Large)   Pulse 73   Ht 5' 6.5\" (1.689 m)   Wt (!) 335 lb (152 kg)   SpO2 99%   BMI 53.26 kg/m           PHYSICAL EXAM    Objective:    Physical Exam   Vitals:    06/03/21 1337   BP: (!) 146/102   Pulse: 73   SpO2: 99%      Constitutional: Patient is oriented to person, place, and time. Patient appears well-developed and well-nourished. No distress.   Head: Normocephalic and atraumatic.   Right Ear: External ear normal. Normal TM  Left Ear: External ear normal. Normal TM  Eyes: Conjunctivae and EOM are normal. Pupils are equal, round, and reactive to light. Right eye exhibits no discharge. Left eye exhibits no discharge. No scleral icterus.   Neck: Neck supple. No JVD present. No tracheal deviation present. No thyromegaly present.   Cardiovascular: Normal rate, regular rhythm, normal heart sounds and intact distal pulses. No murmur heard.   Pulmonary/Chest: Effort normal and breath sounds normal. No stridor. No respiratory " distress. Patient has no wheezes, no rales, exhibits no tenderness.   Abdominal: Soft. Patient exhibits no distension and no mass. There is no tenderness. There is no rebound and no guarding.   Lymphadenopathy:  Patient has no cervical adenopathy.   Neurological: Patient is alert and oriented to person, place, and time. No cranial nerve deficit. Coordination normal.   Skin: Skin is warm and dry. No rash noted. Patient is not diaphoretic. No erythema. No pallor.   Enlarged breast tissue, bilaterally.  No palpable nodule or mass    The 10-year ASCVD risk score ([place] DC Jr., et al., 2013) is: 7.7%    Values used to calculate the score:      Age: 41 years      Sex: Male      Is Non- : Yes      Diabetic: No      Tobacco smoker: No      Systolic Blood Pressure: 146 mmHg      Is BP treated: Yes      HDL Cholesterol: 39 mg/dL      Total Cholesterol: 149 mg/dL         Medication List          Accurate as of Dolores 3, 2021  2:15 PM. If you have any questions, ask your nurse or doctor.            START taking these medications    cetirizine 10 MG tablet  Also known as: ZyrTEC  INSTRUCTIONS: Take 1 tablet (10 mg total) by mouth daily.  Started by: Jimy Oquendo MD        hydroCHLOROthiazide 25 MG tablet  Also known as: HYDRODIURIL  INSTRUCTIONS: Take 1 tablet (25 mg total) by mouth daily.  Replaces: hydroCHLOROthiazide 12.5 mg capsule  Started by: Jimy Oquendo MD           CHANGE how you take these medications    codeine-guaiFENesin  mg/5 mL liquid  Also known as: GUAIFENESIN AC  INSTRUCTIONS: Take 10 mL by mouth 3 (three) times a day as needed for cough.  What changed: how much to take  Changed by: Jimy Oquendo MD           CONTINUE taking these medications    clotrimazole-betamethasone cream  Also known as: LOTRISONE  INSTRUCTIONS: Apply topically 2 (two) times a day.  Reason for med: KNOWN/SUSPECTED INFECTION        fluticasone propionate 50  mcg/actuation nasal spray  Also known as: FLONASE  INSTRUCTIONS: Apply 1 spray into each nostril daily.        MULTI FOR HIM ORAL  INSTRUCTIONS: Take by mouth.        simvastatin 20 MG tablet  Also known as: ZOCOR  INSTRUCTIONS: Take 1 tablet (20 mg total) by mouth every evening.           STOP taking these medications    Ciprodex otic suspension  Generic drug: ciprofloxacin-dexamethasone  Stopped by: Jimy Oquendo MD     hydroCHLOROthiazide 12.5 mg capsule  Also known as: MICROZIDE  Replaced by: hydroCHLOROthiazide 25 MG tablet  Stopped by: Jimy Oquendo MD     lisinopriL 20 MG tablet  Also known as: PRINIVIL,ZESTRIL  Stopped by: Jimy Oquendo MD     loratadine-pseudoephedrine 5-120 mg Tb12  Also known as: Claritin-D 12 Hour  Stopped by: Jimy Oquendo MD           Where to Get Your Medications      These medications were sent to [address] [address]    Phone: [phone]     cetirizine 10 MG tablet    codeine-guaiFENesin  mg/5 mL liquid    fluticasone propionate 50 mcg/actuation nasal spray    hydroCHLOROthiazide 25 MG tablet    simvastatin 20 MG tablet         Additional Screenings Completed Today:

## 2021-07-04 NOTE — TELEPHONE ENCOUNTER
Telephone Encounter by Paris Ma at 7/2/2021 10:49 AM     Author: Paris Ma Service: -- Author Type: Patient Access    Filed: 7/2/2021 10:51 AM Encounter Date: 7/2/2021 Status: Signed    : Paris Ma (Patient Access)       Reason for Call:  Patient calling regarding his losartan that was recently prescribed and states that this medication is working for his BP and he has not noticed any side effects.      Phone Number Patient can be reached at:   Cell number on file:    Telephone Information:   Mobile [phone]       Can we leave a detailed message on this number?: No call back needed    Call taken on 7/2/2021 at 10:49 AM by Paris Ma

## 2021-07-06 VITALS
BODY MASS INDEX: 49.44 KG/M2 | DIASTOLIC BLOOD PRESSURE: 81 MMHG | WEIGHT: 315 LBS | HEIGHT: 67 IN | HEART RATE: 77 BPM | SYSTOLIC BLOOD PRESSURE: 151 MMHG

## 2021-07-06 VITALS
HEIGHT: 67 IN | OXYGEN SATURATION: 99 % | DIASTOLIC BLOOD PRESSURE: 80 MMHG | SYSTOLIC BLOOD PRESSURE: 138 MMHG | HEART RATE: 73 BPM | BODY MASS INDEX: 49.44 KG/M2 | WEIGHT: 315 LBS

## 2021-07-06 ASSESSMENT — PATIENT HEALTH QUESTIONNAIRE - PHQ9: SUM OF ALL RESPONSES TO PHQ QUESTIONS 1-9: 0

## 2021-07-07 NOTE — PROGRESS NOTES
ASSESSMENT/PLAN:  Ilan was seen today for follow-up.    Diagnoses and all orders for this visit:    Essential hypertension  -     hydroCHLOROthiazide (HYDRODIURIL) 25 MG tablet; Take 1 tablet (25 mg total) by mouth daily.  -     losartan (COZAAR) 25 MG tablet; Take 1 tablet (25 mg total) by mouth daily.  -     Comprehensive Metabolic Panel  Add losartan 25 mg  Stress test still yet to be scheduled  Follow-up in 1 month    Dyslipidemia  -     simvastatin (ZOCOR) 20 MG tablet; Take 1 tablet (20 mg total) by mouth every evening.  -     Lipid Cascade    Screening for prostate cancer  -     PSA (Prostatic-Specific Antigen), Annual Screen    Morbid obesity (H)  Counseled healthy lifestyle modifications    SUBJECTIVE:    Ilan Matthew is a 41 y.o. male who came in today     Here for blood pressure check.  He was started on chlorthalidone.  The dose was increased to 25 mg and lisinopril was discontinued because of the cough.  His cough has gotten better since discontinuation of lisinopril.  Also taking simvastatin.    Review of Systems (except those mentioned above)  Constitutional: Negative.   HENT: Negative.   Eyes: Negative.   Respiratory: Negative.   Cardiovascular: Negative.   Gastrointestinal: Negative.   Endocrine: Negative.   Genitourinary: Negative.   Musculoskeletal: Negative.   Skin: Negative.   Allergic/Immunologic: Negative.   Neurological: Negative.   Hematological: Negative.   Psychiatric/Behavioral: Negative.     Patient Active Problem List    Diagnosis Date Noted     Essential hypertension 04/03/2019     Morbid obesity (H) 04/03/2019     Hypertrophy Of Breast      Allergies   Allergen Reactions     Fish Oil [Omega-3 Fatty Acids] Other (See Comments)     Swollen tongue     Current Outpatient Medications   Medication Sig Dispense Refill     cetirizine (ZYRTEC) 10 MG tablet Take 1 tablet (10 mg total) by mouth daily. 90 tablet 3     clotrimazole-betamethasone (LOTRISONE) cream Apply topically 2 (two) times  a day. 45 g 0     codeine-guaiFENesin (GUAIFENESIN AC)  mg/5 mL liquid Take 10 mL by mouth 3 (three) times a day as needed for cough. 473 mL 0     fluticasone propionate (FLONASE) 50 mcg/actuation nasal spray Apply 1 spray into each nostril daily. 16 g 12     hydroCHLOROthiazide (HYDRODIURIL) 25 MG tablet Take 1 tablet (25 mg total) by mouth daily. 90 tablet 0     Multivits/Iron Fum/FA/D3/Lycop (MULTI FOR HIM ORAL) Take by mouth.       simvastatin (ZOCOR) 20 MG tablet Take 1 tablet (20 mg total) by mouth every evening. 90 tablet 0     losartan (COZAAR) 25 MG tablet Take 1 tablet (25 mg total) by mouth daily. 90 tablet 0     No current facility-administered medications for this visit.      No past medical history on file.  No past surgical history on file.  Social History     Socioeconomic History     Marital status:      Spouse name: None     Number of children: None     Years of education: None     Highest education level: None   Occupational History     Occupation: health care   Social Needs     Financial resource strain: None     Food insecurity     Worry: None     Inability: None     Transportation needs     Medical: None     Non-medical: None   Tobacco Use     Smoking status: Never Smoker     Smokeless tobacco: Never Used   Substance and Sexual Activity     Alcohol use: None     Drug use: None     Sexual activity: None   Lifestyle     Physical activity     Days per week: None     Minutes per session: None     Stress: None   Relationships     Social connections     Talks on phone: None     Gets together: None     Attends Ep[employer] service: None     Active member of club or organization: None     Attends meetings of clubs or organizations: None     Relationship status: None     Intimate partner violence     Fear of current or ex partner: None     Emotionally abused: None     Physically abused: None     Forced sexual activity: None   Other Topics Concern     None   Social History Narrative     None  "    Family History   Problem Relation Age of Onset     Hypertension Mother          OBJECTIVE:    Vitals:    06/29/21 0829 06/29/21 0830   BP: 145/84 151/81   Patient Site: Left Arm Right Arm   Patient Position: Sitting Sitting   Cuff Size: Adult Large Adult Large   Pulse: 77    Weight: (!) 336 lb 9.6 oz (152.7 kg)    Height: 5' 6.5\" (1.689 m)      Body mass index is 53.51 kg/m .    Physical Exam:  Constitutional: Patient was oriented to person, place, and time. Patient appeared well-developed and well-nourished. No distress.   Head: Normocephalic and atraumatic.   Right Ear: External ear normal.   Left Ear: External ear normal.   Mouth/Throat: Oropharynx was clear and moist. No oropharyngeal exudate.   Eyes: Conjunctivae and EOM were normal. Right eye exhibited no discharge. Left eye exhibited no discharge. No scleral icterus.   Neck: Neck supple. No JVD present. No tracheal deviation present. No thyromegaly present.   Lymphadenopathy:  Patient has no cervical adenopathy.   Cardiovascular: Normal rate, regular rhythm, normal heart sounds and intact distal pulses. No murmur heard.   Pulmonary/Chest: Effort normal and breath sounds normal. No stridor. No respiratory distress. Patient had no wheezes, no rales, exhibits no tenderness.   Skin: Skin was warm and dry. No rash noted. Patient was not diaphoretic. No erythema. No pallor.       Results for orders placed or performed in visit on 06/03/21   Electrocardiogram Perform - Clinic   Result Value Ref Range    SYSTOLIC BLOOD PRESSURE      DIASTOLIC BLOOD PRESSURE      VENTRICULAR RATE 69 BPM    ATRIAL RATE 69 BPM    P-R INTERVAL 142 ms    QRS DURATION 90 ms    Q-T INTERVAL 382 ms    QTC CALCULATION (BEZET) 409 ms    P Axis 30 degrees    R AXIS -65 degrees    T AXIS -3 degrees    MUSE DIAGNOSIS       Normal sinus rhythm  Left axis deviation  Possible Lateral infarct , age undetermined  Inferior infarct , age undetermined  Abnormal ECG  No previous ECGs " available  Confirmed by TERESITA LINCOLN, JOEL LOC:WW ([id number]) on 6/3/2021 2:48:22 PM

## 2021-07-08 ASSESSMENT — ANXIETY QUESTIONNAIRES: GAD7 TOTAL SCORE: 0

## 2021-09-21 ENCOUNTER — OFFICE VISIT (OUTPATIENT)
Dept: FAMILY MEDICINE | Facility: CLINIC | Age: 41
End: 2021-09-21
Payer: COMMERCIAL

## 2021-09-21 VITALS
WEIGHT: 315 LBS | SYSTOLIC BLOOD PRESSURE: 132 MMHG | OXYGEN SATURATION: 97 % | BODY MASS INDEX: 54.91 KG/M2 | DIASTOLIC BLOOD PRESSURE: 86 MMHG | HEART RATE: 83 BPM

## 2021-09-21 DIAGNOSIS — E66.813 CLASS 3 SEVERE OBESITY WITH SERIOUS COMORBIDITY AND BODY MASS INDEX (BMI) OF 50.0 TO 59.9 IN ADULT, UNSPECIFIED OBESITY TYPE (H): ICD-10-CM

## 2021-09-21 DIAGNOSIS — E66.01 CLASS 3 SEVERE OBESITY WITH SERIOUS COMORBIDITY AND BODY MASS INDEX (BMI) OF 50.0 TO 59.9 IN ADULT, UNSPECIFIED OBESITY TYPE (H): ICD-10-CM

## 2021-09-21 DIAGNOSIS — I10 ESSENTIAL HYPERTENSION: Primary | ICD-10-CM

## 2021-09-21 DIAGNOSIS — R73.03 PREDIABETES: ICD-10-CM

## 2021-09-21 PROCEDURE — 99214 OFFICE O/P EST MOD 30 MIN: CPT | Performed by: FAMILY MEDICINE

## 2021-09-21 RX ORDER — FLUTICASONE PROPIONATE 50 MCG
1 SPRAY, SUSPENSION (ML) NASAL
COMMUNITY
Start: 2021-06-03 | End: 2022-01-06

## 2021-09-21 RX ORDER — CETIRIZINE HYDROCHLORIDE 10 MG/1
10 TABLET ORAL
COMMUNITY
Start: 2021-06-03 | End: 2022-01-06

## 2021-09-21 RX ORDER — HYDROCHLOROTHIAZIDE 25 MG/1
25 TABLET ORAL DAILY
Qty: 90 TABLET | Refills: 1 | Status: SHIPPED | DISCHARGE
Start: 2021-09-21 | End: 2021-09-21

## 2021-09-21 RX ORDER — HYDROCHLOROTHIAZIDE 25 MG/1
25 TABLET ORAL
COMMUNITY
Start: 2021-06-29 | End: 2021-09-21

## 2021-09-21 RX ORDER — LOSARTAN POTASSIUM 25 MG/1
25 TABLET ORAL
COMMUNITY
Start: 2021-06-29 | End: 2021-09-21

## 2021-09-21 RX ORDER — SIMVASTATIN 20 MG
20 TABLET ORAL
COMMUNITY
Start: 2021-06-29 | End: 2022-01-06

## 2021-09-21 RX ORDER — HYDROCHLOROTHIAZIDE 25 MG/1
25 TABLET ORAL DAILY
Qty: 90 TABLET | Refills: 1 | Status: SHIPPED | OUTPATIENT
Start: 2021-09-21 | End: 2022-01-06

## 2021-09-21 RX ORDER — LOSARTAN POTASSIUM 25 MG/1
25 TABLET ORAL 2 TIMES DAILY
Qty: 180 TABLET | Refills: 1 | Status: SHIPPED | OUTPATIENT
Start: 2021-09-21 | End: 2021-09-21

## 2021-09-21 RX ORDER — LOSARTAN POTASSIUM 25 MG/1
25 TABLET ORAL 2 TIMES DAILY
Qty: 180 TABLET | Refills: 1 | Status: SHIPPED | OUTPATIENT
Start: 2021-09-21 | End: 2022-01-06

## 2021-09-21 NOTE — PROGRESS NOTES
ASSESSMENT/PLAN:  Ilan was seen today for blood pressure check.    Diagnoses and all orders for this visit:    Essential hypertension  Increase losartan to 25 mg twice daily.  Continue with hydrochlorothiazide 25 mg.  We spoke about complications of uncontrolled hypertension.  We spoke about healthy lifestyle modifications.  Follow-up in 1 month   -     hydrochlorothiazide (HYDRODIURIL) 25 MG tablet; Take 1 tablet (25 mg) by mouth daily  -     losartan (COZAAR) 25 MG tablet; Take 1 tablet (25 mg) by mouth 2 times daily    Prediabetes  Add Ozempic for sugar control and weight  -     semaglutide (OZEMPIC) 2 MG/1.5ML SOPN pen; Inject 0.25 mg Subcutaneous once a week    Class 3 severe obesity with serious comorbidity and body mass index (BMI) of 50.0 to 59.9 in adult, unspecified obesity type (H)  Start Ozempic for weight management  Counseled healthy lifestyle modifications  -     semaglutide (OZEMPIC) 2 MG/1.5ML SOPN pen; Inject 0.25 mg Subcutaneous once a week    Insomnia  The difficulty is that he works double shifts which includes the midnight shift into the first shift.  This affects his sleeping pattern and eating pattern.  He will try melatonin.  He may continue with Benadryl for now  Work on sleep hygiene    SUBJECTIVE:    Ilan Matthew is a 41 year old male who came in today     Here for med check  Losartan 25 mg was added to hydrochlorothiazide 25 mg.  He is tolerating the medication well.  Still has elevated blood pressure when he checks his blood pressure at work.  Still has occasional headaches associated with elevated blood pressure.    Lab results showed prediabetes.  He is on simvastatin with adequate lipid profile.    Weight is up from his last visit.  Current weight of 345 pounds with a body mass index of 54    Review of Systems (except those mentioned above)  Constitutional: Negative.   HENT: Negative.   Eyes: Negative.   Respiratory: Negative.   Cardiovascular: Negative.   Gastrointestinal:  Negative.   Endocrine: Negative.   Genitourinary: Negative.   Musculoskeletal: Negative.   Skin: Negative.   Allergic/Immunologic: Negative.   Neurological: Negative.   Hematological: Negative.   Psychiatric/Behavioral: Negative.     Patient Active Problem List    Diagnosis Date Noted     Essential hypertension 04/03/2019     Priority: Medium     Morbid obesity (H) 04/03/2019     Priority: Medium     Hypertrophy Of Breast      Priority: Medium     Created by Conversion         Allergies   Allergen Reactions     Fish Oil [Omega-3 Fatty Acids] Other (See Comments)     Swollen tongue     Current Outpatient Medications   Medication Sig Dispense Refill     cetirizine (ZYRTEC) 10 MG tablet Take 10 mg by mouth       fluticasone (FLONASE) 50 MCG/ACT nasal spray 1 spray       hydrochlorothiazide (HYDRODIURIL) 25 MG tablet Take 1 tablet (25 mg) by mouth daily 90 tablet 1     losartan (COZAAR) 25 MG tablet Take 1 tablet (25 mg) by mouth 2 times daily 180 tablet 1     Multiple Vitamin (MULTIVITAMIN PO)        semaglutide (OZEMPIC) 2 MG/1.5ML SOPN pen Inject 0.25 mg Subcutaneous once a week 1.5 mL 0     simvastatin (ZOCOR) 20 MG tablet Take 20 mg by mouth       No past medical history on file.  No past surgical history on file.  Social History     Socioeconomic History     Marital status:      Spouse name: None     Number of children: None     Years of education: None     Highest education level: None   Occupational History     None   Tobacco Use     Smoking status: Never Smoker     Smokeless tobacco: Never Used   Substance and Sexual Activity     Alcohol use: None     Drug use: None     Sexual activity: None   Other Topics Concern     None   Social History Narrative     None     Social Determinants of Health     Financial Resource Strain:      Difficulty of Paying Living Expenses:    Food Insecurity:      Worried About Running Out of Food in the Last Year:      Ran Out of Food in the Last Year:    Transportation Needs:       Lack of Transportation (Medical):      Lack of Transportation (Non-Medical):    Physical Activity:      Days of Exercise per Week:      Minutes of Exercise per Session:    Stress:      Feeling of Stress :    Social Connections:      Frequency of Communication with Friends and Family:      Frequency of Social Gatherings with Friends and Family:      Attends Judaism Services:      Active Member of Clubs or Organizations:      Attends Club or Organization Meetings:      Marital Status:    Intimate Partner Violence:      Fear of Current or Ex-Partner:      Emotionally Abused:      Physically Abused:      Sexually Abused:      Family History   Problem Relation Age of Onset     Hypertension Mother          OBJECTIVE:    Vitals:    09/21/21 0933 09/21/21 1001   BP: (!) 134/90 132/86   BP Location: Right arm Right arm   Patient Position: Sitting Sitting   Cuff Size: Adult Large Adult Large   Pulse: 83    SpO2: 97%    Weight: (!) 156.7 kg (345 lb 6.4 oz)      Body mass index is 54.91 kg/m .    Physical Exam:  Constitutional: Patient is oriented to person, place, and time. Patient appears well-developed and well-nourished. No distress.   Head: Normocephalic and atraumatic.   Right Ear: External ear normal.   Left Ear: External ear normal.   Eyes: Conjunctivae and EOM are normal. Right eye exhibits no discharge. Left eye exhibits no discharge. No scleral icterus.   Neurological: Patient is alert and oriented to person, place, and time.  Skin: No rash noted. Patient is not diaphoretic. No erythema. No pallor.

## 2021-09-22 RX ORDER — NALTREXONE HYDROCHLORIDE 50 MG/1
TABLET, FILM COATED ORAL
Qty: 45 TABLET | Refills: 0 | Status: SHIPPED | OUTPATIENT
Start: 2021-09-22 | End: 2022-01-06

## 2021-09-22 RX ORDER — BUPROPION HYDROCHLORIDE 100 MG/1
100 TABLET, EXTENDED RELEASE ORAL 2 TIMES DAILY
Qty: 180 TABLET | Refills: 0 | Status: SHIPPED | OUTPATIENT
Start: 2021-09-22 | End: 2021-12-15

## 2021-10-10 ENCOUNTER — HEALTH MAINTENANCE LETTER (OUTPATIENT)
Age: 41
End: 2021-10-10

## 2021-10-26 ENCOUNTER — OFFICE VISIT (OUTPATIENT)
Dept: FAMILY MEDICINE | Facility: CLINIC | Age: 41
End: 2021-10-26
Payer: COMMERCIAL

## 2021-10-26 VITALS
OXYGEN SATURATION: 98 % | DIASTOLIC BLOOD PRESSURE: 70 MMHG | WEIGHT: 315 LBS | HEART RATE: 76 BPM | SYSTOLIC BLOOD PRESSURE: 134 MMHG | BODY MASS INDEX: 51.11 KG/M2

## 2021-10-26 DIAGNOSIS — I10 ESSENTIAL HYPERTENSION: Primary | ICD-10-CM

## 2021-10-26 DIAGNOSIS — E66.01 MORBID OBESITY (H): ICD-10-CM

## 2021-10-26 PROCEDURE — 99213 OFFICE O/P EST LOW 20 MIN: CPT | Performed by: FAMILY MEDICINE

## 2021-10-26 NOTE — PROGRESS NOTES
ASSESSMENT/PLAN:  Ilan was seen today for recheck.    Diagnoses and all orders for this visit:    Essential hypertension  Continue with hydrochlorothiazide 25 mg and losartan 25 mg twice daily  Follow-up in 6 months    Morbid obesity (H)  He has lost 25 pounds with intermittent fasting  Counseled healthy lifestyle modifications  He wants to hold on pharmacotherapy at this time.  Ozempic was not covered by his insurance.  He has not taking Wellbutrin/naltrexone    Adjustment, anxiety, depression  Will assist with time off from work  Motivational interviewing was utilized today.  Modified cognitive behavioral therapy was performed with counseling on internal locus of control.  Discussed importance of self care, sleep, nutrition, hydration, exercise, and mindfulness      SUBJECTIVE:    Ilan Matthew is a 41 year old male who came in today     Here for BP check  Blood pressures under good control with hydrochlorothiazide 25 mg and losartan 25 mg twice daily.  He no longer has headache.  He does not check his blood pressures at home.    He has lost about 25 pounds with intermittent fasting.  He has not taking naltrexone or Wellbutrin.  Ozempic was not covered by his insurance.    He stated that he and his wife are .  He had a hard time coping with this initially but has come to terms with the decision made by his wife.    Review of Systems (except those mentioned above)  Constitutional: Negative.   HENT: Negative.   Eyes: Negative.   Respiratory: Negative.   Cardiovascular: Negative.   Gastrointestinal: Negative.   Endocrine: Negative.   Genitourinary: Negative.   Musculoskeletal: Negative.   Skin: Negative.   Allergic/Immunologic: Negative.   Neurological: Negative.   Hematological: Negative.   Psychiatric/Behavioral: Negative.     Patient Active Problem List    Diagnosis Date Noted     Essential hypertension 04/03/2019     Priority: Medium     Morbid obesity (H) 04/03/2019     Priority: Medium      Hypertrophy Of Breast      Priority: Medium     Created by Conversion         Allergies   Allergen Reactions     Fish Oil [Omega-3 Fatty Acids] Other (See Comments)     Swollen tongue     Current Outpatient Medications   Medication Sig Dispense Refill     cetirizine (ZYRTEC) 10 MG tablet Take 10 mg by mouth       fluticasone (FLONASE) 50 MCG/ACT nasal spray 1 spray       hydrochlorothiazide (HYDRODIURIL) 25 MG tablet Take 1 tablet (25 mg) by mouth daily 90 tablet 1     losartan (COZAAR) 25 MG tablet Take 1 tablet (25 mg) by mouth 2 times daily 180 tablet 1     Multiple Vitamin (MULTIVITAMIN PO)        simvastatin (ZOCOR) 20 MG tablet Take 20 mg by mouth       buPROPion (WELLBUTRIN SR) 100 MG 12 hr tablet Take 1 tablet (100 mg) by mouth 2 times daily (Patient not taking: Reported on 10/26/2021) 180 tablet 0     naltrexone (DEPADE/REVIA) 50 MG tablet Take 1/2 tab twice daily (Patient not taking: Reported on 10/26/2021) 45 tablet 0     No past medical history on file.  No past surgical history on file.  Social History     Socioeconomic History     Marital status:      Spouse name: None     Number of children: None     Years of education: None     Highest education level: None   Occupational History     None   Tobacco Use     Smoking status: Never Smoker     Smokeless tobacco: Never Used   Substance and Sexual Activity     Alcohol use: None     Drug use: None     Sexual activity: None   Other Topics Concern     None   Social History Narrative     None     Social Determinants of Health     Financial Resource Strain:      Difficulty of Paying Living Expenses:    Food Insecurity:      Worried About Running Out of Food in the Last Year:      Ran Out of Food in the Last Year:    Transportation Needs:      Lack of Transportation (Medical):      Lack of Transportation (Non-Medical):    Physical Activity:      Days of Exercise per Week:      Minutes of Exercise per Session:    Stress:      Feeling of Stress :    Social  Connections:      Frequency of Communication with Friends and Family:      Frequency of Social Gatherings with Friends and Family:      Attends [employer] Services:      Active Member of Clubs or Organizations:      Attends Club or Organization Meetings:      Marital Status:    Intimate Partner Violence:      Fear of Current or Ex-Partner:      Emotionally Abused:      Physically Abused:      Sexually Abused:      Family History   Problem Relation Age of Onset     Hypertension Mother          OBJECTIVE:    Vitals:    10/26/21 1511   BP: 134/70   BP Location: Left arm   Patient Position: Sitting   Cuff Size: Thigh   Pulse: 76   SpO2: 98%   Weight: 145.8 kg (321 lb 8 oz)     Body mass index is 51.11 kg/m .    Physical Exam:  Constitutional: Patient is oriented to person, place, and time. Patient appears well-developed and well-nourished. No distress.   Head: Normocephalic and atraumatic.   Right Ear: External ear normal.   Left Ear: External ear normal.   Eyes: Conjunctivae and EOM are normal. Right eye exhibits no discharge. Left eye exhibits no discharge. No scleral icterus.   Neurological: Patient is alert and oriented to person, place, and time.  Skin: No rash noted. Patient is not diaphoretic. No erythema. No pallor.

## 2021-11-10 ENCOUNTER — MYC MEDICAL ADVICE (OUTPATIENT)
Dept: FAMILY MEDICINE | Facility: CLINIC | Age: 41
End: 2021-11-10
Payer: COMMERCIAL

## 2021-11-10 DIAGNOSIS — F33.0 MILD EPISODE OF RECURRENT MAJOR DEPRESSIVE DISORDER (H): Primary | ICD-10-CM

## 2021-11-10 RX ORDER — BUPROPION HYDROCHLORIDE 150 MG/1
TABLET, EXTENDED RELEASE ORAL
Qty: 60 TABLET | Refills: 0 | Status: SHIPPED | OUTPATIENT
Start: 2021-11-10 | End: 2022-01-06

## 2021-11-11 ENCOUNTER — MYC MEDICAL ADVICE (OUTPATIENT)
Dept: FAMILY MEDICINE | Facility: CLINIC | Age: 41
End: 2021-11-11
Payer: COMMERCIAL

## 2021-11-17 ENCOUNTER — MYC MEDICAL ADVICE (OUTPATIENT)
Dept: FAMILY MEDICINE | Facility: CLINIC | Age: 41
End: 2021-11-17
Payer: COMMERCIAL

## 2021-11-29 ENCOUNTER — TELEPHONE (OUTPATIENT)
Dept: FAMILY MEDICINE | Facility: CLINIC | Age: 41
End: 2021-11-29
Payer: COMMERCIAL

## 2021-12-10 ENCOUNTER — TELEPHONE (OUTPATIENT)
Dept: FAMILY MEDICINE | Facility: CLINIC | Age: 41
End: 2021-12-10
Payer: COMMERCIAL

## 2021-12-10 NOTE — TELEPHONE ENCOUNTER
12-10-21  Reason for Call:  pt providing 2dn and Booster covid vaccine info  Detailed comments: pt called and wants to provide his 2nd & Booster vaccine info so we can update in his file  Pt received his 2nd dose: 1-25-21 LOT# [id number]  Pt received his booster: 9-30-21 LOT# [id number]  [employer] vaccine    Phone Number Patient can be reached at: Cell number on file:    Telephone Information:   Mobile [phone]       Best Time: antyime    Can we leave a detailed message on this number? YES    Call taken on 12/10/2021 at 3:11 PM by Indiana Healy

## 2021-12-15 ENCOUNTER — OFFICE VISIT (OUTPATIENT)
Dept: FAMILY MEDICINE | Facility: CLINIC | Age: 41
End: 2021-12-15
Payer: COMMERCIAL

## 2021-12-15 VITALS
OXYGEN SATURATION: 98 % | WEIGHT: 315 LBS | SYSTOLIC BLOOD PRESSURE: 136 MMHG | HEART RATE: 78 BPM | DIASTOLIC BLOOD PRESSURE: 80 MMHG | BODY MASS INDEX: 50.08 KG/M2

## 2021-12-15 DIAGNOSIS — F43.22 ADJUSTMENT DISORDER WITH ANXIOUS MOOD: Primary | ICD-10-CM

## 2021-12-15 PROCEDURE — 99214 OFFICE O/P EST MOD 30 MIN: CPT | Performed by: FAMILY MEDICINE

## 2021-12-15 ASSESSMENT — ANXIETY QUESTIONNAIRES
2. NOT BEING ABLE TO STOP OR CONTROL WORRYING: SEVERAL DAYS
3. WORRYING TOO MUCH ABOUT DIFFERENT THINGS: SEVERAL DAYS
7. FEELING AFRAID AS IF SOMETHING AWFUL MIGHT HAPPEN: NOT AT ALL
4. TROUBLE RELAXING: MORE THAN HALF THE DAYS
GAD7 TOTAL SCORE: 4
IF YOU CHECKED OFF ANY PROBLEMS ON THIS QUESTIONNAIRE, HOW DIFFICULT HAVE THESE PROBLEMS MADE IT FOR YOU TO DO YOUR WORK, TAKE CARE OF THINGS AT HOME, OR GET ALONG WITH OTHER PEOPLE: NOT DIFFICULT AT ALL
6. BECOMING EASILY ANNOYED OR IRRITABLE: NOT AT ALL
1. FEELING NERVOUS, ANXIOUS, OR ON EDGE: NOT AT ALL
5. BEING SO RESTLESS THAT IT IS HARD TO SIT STILL: NOT AT ALL

## 2021-12-15 ASSESSMENT — PATIENT HEALTH QUESTIONNAIRE - PHQ9: SUM OF ALL RESPONSES TO PHQ QUESTIONS 1-9: 11

## 2021-12-15 NOTE — PROGRESS NOTES
ASSESSMENT/PLAN:  Ilan was seen today for paperwork.    Diagnoses and all orders for this visit:    Adjustment disorder with anxious mood  Advised to take the medication (wellbutrin)  Advised to schedule appt with therapist  Motivational interviewing was utilized today.  Modified cognitive behavioral therapy was performed with counseling on internal locus of control.  Discussed importance of self care, sleep, nutrition, hydration, exercise, and mindfulness  Follow-up in 1 month    SUBJECTIVE:    Ilan Matthew is a 41 year old male who came in today     Here for follow-up.  He has not taking Wellbutrin yet.  He has not schedule an appointment with therapist yet.  They had their first court appearance last week and did not reach a decision.  Next week they will be undergoing a mini trial to make some decisions about who will stay in the house.    Will be traveling with the kids to see family out of state.    Unable to stop worrying  Worrying too much different things  Trouble relaxing    Lack of interest/pleasure  Feeling down, depressed, hopeless  Trouble with sleep  Endorsing fatigue  Feels bad about self  No suicidal ideations  Feels safe    PHQ9=11, GAD7=4    Review of Systems (except those mentioned above)  Constitutional: Negative.   HENT: Negative.   Eyes: Negative.   Respiratory: Negative.   Cardiovascular: Negative.   Gastrointestinal: Negative.   Endocrine: Negative.   Genitourinary: Negative.   Musculoskeletal: Negative.   Skin: Negative.   Allergic/Immunologic: Negative.   Neurological: Negative.   Hematological: Negative.   Psychiatric/Behavioral: Negative.     Patient Active Problem List    Diagnosis Date Noted     Essential hypertension 04/03/2019     Priority: Medium     Morbid obesity (H) 04/03/2019     Priority: Medium     Hypertrophy Of Breast      Priority: Medium     Created by Conversion         Allergies   Allergen Reactions     Fish Oil [Omega-3 Fatty Acids] Other (See Comments)     Swollen  tongue     Current Outpatient Medications   Medication Sig Dispense Refill     cetirizine (ZYRTEC) 10 MG tablet Take 10 mg by mouth       fluticasone (FLONASE) 50 MCG/ACT nasal spray 1 spray       hydrochlorothiazide (HYDRODIURIL) 25 MG tablet Take 1 tablet (25 mg) by mouth daily 90 tablet 1     losartan (COZAAR) 25 MG tablet Take 1 tablet (25 mg) by mouth 2 times daily 180 tablet 1     Multiple Vitamin (MULTIVITAMIN PO)        naltrexone (DEPADE/REVIA) 50 MG tablet Take 1/2 tab twice daily 45 tablet 0     simvastatin (ZOCOR) 20 MG tablet Take 20 mg by mouth       buPROPion (WELLBUTRIN SR) 150 MG 12 hr tablet Take 1 tab po daily x 7 days then increase to BID thereafter (Patient not taking: Reported on 12/15/2021) 60 tablet 0     No past medical history on file.  No past surgical history on file.  Social History     Socioeconomic History     Marital status:      Spouse name: None     Number of children: None     Years of education: None     Highest education level: None   Occupational History     None   Tobacco Use     Smoking status: Never Smoker     Smokeless tobacco: Never Used   Substance and Sexual Activity     Alcohol use: None     Drug use: None     Sexual activity: None   Other Topics Concern     None   Social History Narrative     None     Social Determinants of Health     Financial Resource Strain: Not on file   Food Insecurity: Not on file   Transportation Needs: Not on file   Physical Activity: Not on file   Stress: Not on file   Social Connections: Not on file   Intimate Partner Violence: Not on file   Housing Stability: Not on file     Family History   Problem Relation Age of Onset     Hypertension Mother          OBJECTIVE:    Vitals:    12/15/21 1545 12/15/21 1613   BP: (!) 140/80 136/80   BP Location: Left arm    Patient Position: Sitting    Cuff Size: Adult Large    Pulse: 78    SpO2: 98%    Weight: 142.9 kg (315 lb)      Body mass index is 50.08 kg/m .    Physical Exam:  Constitutional:  Patient is oriented to person, place, and time. Patient appears well-developed and well-nourished. No distress.   Head: Normocephalic and atraumatic.   Right Ear: External ear normal.   Left Ear: External ear normal.   Eyes: Conjunctivae and EOM are normal. Right eye exhibits no discharge. Left eye exhibits no discharge. No scleral icterus.   Neurological: Patient is alert and oriented to person, place, and time.  Skin: No rash noted. Patient is not diaphoretic. No erythema. No pallor.    The patient has good eye contact.  No psychomotor retardation or stereotypical behaviors.  Speech was regular rate, regular rhythm, adequate responses.  Mood was sad and affect was congruent mood.  No suicidal or homicidal intent.  No hallucination.

## 2021-12-28 ENCOUNTER — TELEPHONE (OUTPATIENT)
Dept: FAMILY MEDICINE | Facility: CLINIC | Age: 41
End: 2021-12-28
Payer: COMMERCIAL

## 2021-12-28 NOTE — TELEPHONE ENCOUNTER
LM. Short term disability paper. Dr Hewitt is out of office this week. Per Covering provider Pt needs to wait for Dr Hewitt to fill out this paper. Form is in Dr Hewitt's inbox.

## 2022-01-03 NOTE — TELEPHONE ENCOUNTER
[facility] paperwork has been faxed to [facility] at [phone].

## 2022-01-06 ENCOUNTER — OFFICE VISIT (OUTPATIENT)
Dept: FAMILY MEDICINE | Facility: CLINIC | Age: 42
End: 2022-01-06
Payer: COMMERCIAL

## 2022-01-06 VITALS
OXYGEN SATURATION: 99 % | WEIGHT: 315 LBS | HEART RATE: 80 BPM | SYSTOLIC BLOOD PRESSURE: 128 MMHG | BODY MASS INDEX: 50.24 KG/M2 | DIASTOLIC BLOOD PRESSURE: 72 MMHG

## 2022-01-06 DIAGNOSIS — F33.0 MILD EPISODE OF RECURRENT MAJOR DEPRESSIVE DISORDER (H): ICD-10-CM

## 2022-01-06 DIAGNOSIS — E66.01 MORBID OBESITY (H): ICD-10-CM

## 2022-01-06 DIAGNOSIS — I10 ESSENTIAL HYPERTENSION: ICD-10-CM

## 2022-01-06 DIAGNOSIS — F43.22 ADJUSTMENT DISORDER WITH ANXIOUS MOOD: Primary | ICD-10-CM

## 2022-01-06 PROCEDURE — 99213 OFFICE O/P EST LOW 20 MIN: CPT | Performed by: FAMILY MEDICINE

## 2022-01-06 RX ORDER — LOSARTAN POTASSIUM 25 MG/1
25 TABLET ORAL 2 TIMES DAILY
Qty: 180 TABLET | Refills: 3 | Status: SHIPPED | OUTPATIENT
Start: 2022-01-06 | End: 2022-06-16

## 2022-01-06 RX ORDER — HYDROCHLOROTHIAZIDE 25 MG/1
25 TABLET ORAL DAILY
Qty: 90 TABLET | Refills: 3 | Status: SHIPPED | OUTPATIENT
Start: 2022-01-06 | End: 2022-06-16

## 2022-01-06 RX ORDER — BUPROPION HYDROCHLORIDE 150 MG/1
TABLET, EXTENDED RELEASE ORAL
Qty: 180 TABLET | Refills: 0 | Status: SHIPPED | OUTPATIENT
Start: 2022-01-06 | End: 2022-06-16

## 2022-01-06 ASSESSMENT — ANXIETY QUESTIONNAIRES
GAD7 TOTAL SCORE: 1
4. TROUBLE RELAXING: NOT AT ALL
7. FEELING AFRAID AS IF SOMETHING AWFUL MIGHT HAPPEN: NOT AT ALL
3. WORRYING TOO MUCH ABOUT DIFFERENT THINGS: SEVERAL DAYS
6. BECOMING EASILY ANNOYED OR IRRITABLE: NOT AT ALL
5. BEING SO RESTLESS THAT IT IS HARD TO SIT STILL: NOT AT ALL
1. FEELING NERVOUS, ANXIOUS, OR ON EDGE: NOT AT ALL
2. NOT BEING ABLE TO STOP OR CONTROL WORRYING: NOT AT ALL

## 2022-01-06 NOTE — PROGRESS NOTES
"ASSESSMENT/PLAN:  Ilan was seen today for paperwork and cough med.    Diagnoses and all orders for this visit:    Adjustment disorder with anxious mood, major depression  -     buPROPion (WELLBUTRIN SR) 150 MG 12 hr tablet; Take 1 tab po  BID  PHQ9=0, GAD7=1  Doing better I think mostly because of his recent trip to see his family in [place]  Continue Wellbutrin  Motivational interviewing was utilized today.  Modified cognitive behavioral therapy was performed with counseling on internal locus of control.  Discussed importance of self care, sleep, nutrition, hydration, exercise, and mindfulness  Provided him with a note to return to work on January 16, 2022  Follow-up in 3 months    Essential hypertension  -     hydrochlorothiazide (HYDRODIURIL) 25 MG tablet; Take 1 tablet (25 mg) by mouth daily  -     losartan (COZAAR) 25 MG tablet; Take 1 tablet (25 mg) by mouth 2 times daily  Stable  Follow-up in 3 months    Morbid obesity (H)  Counseled healthy lifestyle modifications      SUBJECTIVE:    Ilan Matthew is a 41 year old male who came in today     Here for f/u  Just return from a trip visiting family in [place] earlier this week  Feeling well \"good to be away\"  Has not had a chance to schedule with therapist yet  Started wellbutrin tolerating the medication.  Overall he is doing better  PHQ-9 score of 0 and MICHELLE-7 score 1  He has been off work since December 15, 2021 through January 15, 2022.  He is anticipating going back to work in January 16, 2022.  He works as a psych associated in the inpatient hospital at [employer]  He has a court date for his divorce in January 22, 2022 to settle his current property.    Blood pressures under control with losartan and hydrochlorothiazide    He is working on weight management with healthy eating and that also with Wellbutrin.  He has not started naltrexone    Review of Systems (except those mentioned above)  Constitutional: Negative.   HENT: Negative.   Eyes: Negative. "   Respiratory: Negative.   Cardiovascular: Negative.   Gastrointestinal: Negative.   Endocrine: Negative.   Genitourinary: Negative.   Musculoskeletal: Negative.   Skin: Negative.   Allergic/Immunologic: Negative.   Neurological: Negative.   Hematological: Negative.   Psychiatric/Behavioral: Negative.     Patient Active Problem List    Diagnosis Date Noted     Essential hypertension 04/03/2019     Priority: Medium     Morbid obesity (H) 04/03/2019     Priority: Medium     Hypertrophy Of Breast      Priority: Medium     Created by Conversion         Allergies   Allergen Reactions     Fish Oil [Omega-3 Fatty Acids] Other (See Comments)     Swollen tongue     Current Outpatient Medications   Medication Sig Dispense Refill     buPROPion (WELLBUTRIN SR) 150 MG 12 hr tablet Take 1 tab po   tablet 0     hydrochlorothiazide (HYDRODIURIL) 25 MG tablet Take 1 tablet (25 mg) by mouth daily 90 tablet 3     losartan (COZAAR) 25 MG tablet Take 1 tablet (25 mg) by mouth 2 times daily 180 tablet 3     cetirizine (ZYRTEC) 10 MG tablet Take 10 mg by mouth       fluticasone (FLONASE) 50 MCG/ACT nasal spray 1 spray       Multiple Vitamin (MULTIVITAMIN PO)        naltrexone (DEPADE/REVIA) 50 MG tablet Take 1/2 tab twice daily 45 tablet 0     simvastatin (ZOCOR) 20 MG tablet Take 20 mg by mouth       No past medical history on file.  No past surgical history on file.  Social History     Socioeconomic History     Marital status:      Spouse name: None     Number of children: None     Years of education: None     Highest education level: None   Occupational History     None   Tobacco Use     Smoking status: Never Smoker     Smokeless tobacco: Never Used   Substance and Sexual Activity     Alcohol use: None     Drug use: None     Sexual activity: None   Other Topics Concern     None   Social History Narrative     None     Social Determinants of Health     Financial Resource Strain: Not on file   Food Insecurity: Not on file    Transportation Needs: Not on file   Physical Activity: Not on file   Stress: Not on file   Social Connections: Not on file   Intimate Partner Violence: Not on file   Housing Stability: Not on file     Family History   Problem Relation Age of Onset     Hypertension Mother          OBJECTIVE:    Vitals:    01/06/22 0814   BP: 128/72   Pulse: 80   SpO2: 99%   Weight: 143.3 kg (316 lb)     Body mass index is 50.24 kg/m .    Physical Exam:  Constitutional: Patient is oriented to person, place, and time. Patient appears well-developed and well-nourished. No distress.   Head: Normocephalic and atraumatic.   Right Ear: External ear normal.   Left Ear: External ear normal.   Eyes: Conjunctivae and EOM are normal. Right eye exhibits no discharge. Left eye exhibits no discharge. No scleral icterus.   Neurological: Patient is alert and oriented to person, place, and time.  Skin: No rash noted. Patient is not diaphoretic. No erythema. No pallor.    The patient has good eye contact.  No psychomotor retardation or stereotypical behaviors.  Speech was regular rate, regular rhythm, adequate responses.  Mood was stable and affect was congruent mood.  No suicidal or homicidal intent.  No hallucination.

## 2022-01-11 ASSESSMENT — PATIENT HEALTH QUESTIONNAIRE - PHQ9: SUM OF ALL RESPONSES TO PHQ QUESTIONS 1-9: 0

## 2022-01-22 ENCOUNTER — MYC MEDICAL ADVICE (OUTPATIENT)
Dept: FAMILY MEDICINE | Facility: CLINIC | Age: 42
End: 2022-01-22
Payer: COMMERCIAL

## 2022-01-25 NOTE — TELEPHONE ENCOUNTER
Office visit from 1-6-22,12/15/21, and 11-26-21 to [facility] at [phone].

## 2022-06-16 ENCOUNTER — OFFICE VISIT (OUTPATIENT)
Dept: FAMILY MEDICINE | Facility: CLINIC | Age: 42
End: 2022-06-16
Payer: COMMERCIAL

## 2022-06-16 VITALS
WEIGHT: 315 LBS | SYSTOLIC BLOOD PRESSURE: 128 MMHG | BODY MASS INDEX: 54.21 KG/M2 | HEART RATE: 72 BPM | DIASTOLIC BLOOD PRESSURE: 80 MMHG

## 2022-06-16 DIAGNOSIS — I10 ESSENTIAL HYPERTENSION: ICD-10-CM

## 2022-06-16 DIAGNOSIS — M25.562 ACUTE PAIN OF LEFT KNEE: Primary | ICD-10-CM

## 2022-06-16 DIAGNOSIS — F43.22 ADJUSTMENT DISORDER WITH ANXIOUS MOOD: ICD-10-CM

## 2022-06-16 DIAGNOSIS — J30.2 SEASONAL ALLERGIC RHINITIS, UNSPECIFIED TRIGGER: ICD-10-CM

## 2022-06-16 PROCEDURE — 99214 OFFICE O/P EST MOD 30 MIN: CPT | Performed by: FAMILY MEDICINE

## 2022-06-16 RX ORDER — HYDROCHLOROTHIAZIDE 25 MG/1
25 TABLET ORAL DAILY
Qty: 90 TABLET | Refills: 3 | Status: SHIPPED | OUTPATIENT
Start: 2022-06-16 | End: 2023-05-05

## 2022-06-16 RX ORDER — FLUTICASONE PROPIONATE 50 MCG
1 SPRAY, SUSPENSION (ML) NASAL DAILY
Qty: 16 G | Refills: 11 | Status: SHIPPED | OUTPATIENT
Start: 2022-06-16 | End: 2022-09-19

## 2022-06-16 RX ORDER — BUPROPION HYDROCHLORIDE 150 MG/1
TABLET, EXTENDED RELEASE ORAL
Qty: 180 TABLET | Refills: 3 | Status: SHIPPED | OUTPATIENT
Start: 2022-06-16 | End: 2023-02-28

## 2022-06-16 RX ORDER — LOSARTAN POTASSIUM 25 MG/1
25 TABLET ORAL 2 TIMES DAILY
Qty: 180 TABLET | Refills: 3 | Status: SHIPPED | OUTPATIENT
Start: 2022-06-16 | End: 2023-01-26

## 2022-06-16 RX ORDER — FLUTICASONE PROPIONATE 50 MCG
1 SPRAY, SUSPENSION (ML) NASAL DAILY
COMMUNITY
End: 2022-06-16

## 2022-06-16 NOTE — PROGRESS NOTES
Assessment & Plan     Acute pain of left knee  Worker's Comp. injury about a month ago.  He did not provide a specific date.  I personally reviewed the x-ray which did not show any fracture or dislocation.  I will refer him to physical therapy.  She may continue with over-the-counter analgesics.  advised RICE protocol  - XR Knee Left 3 Views  - Physical Therapy Referral    Essential hypertension  Stable.  Refilled medications.  Follow-up every 6 months  - hydrochlorothiazide (HYDRODIURIL) 25 MG tablet  Dispense: 90 tablet; Refill: 3  - losartan (COZAAR) 25 MG tablet  Dispense: 180 tablet; Refill: 3    Adjustment disorder with anxious mood  Stable.  Refilled medication.  Follow-up every 6 months  - buPROPion (WELLBUTRIN SR) 150 MG 12 hr tablet  Dispense: 180 tablet; Refill: 3    Seasonal allergic rhinitis, unspecified trigger  Stable.  Refilled medication.  Follow-up yearly  - fluticasone (FLONASE) 50 MCG/ACT nasal spray  Dispense: 16 g; Refill: 11      Pennyua Concepcion Oquendo MD  [facility]    Cornelia Talavera is a 42 year old  presenting for the following health issues:  Knee Pain (Left knee x 1 month. Feel at work 1 month ago)      HPI     42-year-old gentleman fell at work about a month ago.  He injured his left knee.  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  He attended the cold and in the process fell and injured his left knee.  He did not have any bruising at the time.  He rested the following week.  He was off work that following week.  When he returned to work the next week he noted worsening pain of the left knee especially when he is working long hours and on his feet.  Pain is along the medial and inferior aspect of the knee.  Pain is worse with weightbearing.  He has been unable to bear weight completely because of the pain.  Pain is described to be a 7 out of 10.  There is no pain at rest.  While at  work he did notice swelling of the knee and leg especially as the day progresses after a long day on his feet.  He has been taking ibuprofen 800 mg in addition to Tylenol 1000 mg every 8 hours for the pain.    Is also looking for refills of his medications.  He is on losartan 25 mg twice daily and hydrochlorothiazide 25 mg for hypertension.  Blood pressures under good control.  He is also taking bupropion for adjustment disorder.  He has made amends with his wife and they are back together.          Objective    /80   Pulse 72   Wt (!) 154.7 kg (341 lb)   BMI 54.21 kg/m    Body mass index is 54.21 kg/m .  Physical Exam     Constitutional: Patient is oriented to person, place, and time. Patient appears well-developed and well-nourished. No distress.   Head: Normocephalic and atraumatic.   Right Ear: External ear normal.   Left Ear: External ear normal.   Eyes: Conjunctivae and EOM are normal. Right eye exhibits no discharge. Left eye exhibits no discharge. No scleral icterus.   Neurological: Patient is alert and oriented to person, place, and time.  Skin: No rash noted. Patient is not diaphoretic. No erythema. No pallor.  Left knee: No swelling, deformity, ecchymosis.  He had a hard time with getting up from a sitting position.  He walks with an antalgic gait.

## 2022-07-01 ENCOUNTER — MYC MEDICAL ADVICE (OUTPATIENT)
Dept: FAMILY MEDICINE | Facility: CLINIC | Age: 42
End: 2022-07-01

## 2022-07-01 DIAGNOSIS — Z82.49 FAMILY HISTORY OF BRAIN ANEURYSM: Primary | ICD-10-CM

## 2022-07-01 NOTE — TELEPHONE ENCOUNTER
See MyChart from patient needing PCP review.    Please respond directly to patient if at all able.    Calista Calvin RN  [facility]

## 2022-07-16 ENCOUNTER — HEALTH MAINTENANCE LETTER (OUTPATIENT)
Age: 42
End: 2022-07-16

## 2022-08-30 ENCOUNTER — TELEPHONE (OUTPATIENT)
Dept: FAMILY MEDICINE | Facility: CLINIC | Age: 42
End: 2022-08-30

## 2022-08-30 DIAGNOSIS — R05.9 COUGH: Primary | ICD-10-CM

## 2022-08-30 RX ORDER — CODEINE PHOSPHATE AND GUAIFENESIN 10; 100 MG/5ML; MG/5ML
SOLUTION ORAL
Qty: 473 ML | Refills: 1 | Status: ON HOLD | OUTPATIENT
Start: 2022-08-30 | End: 2023-05-16

## 2022-08-30 NOTE — TELEPHONE ENCOUNTER
Pharmacy fax requesting refill on:     Virtussin A/C 100-10mg/5ml soln 100-10  Written Quantity 473  Sig: Take 10 ml by mouth three times a day as needed for cough. Last filled: 6/3/21  Last seen: 6/16/22    Not on current med list, cannot gladys up medication.    Lyssa AMANDA CMA ([facility])

## 2022-09-18 ENCOUNTER — HEALTH MAINTENANCE LETTER (OUTPATIENT)
Age: 42
End: 2022-09-18

## 2022-09-19 ENCOUNTER — NURSE TRIAGE (OUTPATIENT)
Dept: NURSING | Facility: CLINIC | Age: 42
End: 2022-09-19

## 2022-09-19 ENCOUNTER — TELEPHONE (OUTPATIENT)
Dept: FAMILY MEDICINE | Facility: CLINIC | Age: 42
End: 2022-09-19

## 2022-09-19 ENCOUNTER — OFFICE VISIT (OUTPATIENT)
Dept: FAMILY MEDICINE | Facility: CLINIC | Age: 42
End: 2022-09-19
Payer: COMMERCIAL

## 2022-09-19 VITALS
WEIGHT: 315 LBS | SYSTOLIC BLOOD PRESSURE: 126 MMHG | DIASTOLIC BLOOD PRESSURE: 74 MMHG | BODY MASS INDEX: 55.51 KG/M2 | HEART RATE: 71 BPM | OXYGEN SATURATION: 98 %

## 2022-09-19 DIAGNOSIS — R05.9 COUGH: Primary | ICD-10-CM

## 2022-09-19 DIAGNOSIS — I10 ESSENTIAL HYPERTENSION: ICD-10-CM

## 2022-09-19 DIAGNOSIS — J30.2 SEASONAL ALLERGIC RHINITIS, UNSPECIFIED TRIGGER: ICD-10-CM

## 2022-09-19 DIAGNOSIS — F43.22 ADJUSTMENT DISORDER WITH ANXIOUS MOOD: ICD-10-CM

## 2022-09-19 PROCEDURE — 99214 OFFICE O/P EST MOD 30 MIN: CPT | Performed by: FAMILY MEDICINE

## 2022-09-19 RX ORDER — BENZONATATE 200 MG/1
200 CAPSULE ORAL 3 TIMES DAILY PRN
Qty: 30 CAPSULE | Refills: 1 | Status: SHIPPED | OUTPATIENT
Start: 2022-09-19 | End: 2023-05-05

## 2022-09-19 RX ORDER — FLUTICASONE PROPIONATE 50 MCG
1 SPRAY, SUSPENSION (ML) NASAL DAILY
Qty: 16 G | Refills: 11 | Status: SHIPPED | OUTPATIENT
Start: 2022-09-19

## 2022-09-19 ASSESSMENT — ANXIETY QUESTIONNAIRES
6. BECOMING EASILY ANNOYED OR IRRITABLE: SEVERAL DAYS
7. FEELING AFRAID AS IF SOMETHING AWFUL MIGHT HAPPEN: SEVERAL DAYS
1. FEELING NERVOUS, ANXIOUS, OR ON EDGE: SEVERAL DAYS
4. TROUBLE RELAXING: SEVERAL DAYS
GAD7 TOTAL SCORE: 7
IF YOU CHECKED OFF ANY PROBLEMS ON THIS QUESTIONNAIRE, HOW DIFFICULT HAVE THESE PROBLEMS MADE IT FOR YOU TO DO YOUR WORK, TAKE CARE OF THINGS AT HOME, OR GET ALONG WITH OTHER PEOPLE: SOMEWHAT DIFFICULT
3. WORRYING TOO MUCH ABOUT DIFFERENT THINGS: SEVERAL DAYS
2. NOT BEING ABLE TO STOP OR CONTROL WORRYING: SEVERAL DAYS
5. BEING SO RESTLESS THAT IT IS HARD TO SIT STILL: SEVERAL DAYS

## 2022-09-19 ASSESSMENT — PATIENT HEALTH QUESTIONNAIRE - PHQ9: SUM OF ALL RESPONSES TO PHQ QUESTIONS 1-9: 4

## 2022-09-19 NOTE — TELEPHONE ENCOUNTER
Patient calling  And states he has an ongoing cough for a few weeks, and he also states he has anxiety.  He states he has not had a covid test in a few weeks.    He reports;  Anxiety is a long term problem, and cough has been going on for a few weeks. , and states it is causing chest pain.too.Patient admits that the cough is causing chest pain when he coughs   Patient advised to do covid test , and call again after test.  Patient agreed to poc.    Vika Luevano RN   [facility] Nurse Advisor       Additional Information    Negative: Bluish (or gray) lips or face    Negative: SEVERE difficulty breathing (e.g., struggling for each breath, speaks in single words)    Negative: Rapid onset of cough and has hives    Negative: Coughing started suddenly after medicine, an allergic food or bee sting    Negative: Difficulty breathing after exposure to flames, smoke, or fumes    Negative: Sounds like a life-threatening emergency to the triager    Negative: Previous asthma attacks and this feels like asthma attack    Negative: Dry cough (non-productive; no sputum or minimal clear sputum) and within 14 days of COVID-19 Exposure    Negative: MODERATE difficulty breathing (e.g., speaks in phrases, SOB even at rest, pulse 100-120) and still present when not coughing    Protocols used: COUGH-A-OH

## 2022-09-19 NOTE — LETTER
9/19/2022        RE: Ilan Matthew  [address]  [facility] [address]    Ilan Matthew is a patient of this clinic. I serve as his primary care physician. Please excuse him from work for 9/18/22-9/26/22.      Sincerely,        Jimy Oquendo MD

## 2022-09-19 NOTE — PROGRESS NOTES
Assessment & Plan     Cough  I personally reviewed the chest x-ray which did not show any acute pulmonary process.  I will refill Flonase and given benzonatate.  Consider oral antihistamine if not better.  Continue with supportive cares and follow-up as needed  - fluticasone (FLONASE) 50 MCG/ACT nasal spray  Dispense: 16 g; Refill: 11  - XR Chest 2 Views  - benzonatate (TESSALON) 200 MG capsule  Dispense: 30 capsule; Refill: 1    Seasonal allergic rhinitis, unspecified trigger  Refilled Flonase  Consider oral antihistamine as in addition if he is still symptomatic  - fluticasone (FLONASE) 50 MCG/ACT nasal spray  Dispense: 16 g; Refill: 11    Adjustment disorder with anxious mood  PHQ9=7, GAD7=4   Motivational interviewing was utilized today  We briefly spoke about pharmacotherapy and psychotherapy  He will think about his options and call back  Work letter was provided to the patient    Essential hypertension  Stable on losartan and hydrochlorothiazide    Jimy Oquendo MD  [facility]    Cornelia Talavera is a 42 year old presenting for the following health issues:  Cough (X 2 weeks productive cough) and Anxiety (For awhile)      HPI     For the last 3 weeks now he has had a productive cough.  The cough is worse at night.  He has associated runny nose.  No shortness of breath, wheezing, fever.    He has had some organizational changes at work.  He is having increased stress level at his work.  He has missed several days of work and looking for a note for work.  The organizational changes has created significant amount of stress whereby he is looking for another job. PHQ9=7, GAD7=4        Objective    /74   Pulse 71   Wt (!) 158.4 kg (349 lb 2 oz)   SpO2 98%   BMI 55.51 kg/m    Body mass index is 55.51 kg/m .  Physical Exam       Objective:    Physical Exam   /74   Pulse 71   Wt (!) 158.4 kg (349 lb 2 oz)   SpO2 98%   BMI 55.51 kg/m      Constitutional: Patient is oriented to person, place, and time. Patient appears well-developed and well-nourished. No distress.   Head: Normocephalic and atraumatic.   Right Ear: External ear normal.   Left Ear: External ear normal.   Eyes: Conjunctivae and EOM are normal. Right eye exhibits no discharge. Left eye exhibits no discharge. No scleral icterus.   Neck: Neck supple. No JVD present. No tracheal deviation present. No thyromegaly present.   Cardiovascular: Normal rate, regular rhythm, normal heart sounds and intact distal pulses. No murmur heard.   Pulmonary/Chest: Effort normal and breath sounds normal. No stridor. No respiratory distress. Patient has no wheezes, no rales, exhibits no tenderness.   Neurological: Patient is alert and oriented to person, place, and time. coordination normal.   Skin: Skin is warm and dry. No rash noted. Patient is not diaphoretic. No erythema. No pallor.     The patient has good eye contact.  No psychomotor retardation or stereotypical behaviors.  Speech was regular rate, regular rhythm, adequate responses.  Mood was stable and affect was congruent mood.  No suicidal or homicidal intent.  No hallucination.

## 2022-09-19 NOTE — TELEPHONE ENCOUNTER
Writer called pt back. Pt requesting chest x-ray today for chest pain from coughing.    I offered pt appt to be seen on Wednesday, pt requesting X-ray today.    Please advise.    Calista Calvin RN

## 2022-09-19 NOTE — TELEPHONE ENCOUNTER
Patient called back and stated he took a covid home test and tested negative at 7:52am this morning.      As per RN note below, patient called back to schedule an appointment.

## 2022-10-10 ENCOUNTER — TELEPHONE (OUTPATIENT)
Dept: FAMILY MEDICINE | Facility: CLINIC | Age: 42
End: 2022-10-10

## 2022-10-10 ENCOUNTER — MYC MEDICAL ADVICE (OUTPATIENT)
Dept: FAMILY MEDICINE | Facility: CLINIC | Age: 42
End: 2022-10-10

## 2022-10-10 NOTE — TELEPHONE ENCOUNTER
[place] forms dropped off to be completed by Dr. Hewitt.    Please fax to [phone] when done.  Forms need to be received by 10/17.    Routed to  [employer].

## 2022-10-17 NOTE — TELEPHONE ENCOUNTER
Faxed completed form, also told patient to call back if he would like to  a copy. I have printed a copy and is at the  to be picked up.

## 2023-01-25 DIAGNOSIS — I10 ESSENTIAL HYPERTENSION: ICD-10-CM

## 2023-01-25 NOTE — TELEPHONE ENCOUNTER
General Call    Contacts       Type Contact Phone/Fax    01/25/2023 02:46 PM CST Phone (Incoming) Ilan Matthew (Self) [phone] (H)        Reason for Call:  Medication refill    What are your questions or concerns:  Pt states he only has 1 pill left that he will be taking tonight and after tonight he won't have any medication left. Pt states this is an urgent matter and is hoping we can send in the Rx today.    Date of last appointment with provider: 09/19/2022    Could we send this information to you [facility] or would you prefer to receive a phone call?:      [phone] (Home)       Xochilt Kimball

## 2023-01-26 RX ORDER — LOSARTAN POTASSIUM 25 MG/1
25 TABLET ORAL 2 TIMES DAILY
Qty: 180 TABLET | Refills: 0 | Status: SHIPPED | OUTPATIENT
Start: 2023-01-26 | End: 2023-02-28

## 2023-01-26 NOTE — TELEPHONE ENCOUNTER
This message was routed to Dr. Hewitt who is no longer at [place]. Patient is completely out of his medication and needs a refill.    Writer scheduled an appointment for patient to establish care with a provider at [place].  The soonest available appointment was scheduled for 2.28.23.    Please refill patients' losartan with enough medication to get him to his appointment in February 28th, 2023.    Patient is requesting a RETURN CALL when the medication is sent to his pharmacy.

## 2023-02-28 ENCOUNTER — OFFICE VISIT (OUTPATIENT)
Dept: FAMILY MEDICINE | Facility: CLINIC | Age: 43
End: 2023-02-28
Payer: COMMERCIAL

## 2023-02-28 VITALS
OXYGEN SATURATION: 98 % | HEART RATE: 76 BPM | WEIGHT: 315 LBS | RESPIRATION RATE: 14 BRPM | SYSTOLIC BLOOD PRESSURE: 124 MMHG | BODY MASS INDEX: 49.44 KG/M2 | DIASTOLIC BLOOD PRESSURE: 78 MMHG | HEIGHT: 67 IN | TEMPERATURE: 98.4 F

## 2023-02-28 DIAGNOSIS — R73.03 PREDIABETES: ICD-10-CM

## 2023-02-28 DIAGNOSIS — Z13.29 SCREENING FOR THYROID DISORDER: ICD-10-CM

## 2023-02-28 DIAGNOSIS — R06.83 SNORES: ICD-10-CM

## 2023-02-28 DIAGNOSIS — Z12.11 SCREEN FOR COLON CANCER: ICD-10-CM

## 2023-02-28 DIAGNOSIS — E66.01 CLASS 3 SEVERE OBESITY DUE TO EXCESS CALORIES WITH SERIOUS COMORBIDITY AND BODY MASS INDEX (BMI) OF 50.0 TO 59.9 IN ADULT (H): ICD-10-CM

## 2023-02-28 DIAGNOSIS — E66.813 CLASS 3 SEVERE OBESITY DUE TO EXCESS CALORIES WITH SERIOUS COMORBIDITY AND BODY MASS INDEX (BMI) OF 50.0 TO 59.9 IN ADULT (H): ICD-10-CM

## 2023-02-28 DIAGNOSIS — I10 ESSENTIAL HYPERTENSION: Primary | ICD-10-CM

## 2023-02-28 DIAGNOSIS — E78.5 DYSLIPIDEMIA: ICD-10-CM

## 2023-02-28 DIAGNOSIS — R29.818 SUSPECTED SLEEP APNEA: ICD-10-CM

## 2023-02-28 DIAGNOSIS — E66.01 MORBID OBESITY (H): ICD-10-CM

## 2023-02-28 PROBLEM — F33.0 MILD EPISODE OF RECURRENT MAJOR DEPRESSIVE DISORDER (H): Status: ACTIVE | Noted: 2023-02-28

## 2023-02-28 LAB
ALT SERPL W P-5'-P-CCNC: 40 U/L (ref 10–50)
ANION GAP SERPL CALCULATED.3IONS-SCNC: 11 MMOL/L (ref 7–15)
AST SERPL W P-5'-P-CCNC: 39 U/L (ref 10–50)
BUN SERPL-MCNC: 15.7 MG/DL (ref 6–20)
CALCIUM SERPL-MCNC: 9.2 MG/DL (ref 8.6–10)
CHLORIDE SERPL-SCNC: 103 MMOL/L (ref 98–107)
CHOLEST SERPL-MCNC: 203 MG/DL
CREAT SERPL-MCNC: 1.09 MG/DL (ref 0.67–1.17)
DEPRECATED HCO3 PLAS-SCNC: 26 MMOL/L (ref 22–29)
GFR SERPL CREATININE-BSD FRML MDRD: 85 ML/MIN/1.73M2
GLUCOSE SERPL-MCNC: 103 MG/DL (ref 70–99)
HBA1C MFR BLD: 5.6 % (ref 0–5.6)
HDLC SERPL-MCNC: 37 MG/DL
LDLC SERPL CALC-MCNC: 135 MG/DL
NONHDLC SERPL-MCNC: 166 MG/DL
POTASSIUM SERPL-SCNC: 3.9 MMOL/L (ref 3.4–5.3)
SODIUM SERPL-SCNC: 140 MMOL/L (ref 136–145)
TRIGL SERPL-MCNC: 153 MG/DL
TSH SERPL DL<=0.005 MIU/L-ACNC: 1.65 UIU/ML (ref 0.3–4.2)

## 2023-02-28 PROCEDURE — 99214 OFFICE O/P EST MOD 30 MIN: CPT | Performed by: NURSE PRACTITIONER

## 2023-02-28 PROCEDURE — 83036 HEMOGLOBIN GLYCOSYLATED A1C: CPT | Performed by: NURSE PRACTITIONER

## 2023-02-28 PROCEDURE — 84450 TRANSFERASE (AST) (SGOT): CPT | Performed by: NURSE PRACTITIONER

## 2023-02-28 PROCEDURE — 80048 BASIC METABOLIC PNL TOTAL CA: CPT | Performed by: NURSE PRACTITIONER

## 2023-02-28 PROCEDURE — 84460 ALANINE AMINO (ALT) (SGPT): CPT | Performed by: NURSE PRACTITIONER

## 2023-02-28 PROCEDURE — 84443 ASSAY THYROID STIM HORMONE: CPT | Performed by: NURSE PRACTITIONER

## 2023-02-28 PROCEDURE — 80061 LIPID PANEL: CPT | Performed by: NURSE PRACTITIONER

## 2023-02-28 PROCEDURE — 36415 COLL VENOUS BLD VENIPUNCTURE: CPT | Performed by: NURSE PRACTITIONER

## 2023-02-28 RX ORDER — LOSARTAN POTASSIUM 25 MG/1
25 TABLET ORAL 2 TIMES DAILY
Qty: 180 TABLET | Refills: 3 | Status: SHIPPED | OUTPATIENT
Start: 2023-02-28 | End: 2024-01-15

## 2023-02-28 RX ORDER — SEMAGLUTIDE 0.5 MG/.5ML
0.5 INJECTION, SOLUTION SUBCUTANEOUS WEEKLY
Qty: 2 ML | Refills: 0 | Status: SHIPPED | OUTPATIENT
Start: 2023-02-28 | End: 2023-03-29

## 2023-02-28 RX ORDER — LOSARTAN POTASSIUM 25 MG/1
25 TABLET ORAL 2 TIMES DAILY
Qty: 180 TABLET | Refills: 0 | Status: SHIPPED | OUTPATIENT
Start: 2023-02-28 | End: 2023-02-28

## 2023-02-28 ASSESSMENT — ANXIETY QUESTIONNAIRES
7. FEELING AFRAID AS IF SOMETHING AWFUL MIGHT HAPPEN: NOT AT ALL
2. NOT BEING ABLE TO STOP OR CONTROL WORRYING: NOT AT ALL
2. NOT BEING ABLE TO STOP OR CONTROL WORRYING: NOT AT ALL
7. FEELING AFRAID AS IF SOMETHING AWFUL MIGHT HAPPEN: NOT AT ALL
7. FEELING AFRAID AS IF SOMETHING AWFUL MIGHT HAPPEN: NOT AT ALL
4. TROUBLE RELAXING: NOT AT ALL
GAD7 TOTAL SCORE: 2
6. BECOMING EASILY ANNOYED OR IRRITABLE: NOT AT ALL
IF YOU CHECKED OFF ANY PROBLEMS ON THIS QUESTIONNAIRE, HOW DIFFICULT HAVE THESE PROBLEMS MADE IT FOR YOU TO DO YOUR WORK, TAKE CARE OF THINGS AT HOME, OR GET ALONG WITH OTHER PEOPLE: NOT DIFFICULT AT ALL
GAD7 TOTAL SCORE: 0
IF YOU CHECKED OFF ANY PROBLEMS ON THIS QUESTIONNAIRE, HOW DIFFICULT HAVE THESE PROBLEMS MADE IT FOR YOU TO DO YOUR WORK, TAKE CARE OF THINGS AT HOME, OR GET ALONG WITH OTHER PEOPLE: NOT DIFFICULT AT ALL
5. BEING SO RESTLESS THAT IT IS HARD TO SIT STILL: NOT AT ALL
GAD7 TOTAL SCORE: 2
1. FEELING NERVOUS, ANXIOUS, OR ON EDGE: NOT AT ALL
3. WORRYING TOO MUCH ABOUT DIFFERENT THINGS: NOT AT ALL
6. BECOMING EASILY ANNOYED OR IRRITABLE: NOT AT ALL
5. BEING SO RESTLESS THAT IT IS HARD TO SIT STILL: NOT AT ALL
GAD7 TOTAL SCORE: 2
1. FEELING NERVOUS, ANXIOUS, OR ON EDGE: NOT AT ALL
3. WORRYING TOO MUCH ABOUT DIFFERENT THINGS: MORE THAN HALF THE DAYS
8. IF YOU CHECKED OFF ANY PROBLEMS, HOW DIFFICULT HAVE THESE MADE IT FOR YOU TO DO YOUR WORK, TAKE CARE OF THINGS AT HOME, OR GET ALONG WITH OTHER PEOPLE?: NOT DIFFICULT AT ALL

## 2023-02-28 ASSESSMENT — PATIENT HEALTH QUESTIONNAIRE - PHQ9
SUM OF ALL RESPONSES TO PHQ QUESTIONS 1-9: 0
SUM OF ALL RESPONSES TO PHQ QUESTIONS 1-9: 0
10. IF YOU CHECKED OFF ANY PROBLEMS, HOW DIFFICULT HAVE THESE PROBLEMS MADE IT FOR YOU TO DO YOUR WORK, TAKE CARE OF THINGS AT HOME, OR GET ALONG WITH OTHER PEOPLE: NOT DIFFICULT AT ALL
5. POOR APPETITE OR OVEREATING: NOT AT ALL

## 2023-02-28 NOTE — PATIENT INSTRUCTIONS
Hello;    Thank you for allowing me to be a part of your care.  If you have any other questions or concerns about your visit, please do not hesitate to call us, or send a message through the online messaging system.     Some take away points that I like patients to know is or remember are:    Vitamin D is an important supplement, especially in these long winter days. Typically 1000 to 2000 international unit(s) per day is a sufficient dose. This can be bought over the counter.     Calcium Recommendations:  14 to 18 years old: 1,300 mg--this can be through food sources  19 to 30 years old: 1,000 mg--this can be through food sources  31 to 50 years old: 1,000 mg  51 to 70 years old, women: 1,200 mg  51 to 70 years old, men: 1,000 mg  Pregnant or nursin to 18 years old: 1,300 mg, 19 to 50 years old: 1,000 mg  Older than 70 (women and men): 1,200 mg    For the Shingrix Vaccine; we recommend you double check with your insurance to make sure it is covered at our clinic.    A triage nurse is available to help you over the phone with questions during clinic hours. If after hours, please call the [place] nurse-line.     Ways to get your review your results on labs/imaging is through [facility]. Other ways is by us calling you via the phone or sending you a letter. If labs are not alarming (ie critical), it can take up to one to two weeks to get a response from me, but I try my hardest to respond quickly.     If you have mychart, you will be able to review your labs and tests sometimes prior to me seeing them. If anything is alarming (critical), we do have ancillary staff that also review tests while I am gone, and will contact you.     And, as always, you can contact the triage nurse if you have any concerns.     Please take care!      TENA Austin CNP

## 2023-02-28 NOTE — PROGRESS NOTES
Assessment & Plan     Screen for colon cancer    - Colonoscopy Screening  Referral    Essential hypertension    - Basic metabolic panel  - Adult Sleep Eval & Management  Referral  - losartan (COZAAR) 25 MG tablet  Dispense: 180 tablet; Refill: 3  - Basic metabolic panel    Morbid obesity (H)  *  - Semaglutide-Weight Management (WEGOVY) 0.25 MG/0.5ML pen  Dispense: 2 mL; Refill: 0  - Semaglutide-Weight Management (WEGOVY) 0.5 MG/0.5ML pen  Dispense: 2 mL; Refill: 0  - Adult Sleep Eval & Management  Referral    Class 3 severe obesity due to excess calories with serious comorbidity and body mass index (BMI) of 50.0 to 59.9 in adult (H)    - Adult Sleep Eval & Management  Referral    Dyslipidemia    - Lipid Profile (Chol, Trig, HDL, LDL calc)  - AST  - ALT  - Lipid Profile (Chol, Trig, HDL, LDL calc)  - AST  - ALT    Snores    - Adult Sleep Eval & Management  Referral    Prediabetes    - Semaglutide-Weight Management (WEGOVY) 0.25 MG/0.5ML pen  Dispense: 2 mL; Refill: 0  - Semaglutide-Weight Management (WEGOVY) 0.5 MG/0.5ML pen  Dispense: 2 mL; Refill: 0  - Hemoglobin A1c  - Hemoglobin A1c    Suspected sleep apnea    - Adult Sleep Eval & Management  Referral    -I discussed weight loss with patient today.  BMI is 55.  Ozempic has been ordered.  He has no known history of contraindications for this medication.  I discussed the possible side effects.  He will continue to work on lifestyle changes that would include low carbohydrate diet and regular exercise.  I will like him to follow-up within 2 months to recheck his weight, and to possibly increase the dose.  If prior Auth is needed, I possibly will order the MTM to further evaluate what we can do for him.  The other option potentially could be the weight management team.   -Has risk factors for sleep apnea and this was discussed with patient today.  I also spent time discussing the possible complications of  "ongoing diagnoses apnea.  He is willing to get a sleep test done, order has been placed.   -Blood pressure is stable, he can continue losartan and hydrochlorothiazide.  I have ordered a bmp.   -I reviewed his prior labs with him today.                BMI:   Estimated body mass index is 55.01 kg/m  as calculated from the following:    Height as of this encounter: 1.689 m (5' 6.5\").    Weight as of this encounter: 156.9 kg (346 lb).   Weight management plan: Discussed healthy diet and exercise guidelines ozempic ordered.        No follow-ups on file.    TENA Austin CNP  [facility]    Cornelia Talavera is a 43 year old, presenting for the following health issues:  Establish Care and Recheck Medication    -He is present today to establish care and get his medications refilled.  He stated that he likes to divide his losartan dose because he can sometimes get headaches in the middle the night.  He does endorse snoring, and will sometimes notice that he gasp or choke for air at night.  He was notified that he should get a sleep study done but just never did it.  He stated that he has tried numerous diets and medications to try to lose weight.  He is interested in losing weight and becoming healthier.  He really does not think that he eats really bad during the day.  He stopped Wellbutrin, and feels that his depression and anxiety are under control.  His PHQ-9 is negative today.     History of Present Illness       Hypertension: He presents for follow up of hypertension.  He does not check blood pressure  regularly outside of the clinic. Outpatient blood pressures have not been over 140/90. He does not follow a low salt diet.     He eats 2-3 servings of fruits and vegetables daily.He consumes 2 sweetened beverage(s) daily.He exercises with enough effort to increase his heart rate 20 to 29 minutes per day.  He exercises with enough effort to increase his heart rate 3 or less days " "per week. He is missing 1 dose(s) of medications per week.  He is not taking prescribed medications regularly due to remembering to take.    Today's PHQ-9         PHQ-9 Total Score: 0    PHQ-9 Q9 Thoughts of better off dead/self-harm past 2 weeks :   Not at all    How difficult have these problems made it for you to do your work, take care of things at home, or get along with other people: Not difficult at all  Today's MICHELLE-7 Score: 2             Review of Systems   Constitutional, HEENT, cardiovascular, pulmonary, gi and gu systems are negative, except as otherwise noted.      Objective    /78   Pulse 76   Temp 98.4  F (36.9  C) (Oral)   Resp 14   Ht 1.689 m (5' 6.5\")   Wt (!) 156.9 kg (346 lb)   SpO2 98%   BMI 55.01 kg/m    Body mass index is 55.01 kg/m .  Physical Exam   GENERAL: healthy, alert and no distress  NEURO: Normal strength and tone, mentation intact and speech normal  PSYCH: mentation appears normal, affect normal/bright    Office Visit - [facility] on 06/29/2021   Component Date Value Ref Range Status     Prostate Specific Antigen Screen 06/29/2021 0.3  0.0 - 2.5 ng/mL Final     Cholesterol 06/29/2021 148  <=199 mg/dL Final     Triglycerides 06/29/2021 44  <=149 mg/dL Final     Direct Measure HDL 06/29/2021 37 (L)  >=40 mg/dL Final     LDL Cholesterol Calculated 06/29/2021 102  <=129 mg/dL Final     Patient Fasting > 8hrs? 06/29/2021 Yes   Final     Sodium 06/29/2021 140  136 - 145 mmol/L Final     Potassium 06/29/2021 3.8  3.5 - 5.0 mmol/L Final     Chloride 06/29/2021 103  98 - 107 mmol/L Final     Carbon Dioxide (CO2) 06/29/2021 26  22 - 31 mmol/L Final     Anion Gap 06/29/2021 11  5 - 18 mmol/L Final     Glucose 06/29/2021 105  70 - 125 mg/dL Final     Urea Nitrogen 06/29/2021 14  8 - 22 mg/dL Final     Creatinine 06/29/2021 0.92  0.70 - 1.30 mg/dL Final     GFR Estimate If Black 06/29/2021 >60  >60 mL/min/1.73m2 Final     GFR Estimate 06/29/2021 >60  >60 mL/min/1.73m2 Final     " Bilirubin Total 06/29/2021 0.4  0.0 - 1.0 mg/dL Final     Calcium 06/29/2021 8.9  8.5 - 10.5 mg/dL Final     Protein Total 06/29/2021 6.8  6.0 - 8.0 g/dL Final     Albumin 06/29/2021 3.6  3.5 - 5.0 g/dL Final     Alkaline Phosphatase 06/29/2021 115  45 - 120 U/L Final     AST 06/29/2021 18  0 - 40 U/L Final     ALT 06/29/2021 24  0 - 45 U/L Final

## 2023-03-05 NOTE — RESULT ENCOUNTER NOTE
Julio Cesar Talavera    It was very nice to have met you!!    Your kidney function is stable. Your Hgba1c is stable at 5.6. Anything above 5.7 is prediabetes. I am so excited for your desire to lose weight. This will further aide in lowering your chance for diabetes type II.     Your liver enzymes are up some from last year. Ways to get these values lower is by working on low carbohydrate diet and weight loss. With us working on these factors, your liver enzymes will benefit    You do not need a statin medication at this time. Continue to eat a diet that is low in saturated fats.     The 10-year ASCVD risk score (Eliseo DUENAS, et al., 2019) is: 3.3%    Values used to calculate the score:      Age: 43 years      Sex: Male      Is Non- : No      Diabetic: No      Tobacco smoker: No      Systolic Blood Pressure: 124 mmHg      Is BP treated: Yes      HDL Cholesterol: 37 mg/dL      Total Cholesterol: 203 mg/dL    Any questions, please let me know.     Veronika

## 2023-03-07 ENCOUNTER — OFFICE VISIT (OUTPATIENT)
Dept: FAMILY MEDICINE | Facility: CLINIC | Age: 43
End: 2023-03-07
Payer: COMMERCIAL

## 2023-03-07 VITALS
HEIGHT: 68 IN | DIASTOLIC BLOOD PRESSURE: 70 MMHG | SYSTOLIC BLOOD PRESSURE: 120 MMHG | HEART RATE: 92 BPM | BODY MASS INDEX: 47.74 KG/M2 | WEIGHT: 315 LBS | TEMPERATURE: 97.5 F | OXYGEN SATURATION: 95 %

## 2023-03-07 DIAGNOSIS — Z12.11 SCREEN FOR COLON CANCER: ICD-10-CM

## 2023-03-07 DIAGNOSIS — Z12.11 SCREENING FOR COLON CANCER: Primary | ICD-10-CM

## 2023-03-07 DIAGNOSIS — I10 ESSENTIAL HYPERTENSION: ICD-10-CM

## 2023-03-07 DIAGNOSIS — F33.0 MILD EPISODE OF RECURRENT MAJOR DEPRESSIVE DISORDER (H): ICD-10-CM

## 2023-03-07 DIAGNOSIS — E66.01 MORBID OBESITY (H): ICD-10-CM

## 2023-03-07 DIAGNOSIS — Z00.00 HEALTHCARE MAINTENANCE: ICD-10-CM

## 2023-03-07 PROCEDURE — 99214 OFFICE O/P EST MOD 30 MIN: CPT | Performed by: STUDENT IN AN ORGANIZED HEALTH CARE EDUCATION/TRAINING PROGRAM

## 2023-03-07 ASSESSMENT — PAIN SCALES - GENERAL: PAINLEVEL: NO PAIN (0)

## 2023-03-07 NOTE — PROGRESS NOTES
Assessment and Plan     43-year-old male with past medical history of obesity, essential hypertension who presents for establishment of care.  Patient has been looking for new provider since his last retired.    1. Screen for colon cancer  - Colonoscopy Screening  Referral; Future    2. Morbid obesity (H)  Patient has tried phentermine. As well as another he cannot remember that was a pill. Had side effects. Now started wegovy by previous doctor. Wants ot work on diet and exercise and would maybe consider bariatric int he future. Will let me know    3. Essential hypertension  - BP goal:   - Currently taking meds: losartan 25 mg BID, hydrochlorothiazide 25 mg nightly  - Any side effects: cough with lisinopril  - Last BMP: 2/23- normal  - Any symptoms of HTN such as chest pain, headaches, vision changes, nausea: headaches if doesn't take hydrochlorothiazide at night    BP Readings from Last 6 Encounters:   03/07/23 120/70   02/28/23 124/78   09/19/22 126/74   06/16/22 128/80   01/06/22 128/72   12/15/21 136/80     5. Healthcare maintenance    6. Mild episode of recurrent major depressive disorder (H)  Some days more down. Not a problem now.    Follow up: 1 year for physical  Options for treatment and follow-up care were reviewed with the patient and/or guardian. Ilan Matthew and/or guardian engaged in the decision making process and verbalized understanding of the options discussed and agreed with the final plan.    Dr. Peter Izquierdo         HPI:   Ilan Matthew is a 43 year old  male who presents for:    Chief Complaint   Patient presents with     Physical     Establish Care     Hypertension              PMHX:     Patient Active Problem List   Diagnosis     Hypertrophy Of Breast     Essential hypertension     Morbid obesity (H)     Mild episode of recurrent major depressive disorder (H)       Social History     Tobacco Use     Smoking status: Never     Smokeless tobacco: Never       Social History  "    Social History Narrative     Not on file       Allergies   Allergen Reactions     Fish Oil [Omega-3 Fatty Acids] Other (See Comments)     Swollen tongue       No results found for this or any previous visit (from the past 24 hour(s)).         Review of Systems:    ROS: 10 point ROS neg other than the symptoms noted above in the HPI.         Physical Exam:     Vitals:    03/07/23 0949   BP: 120/70   Pulse: 92   Temp: 97.5  F (36.4  C)   SpO2: 95%   Weight: (!) 164.2 kg (362 lb 1 oz)   Height: 1.727 m (5' 8\")     Body mass index is 55.05 kg/m .    General appearance: Alert, cooperative, no distress, appears stated age, obese  Head: Normocephalic, atraumatic, without obvious abnormality  Eyes: Pupils equal round, reactive.  Conjunctiva clear.  Nose: Nares normal, no drainage.  Throat: Lips, mucosa, tongue normal mucosa pink and moist  Neck: Supple, symmetric, trachea midline,            "

## 2023-03-28 DIAGNOSIS — E66.01 MORBID OBESITY (H): ICD-10-CM

## 2023-03-28 DIAGNOSIS — R73.03 PREDIABETES: ICD-10-CM

## 2023-03-29 RX ORDER — SEMAGLUTIDE 0.5 MG/.5ML
0.5 INJECTION, SOLUTION SUBCUTANEOUS WEEKLY
Qty: 2 ML | Refills: 0 | Status: SHIPPED | OUTPATIENT
Start: 2023-03-29 | End: 2023-05-05

## 2023-03-29 NOTE — TELEPHONE ENCOUNTER
Routing refill request to provider for review/approval because:  Drug not on the [facility] refill protocol     Last Written Prescription Date: 2/28/2023  Last Fill Quantity: 2 ml,  # refills: 0   Last office visit provider: 3/7/2023 with PCP Dr JOSEFINA Rios    Requested Prescriptions   Pending Prescriptions Disp Refills     Semaglutide-Weight Management (WEGOVY) 0.5 MG/0.5ML pen 2 mL 0     Sig: Inject 0.5 mg Subcutaneous once a week       There is no refill protocol information for this order          Noris Austin RN 03/29/23 3:02 PM

## 2023-05-05 ENCOUNTER — OFFICE VISIT (OUTPATIENT)
Dept: FAMILY MEDICINE | Facility: CLINIC | Age: 43
End: 2023-05-05
Payer: COMMERCIAL

## 2023-05-05 VITALS
TEMPERATURE: 98.9 F | WEIGHT: 315 LBS | BODY MASS INDEX: 47.74 KG/M2 | HEART RATE: 81 BPM | SYSTOLIC BLOOD PRESSURE: 138 MMHG | RESPIRATION RATE: 14 BRPM | DIASTOLIC BLOOD PRESSURE: 87 MMHG | OXYGEN SATURATION: 98 % | HEIGHT: 68 IN

## 2023-05-05 DIAGNOSIS — I10 ESSENTIAL HYPERTENSION: ICD-10-CM

## 2023-05-05 DIAGNOSIS — E66.01 MORBID OBESITY (H): ICD-10-CM

## 2023-05-05 DIAGNOSIS — Z01.818 PREOP GENERAL PHYSICAL EXAM: Primary | ICD-10-CM

## 2023-05-05 LAB
ANION GAP SERPL CALCULATED.3IONS-SCNC: 12 MMOL/L (ref 7–15)
BUN SERPL-MCNC: 13.8 MG/DL (ref 6–20)
CALCIUM SERPL-MCNC: 9.6 MG/DL (ref 8.6–10)
CHLORIDE SERPL-SCNC: 104 MMOL/L (ref 98–107)
CREAT SERPL-MCNC: 0.9 MG/DL (ref 0.67–1.17)
DEPRECATED HCO3 PLAS-SCNC: 24 MMOL/L (ref 22–29)
ERYTHROCYTE [DISTWIDTH] IN BLOOD BY AUTOMATED COUNT: 13.8 % (ref 10–15)
GFR SERPL CREATININE-BSD FRML MDRD: >90 ML/MIN/1.73M2
GLUCOSE SERPL-MCNC: 81 MG/DL (ref 70–99)
HCT VFR BLD AUTO: 44.7 % (ref 40–53)
HGB BLD-MCNC: 14.7 G/DL (ref 13.3–17.7)
MCH RBC QN AUTO: 28.2 PG (ref 26.5–33)
MCHC RBC AUTO-ENTMCNC: 32.9 G/DL (ref 31.5–36.5)
MCV RBC AUTO: 86 FL (ref 78–100)
PLATELET # BLD AUTO: 246 10E3/UL (ref 150–450)
POTASSIUM SERPL-SCNC: 4 MMOL/L (ref 3.4–5.3)
RBC # BLD AUTO: 5.21 10E6/UL (ref 4.4–5.9)
SODIUM SERPL-SCNC: 140 MMOL/L (ref 136–145)
WBC # BLD AUTO: 7.2 10E3/UL (ref 4–11)

## 2023-05-05 PROCEDURE — 85027 COMPLETE CBC AUTOMATED: CPT | Performed by: NURSE PRACTITIONER

## 2023-05-05 PROCEDURE — 93005 ELECTROCARDIOGRAM TRACING: CPT | Performed by: NURSE PRACTITIONER

## 2023-05-05 PROCEDURE — 99214 OFFICE O/P EST MOD 30 MIN: CPT | Performed by: NURSE PRACTITIONER

## 2023-05-05 PROCEDURE — 36415 COLL VENOUS BLD VENIPUNCTURE: CPT | Performed by: NURSE PRACTITIONER

## 2023-05-05 PROCEDURE — 80048 BASIC METABOLIC PNL TOTAL CA: CPT | Performed by: NURSE PRACTITIONER

## 2023-05-05 PROCEDURE — 93010 ELECTROCARDIOGRAM REPORT: CPT | Performed by: INTERNAL MEDICINE

## 2023-05-05 RX ORDER — HYDROCHLOROTHIAZIDE 25 MG/1
25 TABLET ORAL DAILY
Qty: 90 TABLET | Refills: 3 | Status: SHIPPED | OUTPATIENT
Start: 2023-05-05 | End: 2024-05-30

## 2023-05-05 ASSESSMENT — PATIENT HEALTH QUESTIONNAIRE - PHQ9: SUM OF ALL RESPONSES TO PHQ QUESTIONS 1-9: 0

## 2023-05-05 NOTE — PROGRESS NOTES
[facility]  [address] [address]  Phone: [phone]  Fax: [phone]  Primary Provider: Peter Rios  Pre-op Performing Provider: ERIC HINTON      PREOPERATIVE EVALUATION:  Today's date: 5/5/2023    Ilan Matthew is a 43 year old male who presents for a preoperative evaluation.       View : No data to display.              Surgical Information:  Surgery/Procedure: COLONOSCOPY  Surgery Location. [facility] Main OR  Surgeon: Nasir Varma MD  Surgery Date: 05/16/2023  Time of Surgery: 8:05 AM  Where patient plans to recover: At home with family  Fax number for surgical facility: Note does not need to be faxed, will be available electronically in Epic.    Assessment & Plan     The proposed surgical procedure is considered INTERMEDIATE risk.    Morbid obesity (H)    - Semaglutide, 1 MG/DOSE, (OZEMPIC) 4 MG/3ML pen  Dispense: 3 mL; Refill: 1    Essential hypertension  - VSS.   - hydrochlorothiazide (HYDRODIURIL) 25 MG tablet  Dispense: 90 tablet; Refill: 3  - EKG 12-lead, tracing only    Preop general physical exam    - EKG 12-lead, tracing only  - Basic metabolic panel  - CBC with platelets  - Basic metabolic panel  - CBC with platelets      Risks and Recommendations:  The patient has the following additional risks and recommendations for perioperative complications:   - Morbid obesity (BMI >40)    No AM medications the day of procedure.     Hold aspirins, NSAID's and vitamins for seven days     RECOMMENDATION:  APPROVAL GIVEN to proceed with proposed procedure, without further diagnostic evaluation. Please monitor for sleep apnea.       Subjective     HPI related to upcoming procedure:  Colonoscopy for screening needed. - Morbid obesity and endorses snoring, and will sometimes notice that he gasp or choke for air at night.  Sleep study ordered.         5/5/2023     3:14 PM   Preop Questions   1. Have you ever had a heart attack or stroke? No   2.  Have you ever had surgery on your heart or blood vessels, such as a stent placement, a coronary artery bypass, or surgery on an artery in your head, neck, heart, or legs? No   3. Do you have chest pain with activity? No   4. Do you have a history of  heart failure? No   5. Do you currently have a cold, bronchitis or symptoms of other infection? No   6. Do you have a cough, shortness of breath, or wheezing? No   7. Do you or anyone in your family have previous history of blood clots? No   8. Do you or does anyone in your family have a serious bleeding problem such as prolonged bleeding following surgeries or cuts? No   9. Have you ever had problems with anemia or been told to take iron pills? No   10. Have you had any abnormal blood loss such as black, tarry or bloody stools? No   11. Have you ever had a blood transfusion? No   12. Are you willing to have a blood transfusion if it is medically needed before, during, or after your surgery? Yes   13. Have you or any of your relatives ever had problems with anesthesia? No   14. Do you have sleep apnea, excessive snoring or daytime drowsiness? No   15. Do you have any artifical heart valves or other implanted medical devices like a pacemaker, defibrillator, or continuous glucose monitor? No   16. Do you have artificial joints? No   17. Are you allergic to latex? YES:        Status of Chronic Conditions:  See problem list for active medical problems.  Problems all longstanding and stable, except as noted/documented.  See ROS for pertinent symptoms related to these conditions.    HYPERTENSION - Patient has longstanding history of HTN , currently denies any symptoms referable to elevated blood pressure. Specifically denies chest pain, palpitations, dyspnea, orthopnea, PND or peripheral edema. Blood pressure readings have been in normal range. Current medication regimen is as listed below. Patient denies any side effects of medication.       Review of  Systems  CONSTITUTIONAL: NEGATIVE for fever, chills, change in weight  ENT/MOUTH: NEGATIVE for ear, mouth and throat problems  RESP: NEGATIVE for significant cough or SOB  CV: NEGATIVE for chest pain, palpitations or peripheral edema    Patient Active Problem List    Diagnosis Date Noted     Healthcare maintenance 03/07/2023     Priority: Medium     Colonscopy: never before referred 3/23  PSA: no family Hx       Mild episode of recurrent major depressive disorder (H) 02/28/2023     Priority: Medium     Some days more down. Not a problem now.       Essential hypertension 04/03/2019     Priority: Medium     - BP goal:   - Currently taking meds: losartan 25 mg BID, hydrochlorothiazide 25 mg nightly  - Any side effects: cough with lisinopril  - Last BMP: 2/23- normal  - Any symptoms of HTN such as chest pain, headaches, vision changes, nausea: headaches if doesn't take hydrochlorothiazide at night    BP Readings from Last 6 Encounters:   03/07/23 120/70   02/28/23 124/78   09/19/22 126/74   06/16/22 128/80   01/06/22 128/72   12/15/21 136/80              Morbid obesity (H) 04/03/2019     Priority: Medium     Patient has tried phentermine. As well as another he cannot remember that was a pill. Had side effects. Now started wegovy by previous doctor. Wants ot work on diet and exercise and would maybe consider bariatric int he future. Will let me know        No past medical history on file.  No past surgical history on file.  Current Outpatient Medications   Medication Sig Dispense Refill     benzonatate (TESSALON) 200 MG capsule Take 1 capsule (200 mg) by mouth 3 times daily as needed for cough 30 capsule 1     fluticasone (FLONASE) 50 MCG/ACT nasal spray Spray 1 spray into both nostrils daily 16 g 11     guaiFENesin-codeine (VIRTUSSIN A/C) 100-10 MG/5ML solution 10ml po TID prn 473 mL 1     hydrochlorothiazide (HYDRODIURIL) 25 MG tablet Take 1 tablet (25 mg) by mouth daily 90 tablet 3     losartan (COZAAR) 25 MG tablet  "Take 1 tablet (25 mg) by mouth 2 times daily 180 tablet 3     Semaglutide-Weight Management (WEGOVY) 0.25 MG/0.5ML pen Inject 0.25 mg Subcutaneous once a week For 4 weeks, then increase to 0.5 mg once a week. 2 mL 0     Semaglutide-Weight Management (WEGOVY) 0.5 MG/0.5ML pen Inject 0.5 mg Subcutaneous once a week 2 mL 0       Allergies   Allergen Reactions     Fish Oil [Omega-3 Fatty Acids] Other (See Comments)     Swollen tongue        Social History     Tobacco Use     Smoking status: Never     Smokeless tobacco: Never   Vaping Use     Vaping status: Not on file   Substance Use Topics     Alcohol use: Not on file     Family History   Problem Relation Age of Onset     Hypertension Mother      History   Drug Use Not on file         Objective     /87   Pulse 81   Temp 98.9  F (37.2  C) (Oral)   Resp 14   Ht 1.727 m (5' 8\")   Wt (!) 163.3 kg (360 lb)   SpO2 98%   BMI 54.74 kg/m      Physical Exam    GENERAL APPEARANCE: healthy, alert and no distress     EYES: EOMI,  PERRL     NECK: no adenopathy, no asymmetry, masses, or scars and thyroid normal to palpation     RESP: lungs clear to auscultation - no rales, rhonchi or wheezes     CV: regular rates and rhythm, normal S1 S2, no S3 or S4 and no murmur, click or rub     MS: extremities normal- no gross deformities noted, no evidence of inflammation in joints, FROM in all extremities.     SKIN: no suspicious lesions or rashes     NEURO: Normal strength and tone, sensory exam grossly normal, mentation intact and speech normal     PSYCH: mentation appears normal. and affect normal/bright     LYMPHATICS: No cervical adenopathy    Recent Labs   Lab Test 02/28/23  1546 06/29/21  0849    140   POTASSIUM 3.9 3.8   CR 1.09 0.92   A1C 5.6  --         Diagnostics:  Recent Results (from the past 720 hour(s))   EKG 12-lead, tracing only    Collection Time: 05/05/23  4:37 PM   Result Value Ref Range    Systolic Blood Pressure  mmHg    Diastolic Blood Pressure  mmHg "    Ventricular Rate 78 BPM    Atrial Rate 78 BPM    AZ Interval 162 ms    QRS Duration 94 ms     ms    QTc 440 ms    P Axis 60 degrees    R AXIS -62 degrees    T Axis 0 degrees    Interpretation ECG       Sinus rhythm  Left anterior fascicular block  Inferior infarct (cited on or before 03-JUN-2021)  Abnormal ECG  When compared with ECG of 03-JUN-2021 14:19,  No significant change was found     Basic metabolic panel    Collection Time: 05/05/23  4:49 PM   Result Value Ref Range    Sodium 140 136 - 145 mmol/L    Potassium 4.0 3.4 - 5.3 mmol/L    Chloride 104 98 - 107 mmol/L    Carbon Dioxide (CO2) 24 22 - 29 mmol/L    Anion Gap 12 7 - 15 mmol/L    Urea Nitrogen 13.8 6.0 - 20.0 mg/dL    Creatinine 0.90 0.67 - 1.17 mg/dL    Calcium 9.6 8.6 - 10.0 mg/dL    Glucose 81 70 - 99 mg/dL    GFR Estimate >90 >60 mL/min/1.73m2   CBC with platelets    Collection Time: 05/05/23  4:49 PM   Result Value Ref Range    WBC Count 7.2 4.0 - 11.0 10e3/uL    RBC Count 5.21 4.40 - 5.90 10e6/uL    Hemoglobin 14.7 13.3 - 17.7 g/dL    Hematocrit 44.7 40.0 - 53.0 %    MCV 86 78 - 100 fL    MCH 28.2 26.5 - 33.0 pg    MCHC 32.9 31.5 - 36.5 g/dL    RDW 13.8 10.0 - 15.0 %    Platelet Count 246 150 - 450 10e3/uL        Revised Cardiac Risk Index (RCRI):  The patient has the following serious cardiovascular risks for perioperative complications:   - No serious cardiac risks = 0 points     RCRI Interpretation: 0 points: Class I (very low risk - 0.4% complication rate)           Signed Electronically by: TENA Austin CNP  Copy of this evaluation report is provided to requesting physician.

## 2023-05-08 ENCOUNTER — TELEPHONE (OUTPATIENT)
Dept: FAMILY MEDICINE | Facility: CLINIC | Age: 43
End: 2023-05-08

## 2023-05-08 LAB
ATRIAL RATE - MUSE: 78 BPM
DIASTOLIC BLOOD PRESSURE - MUSE: NORMAL MMHG
INTERPRETATION ECG - MUSE: NORMAL
P AXIS - MUSE: 60 DEGREES
PR INTERVAL - MUSE: 162 MS
QRS DURATION - MUSE: 94 MS
QT - MUSE: 386 MS
QTC - MUSE: 440 MS
R AXIS - MUSE: -62 DEGREES
SYSTOLIC BLOOD PRESSURE - MUSE: NORMAL MMHG
T AXIS - MUSE: 0 DEGREES
VENTRICULAR RATE- MUSE: 78 BPM

## 2023-05-08 NOTE — TELEPHONE ENCOUNTER
05/08/23  General Call      Reason for Call:  Wegovy Refill    What are your questions or concerns:  Pt has not been able to get his Wegovy. The pharmacy keeps telling pt they have not received the prescription. Pt wants it sent to [facility] in [place]. Pt is currently out.    Date of last appointment with provider: 05/05/23    Could we send this information to you in [facility] or would you prefer to receive a phone call?:   Patient would prefer a phone call   Okay to leave a detailed message?: Yes at Home number on file [phone] (home)

## 2023-05-09 ENCOUNTER — MYC MEDICAL ADVICE (OUTPATIENT)
Dept: FAMILY MEDICINE | Facility: CLINIC | Age: 43
End: 2023-05-09
Payer: COMMERCIAL

## 2023-05-09 NOTE — TELEPHONE ENCOUNTER
MyChart response sent for medication substitution that was done at patients last office visit.    RYDER Hernandez  [facility]

## 2023-05-10 NOTE — TELEPHONE ENCOUNTER
05/10/23  Pt called to let PCP know that Ozempic is not covered by his insurance. Only the Wegovy is covered. Pt states he is past due for this injection by 3 days. Pt frustrated as he called yesterday and did not receive a call back.   Pt is requesting Wegovy. Please advise. Ok to leave detailed message.  Ally

## 2023-05-15 ENCOUNTER — ANESTHESIA EVENT (OUTPATIENT)
Dept: SURGERY | Facility: CLINIC | Age: 43
End: 2023-05-15
Payer: COMMERCIAL

## 2023-05-16 ENCOUNTER — ANESTHESIA (OUTPATIENT)
Dept: SURGERY | Facility: CLINIC | Age: 43
End: 2023-05-16
Payer: COMMERCIAL

## 2023-05-16 ENCOUNTER — HOSPITAL ENCOUNTER (OUTPATIENT)
Facility: CLINIC | Age: 43
Discharge: HOME | End: 2023-05-16
Attending: INTERNAL MEDICINE | Admitting: INTERNAL MEDICINE
Payer: COMMERCIAL

## 2023-05-16 VITALS
SYSTOLIC BLOOD PRESSURE: 122 MMHG | BODY MASS INDEX: 47.74 KG/M2 | HEART RATE: 60 BPM | WEIGHT: 315 LBS | HEIGHT: 68 IN | RESPIRATION RATE: 16 BRPM | TEMPERATURE: 98.2 F | DIASTOLIC BLOOD PRESSURE: 70 MMHG | OXYGEN SATURATION: 100 %

## 2023-05-16 LAB — COLONOSCOPY: NORMAL

## 2023-05-16 PROCEDURE — 370N000017 HC ANESTHESIA TECHNICAL FEE, PER MIN: Performed by: INTERNAL MEDICINE

## 2023-05-16 PROCEDURE — 710N000012 HC RECOVERY PHASE 2, PER MINUTE: Performed by: INTERNAL MEDICINE

## 2023-05-16 PROCEDURE — 258N000003 HC RX IP 258 OP 636: Performed by: ANESTHESIOLOGY

## 2023-05-16 PROCEDURE — 250N000011 HC RX IP 250 OP 636: Performed by: NURSE ANESTHETIST, CERTIFIED REGISTERED

## 2023-05-16 PROCEDURE — 360N000075 HC SURGERY LEVEL 2, PER MIN: Performed by: INTERNAL MEDICINE

## 2023-05-16 PROCEDURE — 999N000141 HC STATISTIC PRE-PROCEDURE NURSING ASSESSMENT: Performed by: INTERNAL MEDICINE

## 2023-05-16 RX ORDER — PROCHLORPERAZINE MALEATE 10 MG
10 TABLET ORAL EVERY 6 HOURS PRN
Status: DISCONTINUED | OUTPATIENT
Start: 2023-05-16 | End: 2023-05-16 | Stop reason: HOSPADM

## 2023-05-16 RX ORDER — PROPOFOL 10 MG/ML
INJECTION, EMULSION INTRAVENOUS CONTINUOUS PRN
Status: DISCONTINUED | OUTPATIENT
Start: 2023-05-16 | End: 2023-05-16

## 2023-05-16 RX ORDER — ONDANSETRON 2 MG/ML
4 INJECTION INTRAMUSCULAR; INTRAVENOUS EVERY 6 HOURS PRN
Status: DISCONTINUED | OUTPATIENT
Start: 2023-05-16 | End: 2023-05-16 | Stop reason: HOSPADM

## 2023-05-16 RX ORDER — NALOXONE HYDROCHLORIDE 0.4 MG/ML
0.4 INJECTION, SOLUTION INTRAMUSCULAR; INTRAVENOUS; SUBCUTANEOUS
Status: DISCONTINUED | OUTPATIENT
Start: 2023-05-16 | End: 2023-05-16 | Stop reason: HOSPADM

## 2023-05-16 RX ORDER — NALOXONE HYDROCHLORIDE 0.4 MG/ML
0.2 INJECTION, SOLUTION INTRAMUSCULAR; INTRAVENOUS; SUBCUTANEOUS
Status: DISCONTINUED | OUTPATIENT
Start: 2023-05-16 | End: 2023-05-16 | Stop reason: HOSPADM

## 2023-05-16 RX ORDER — PROPOFOL 10 MG/ML
INJECTION, EMULSION INTRAVENOUS PRN
Status: DISCONTINUED | OUTPATIENT
Start: 2023-05-16 | End: 2023-05-16

## 2023-05-16 RX ORDER — LIDOCAINE 40 MG/G
CREAM TOPICAL
Status: DISCONTINUED | OUTPATIENT
Start: 2023-05-16 | End: 2023-05-16 | Stop reason: HOSPADM

## 2023-05-16 RX ORDER — ONDANSETRON 4 MG/1
4 TABLET, ORALLY DISINTEGRATING ORAL EVERY 6 HOURS PRN
Status: DISCONTINUED | OUTPATIENT
Start: 2023-05-16 | End: 2023-05-16 | Stop reason: HOSPADM

## 2023-05-16 RX ORDER — OXYCODONE HYDROCHLORIDE 5 MG/1
5 TABLET ORAL
Status: DISCONTINUED | OUTPATIENT
Start: 2023-05-16 | End: 2023-05-16 | Stop reason: HOSPADM

## 2023-05-16 RX ORDER — SODIUM CHLORIDE, SODIUM LACTATE, POTASSIUM CHLORIDE, CALCIUM CHLORIDE 600; 310; 30; 20 MG/100ML; MG/100ML; MG/100ML; MG/100ML
INJECTION, SOLUTION INTRAVENOUS CONTINUOUS
Status: DISCONTINUED | OUTPATIENT
Start: 2023-05-16 | End: 2023-05-16 | Stop reason: HOSPADM

## 2023-05-16 RX ORDER — ONDANSETRON 2 MG/ML
4 INJECTION INTRAMUSCULAR; INTRAVENOUS
Status: DISCONTINUED | OUTPATIENT
Start: 2023-05-16 | End: 2023-05-16 | Stop reason: HOSPADM

## 2023-05-16 RX ORDER — FLUMAZENIL 0.1 MG/ML
0.2 INJECTION, SOLUTION INTRAVENOUS
Status: DISCONTINUED | OUTPATIENT
Start: 2023-05-16 | End: 2023-05-16 | Stop reason: HOSPADM

## 2023-05-16 RX ORDER — OXYCODONE HYDROCHLORIDE 5 MG/1
10 TABLET ORAL
Status: DISCONTINUED | OUTPATIENT
Start: 2023-05-16 | End: 2023-05-16 | Stop reason: HOSPADM

## 2023-05-16 RX ORDER — ONDANSETRON 2 MG/ML
INJECTION INTRAMUSCULAR; INTRAVENOUS PRN
Status: DISCONTINUED | OUTPATIENT
Start: 2023-05-16 | End: 2023-05-16

## 2023-05-16 RX ADMIN — PROPOFOL 50 MG: 10 INJECTION, EMULSION INTRAVENOUS at 07:24

## 2023-05-16 RX ADMIN — PROPOFOL 50 MG: 10 INJECTION, EMULSION INTRAVENOUS at 07:22

## 2023-05-16 RX ADMIN — ONDANSETRON 4 MG: 2 INJECTION INTRAMUSCULAR; INTRAVENOUS at 07:25

## 2023-05-16 RX ADMIN — SODIUM CHLORIDE, POTASSIUM CHLORIDE, SODIUM LACTATE AND CALCIUM CHLORIDE: 600; 310; 30; 20 INJECTION, SOLUTION INTRAVENOUS at 06:59

## 2023-05-16 RX ADMIN — PROPOFOL 150 MCG/KG/MIN: 10 INJECTION, EMULSION INTRAVENOUS at 07:24

## 2023-05-16 ASSESSMENT — ACTIVITIES OF DAILY LIVING (ADL): ADLS_ACUITY_SCORE: 35

## 2023-05-16 NOTE — ANESTHESIA POSTPROCEDURE EVALUATION
Patient: Ilan Matthew    Procedure: Procedure(s):  COLONOSCOPY       Anesthesia Type:  MAC    Note:  Disposition: Outpatient   Postop Pain Control: Uneventful            Sign Out: Well controlled pain   PONV: No   Neuro/Psych: Uneventful            Sign Out: Acceptable/Baseline neuro status   Airway/Respiratory: Uneventful            Sign Out: Acceptable/Baseline resp. status   CV/Hemodynamics: Uneventful            Sign Out: Acceptable CV status; No obvious hypovolemia; No obvious fluid overload   Other NRE: NONE   DID A NON-ROUTINE EVENT OCCUR? No           Last vitals:  Vitals Value Taken Time   /70 05/16/23 0800   Temp 36.8  C (98.2  F) 05/16/23 0739   Pulse 64 05/16/23 0807   Resp 16 05/16/23 0750   SpO2 99 % 05/16/23 0807   Vitals shown include unvalidated device data.    Electronically Signed By: Estuardo Dinero MD  May 16, 2023  8:20 AM

## 2023-05-16 NOTE — ANESTHESIA PREPROCEDURE EVALUATION
Anesthesia Pre-Procedure Evaluation    Patient: Ilan Matthew   MRN: [id number] : 1980        Procedure : Procedure(s):  COLONOSCOPY          Past Medical History:   Diagnosis Date     Arthritis      Hypertension      Obese       History reviewed. No pertinent surgical history.   Allergies   Allergen Reactions     Fish Oil [Omega-3 Fatty Acids] Other (See Comments)     Swollen tongue      Social History     Tobacco Use     Smoking status: Never     Smokeless tobacco: Never   Vaping Use     Vaping status: Never Used   Substance Use Topics     Alcohol use: Never      Wt Readings from Last 1 Encounters:   23 (!) 159.2 kg (351 lb)        Anesthesia Evaluation   Pt has not had prior anesthetic         ROS/MED HX  ENT/Pulmonary:     (+) DAMARI risk factors, hypertension, obese,     Neurologic:  - neg neurologic ROS     Cardiovascular:     (+) hypertension-----    METS/Exercise Tolerance:     Hematologic:  - neg hematologic  ROS     Musculoskeletal:  - neg musculoskeletal ROS     GI/Hepatic:     (+) GERD,     Renal/Genitourinary:  - neg Renal ROS     Endo:     (+) Obesity (BMI 53),     Psychiatric/Substance Use:  - neg psychiatric ROS     Infectious Disease:  - neg infectious disease ROS     Malignancy:       Other:            Physical Exam    Airway        Mallampati: III   TM distance: > 3 FB   Neck ROM: full   Mouth opening: > 3 cm    Respiratory Devices and Support         Dental       (+) Minor Abnormalities - some fillings, tiny chips      Cardiovascular   cardiovascular exam normal       Rhythm and rate: regular and normal     Pulmonary   pulmonary exam normal        breath sounds clear to auscultation           OUTSIDE LABS:  CBC:   Lab Results   Component Value Date    WBC 7.2 2023    HGB 14.7 2023    HCT 44.7 2023     2023     BMP:   Lab Results   Component Value Date     2023     2023    POTASSIUM 4.0 2023    POTASSIUM 3.9 2023     CHLORIDE 104 05/05/2023    CHLORIDE 103 02/28/2023    CO2 24 05/05/2023    CO2 26 02/28/2023    BUN 13.8 05/05/2023    BUN 15.7 02/28/2023    CR 0.90 05/05/2023    CR 1.09 02/28/2023    GLC 81 05/05/2023     (H) 02/28/2023     COAGS: No results found for: PTT, INR, FIBR  POC: No results found for: BGM, HCG, HCGS  HEPATIC:   Lab Results   Component Value Date    ALBUMIN 3.6 06/29/2021    PROTTOTAL 6.8 06/29/2021    ALT 40 02/28/2023    AST 39 02/28/2023    ALKPHOS 115 06/29/2021    BILITOTAL 0.4 06/29/2021     OTHER:   Lab Results   Component Value Date    A1C 5.6 02/28/2023    LAWANDA 9.6 05/05/2023    TSH 1.65 02/28/2023       Anesthesia Plan    ASA Status:  3      Anesthesia Type: MAC.              Consents    Anesthesia Plan(s) and associated risks, benefits, and realistic alternatives discussed. Questions answered and patient/representative(s) expressed understanding.    - Discussed:     - Discussed with:  Patient         Postoperative Care       PONV prophylaxis: Ondansetron (or other 5HT-3)     Comments:                Estuardo Dinero MD

## 2023-05-16 NOTE — ANESTHESIA CARE TRANSFER NOTE
Patient: Ilan Matthew    Procedure: Procedure(s):  COLONOSCOPY       Diagnosis: Screening for colon cancer [Z12.11]  Diagnosis Additional Information: No value filed.    Anesthesia Type:   MAC     Note:    Oropharynx: oropharynx clear of all foreign objects and spontaneously breathing  Level of Consciousness: awake  Oxygen Supplementation: face mask  Level of Supplemental Oxygen (L/min / FiO2): 6  Independent Airway: airway patency satisfactory and stable  Dentition: dentition unchanged  Vital Signs Stable: post-procedure vital signs reviewed and stable  Report to RN Given: handoff report given  Patient transferred to: Phase II    Handoff Report: Identifed the Patient, Identified the Reponsible Provider, Reviewed the pertinent medical history, Discussed the surgical course, Reviewed Intra-OP anesthesia mangement and issues during anesthesia, Set expectations for post-procedure period and Allowed opportunity for questions and acknowledgement of understanding      Vitals:  Vitals Value Taken Time   /57 05/16/23 0739   Temp 36.8  C (98.2  F) 05/16/23 0739   Pulse 76 05/16/23 0740   Resp 16 05/16/23 0739   SpO2 100 % 05/16/23 0740   Vitals shown include unvalidated device data.    Electronically Signed By: TENA Brooks CRNA  May 16, 2023  7:41 AM

## 2023-05-16 NOTE — H&P
"  GASTROENTEROLOGY PRE-PROCEDURAL HISTORY AND PHYSICAL     INDICATION FOR PROCEDURE   Colon cancer screening     HISTORY    Reviewed and no change.     PHYSICAL EXAMINATION     Vitals Blood pressure 139/67, pulse 75, temperature 98.2  F (36.8  C), temperature source Temporal, resp. rate 16, height 1.727 m (5' 8\"), weight (!) 159.2 kg (351 lb), SpO2 94 %.          Physical Exam   General: awake, alert, oriented times three    Cardiovascular: RRR, no edema    Airway: Normal    Chest: lungs are clear to auscultation bilaterally    Abdomen: soft, non-tender        PREVIOUS REACTION TO SEDATION/ANESTHESIA    None     SEDATION PLAN BASED ON ASSESSMENT   Per Anesthesia     ASA CLASSIFICATION   ASA Classification: 2     MALLAMPATI SCORE     Class II      IMPRESSION   Patient deemed adequate candidate for anesthesia.     PLANNED PROCEDURE   Colonoscopy     Nasir Varma MD [facility]  I appreciate the opportunity to participate in the care of this patient.   Please feel free to call me with any questions or concerns.     "

## 2023-07-01 DIAGNOSIS — I10 ESSENTIAL HYPERTENSION: ICD-10-CM

## 2023-07-01 RX ORDER — LOSARTAN POTASSIUM 25 MG/1
TABLET ORAL
Qty: 180 TABLET | Refills: 3 | OUTPATIENT
Start: 2023-07-01

## 2023-07-02 NOTE — TELEPHONE ENCOUNTER
Should have refills on file    Last Written Prescription Date:  2/28/23  Last Fill Quantity: 180,  # refills: 3     Nury Osorio RN 07/01/23 10:57 PM

## 2023-07-30 ENCOUNTER — HEALTH MAINTENANCE LETTER (OUTPATIENT)
Age: 43
End: 2023-07-30

## 2023-10-05 ENCOUNTER — ALLIED HEALTH/NURSE VISIT (OUTPATIENT)
Dept: FAMILY MEDICINE | Facility: CLINIC | Age: 43
End: 2023-10-05
Payer: COMMERCIAL

## 2023-10-05 DIAGNOSIS — Z23 ENCOUNTER FOR IMMUNIZATION: Primary | ICD-10-CM

## 2023-10-05 PROCEDURE — 90471 IMMUNIZATION ADMIN: CPT

## 2023-10-05 PROCEDURE — 90686 IIV4 VACC NO PRSV 0.5 ML IM: CPT

## 2023-10-05 PROCEDURE — 99207 PR NO CHARGE NURSE ONLY: CPT

## 2024-01-15 DIAGNOSIS — I10 ESSENTIAL HYPERTENSION: ICD-10-CM

## 2024-01-15 RX ORDER — LOSARTAN POTASSIUM 25 MG/1
25 TABLET ORAL 2 TIMES DAILY
Qty: 180 TABLET | Refills: 0 | Status: SHIPPED | OUTPATIENT
Start: 2024-01-15 | End: 2024-05-30

## 2024-02-19 ENCOUNTER — TELEPHONE (OUTPATIENT)
Dept: FAMILY MEDICINE | Facility: CLINIC | Age: 44
End: 2024-02-19

## 2024-02-19 DIAGNOSIS — Z31.41 ENCOUNTER FOR SEMEN ANALYSIS: Primary | ICD-10-CM

## 2024-02-19 NOTE — TELEPHONE ENCOUNTER
Order/Referral Request    Who is requesting: Patient    Orders being requested: referral for [facility] for a semen analysis.    Reason service is needed/diagnosis: semen analysis    When are orders needed by: By Wednesday 02/21/2024    Has this been discussed with Provider: Yes    Does patient have a preference on a Group/Provider/Facility? [facility]    Does patient have an appointment scheduled?: Yes: 02/21/2024    Where to send orders: Fax [phone]    Could we send this information to you in [employer] or would you prefer to receive a phone call?:   Please call Pt and let him know when the referral has been faxed.     Xochilt Kimball

## 2024-05-30 ENCOUNTER — MYC REFILL (OUTPATIENT)
Dept: FAMILY MEDICINE | Facility: CLINIC | Age: 44
End: 2024-05-30
Payer: COMMERCIAL

## 2024-05-30 DIAGNOSIS — I10 ESSENTIAL HYPERTENSION: ICD-10-CM

## 2024-05-30 RX ORDER — LOSARTAN POTASSIUM 25 MG/1
25 TABLET ORAL 2 TIMES DAILY
Qty: 180 TABLET | Refills: 0 | Status: SHIPPED | OUTPATIENT
Start: 2024-05-30 | End: 2024-09-17

## 2024-05-30 RX ORDER — HYDROCHLOROTHIAZIDE 25 MG/1
25 TABLET ORAL DAILY
Qty: 30 TABLET | Refills: 0 | Status: SHIPPED | OUTPATIENT
Start: 2024-05-30 | End: 2024-07-01

## 2024-05-30 NOTE — TELEPHONE ENCOUNTER
Medication Question or Refill    Contacts         Type Contact Phone/Fax    05/30/2024 12:52 PM CDT Phone (Incoming) Ilan Matthew (Self) [phone] (H)            What medication are you calling about (include dose and sig)?: 25mg Blood pressure medication    Preferred Pharmacy:   [address] [address]  Phone: [phone] Fax: [phone]      Controlled Substance Agreement on file:   CSA -- Patient Level:    CSA: None found at the patient level.       Who prescribed the medication?: rBia Casey    Do you need a refill? Yes    When did you use the medication last? daily    Patient offered an appointment? No    Do you have any questions or concerns?  Yes: Has only 1 pill left so he need the refill as soon as possible      Could we send this information to you in [employer] or would you prefer to receive a phone call?:  Both [facility] [employer] and phone  Okay to leave a detailed message?: Yes at Cell number on file:    Telephone Information:   Mobile [phone]

## 2024-07-01 ENCOUNTER — MYC MEDICAL ADVICE (OUTPATIENT)
Dept: FAMILY MEDICINE | Facility: CLINIC | Age: 44
End: 2024-07-01
Payer: COMMERCIAL

## 2024-07-01 DIAGNOSIS — I10 ESSENTIAL HYPERTENSION: ICD-10-CM

## 2024-07-01 RX ORDER — HYDROCHLOROTHIAZIDE 25 MG/1
25 TABLET ORAL DAILY
Qty: 30 TABLET | Refills: 0 | Status: SHIPPED | OUTPATIENT
Start: 2024-07-01 | End: 2024-09-05

## 2024-08-20 ENCOUNTER — OFFICE VISIT (OUTPATIENT)
Dept: INTERNAL MEDICINE | Facility: CLINIC | Age: 44
End: 2024-08-20
Payer: COMMERCIAL

## 2024-08-20 VITALS
SYSTOLIC BLOOD PRESSURE: 136 MMHG | WEIGHT: 315 LBS | HEART RATE: 72 BPM | RESPIRATION RATE: 16 BRPM | TEMPERATURE: 97.3 F | DIASTOLIC BLOOD PRESSURE: 87 MMHG | OXYGEN SATURATION: 97 % | BODY MASS INDEX: 57.05 KG/M2

## 2024-08-20 DIAGNOSIS — H66.91 ACUTE RIGHT OTITIS MEDIA: Primary | ICD-10-CM

## 2024-08-20 PROCEDURE — 99203 OFFICE O/P NEW LOW 30 MIN: CPT | Performed by: INTERNAL MEDICINE

## 2024-08-20 ASSESSMENT — PATIENT HEALTH QUESTIONNAIRE - PHQ9
SUM OF ALL RESPONSES TO PHQ QUESTIONS 1-9: 2
SUM OF ALL RESPONSES TO PHQ QUESTIONS 1-9: 2
10. IF YOU CHECKED OFF ANY PROBLEMS, HOW DIFFICULT HAVE THESE PROBLEMS MADE IT FOR YOU TO DO YOUR WORK, TAKE CARE OF THINGS AT HOME, OR GET ALONG WITH OTHER PEOPLE: NOT DIFFICULT AT ALL

## 2024-08-20 NOTE — PATIENT INSTRUCTIONS
Augmentin twice daily for 7 days.  Take Probiotic ( Culuturelle or Align ) while on antibiotic.    Take Tylenol and Advil for pain management.

## 2024-08-20 NOTE — PROGRESS NOTES
Assessment & Plan     Acute right otitis media  Acute, severe, 8/10 right ear pain for 3-4 days with no drainage, bleeding, cold symptoms or fever. He had ear infection in the past.  On the exam, right TM is red and bulging, no blood in the ear canal.   He will take Augmentin for a week.    - amoxicillin-clavulanate (AUGMENTIN) 875-125 MG tablet; Take 1 tablet by mouth 2 times daily      Patient Instructions   Augmentin twice daily for 7 days.  Take Probiotic ( Culuturelle or Align ) while on antibiotic.    Take Tylenol and Advil for pain management.           Cornelia Talavera is a 44 year old, presenting for the following health issues:  Ear Problem (R Ear Pain x3 Day's )      8/20/2024     1:52 PM   Additional Questions   Roomed by Cassia     Ear Problem    History of Present Illness       Reason for visit:  Ear pain  Symptom onset:  1-3 days ago  Symptoms include:  Sharp pain and pressure  Symptom intensity:  Severe  Symptom progression:  Worsening  Had these symptoms before:  Yes  Has tried/received treatment for these symptoms:  Yes  Previous treatment was successful:  Yes  Prior treatment description:  Antiobiotic and ear flush/cleaning  What makes it worse:   no  What makes it better:  No    He eats 2-3 servings of fruits and vegetables daily.He consumes 1 sweetened beverage(s) daily.He exercises with enough effort to increase his heart rate 20 to 29 minutes per day.  He exercises with enough effort to increase his heart rate 3 or less days per week. He is missing 1 dose(s) of medications per week.                     Objective    /87   Pulse 72   Temp 97.3  F (36.3  C)   Resp 16   Wt (!) 170.2 kg (375 lb 3.2 oz)   SpO2 97%   BMI 57.05 kg/m    Body mass index is 57.05 kg/m .  Physical Exam   Constitutional:  oriented to person, place, and time, appears well-nourished. No distress.   HENT:   Head: Normocephalic.   Mouth/Throat: Oropharynx is clear and moist.   Eyes: Conjunctivae are normal.    Neck: Neck supple.   Cardiovascular: Normal rate, regular rhythm and normal heart sounds.    Pulmonary/Chest: Effort normal and breath sounds normal  Neurological: alert and oriented to person, place, and time. Skin: Skin is warm.   Psychiatric: normal mood and affect.             Signed Electronically by: Stone East MD

## 2024-09-04 DIAGNOSIS — I10 ESSENTIAL HYPERTENSION: ICD-10-CM

## 2024-09-05 RX ORDER — HYDROCHLOROTHIAZIDE 25 MG/1
25 TABLET ORAL DAILY
Qty: 30 TABLET | Refills: 0 | Status: SHIPPED | OUTPATIENT
Start: 2024-09-05 | End: 2024-09-17

## 2024-09-17 ENCOUNTER — OFFICE VISIT (OUTPATIENT)
Dept: FAMILY MEDICINE | Facility: CLINIC | Age: 44
End: 2024-09-17
Payer: COMMERCIAL

## 2024-09-17 ENCOUNTER — TELEPHONE (OUTPATIENT)
Dept: FAMILY MEDICINE | Facility: CLINIC | Age: 44
End: 2024-09-17

## 2024-09-17 VITALS
HEART RATE: 73 BPM | SYSTOLIC BLOOD PRESSURE: 121 MMHG | RESPIRATION RATE: 14 BRPM | OXYGEN SATURATION: 98 % | HEIGHT: 68 IN | WEIGHT: 315 LBS | TEMPERATURE: 98.5 F | DIASTOLIC BLOOD PRESSURE: 80 MMHG | BODY MASS INDEX: 47.74 KG/M2

## 2024-09-17 DIAGNOSIS — E66.01 MORBID OBESITY (H): ICD-10-CM

## 2024-09-17 DIAGNOSIS — F33.0 MILD EPISODE OF RECURRENT MAJOR DEPRESSIVE DISORDER (H): ICD-10-CM

## 2024-09-17 DIAGNOSIS — E66.01 MORBID OBESITY (H): Primary | ICD-10-CM

## 2024-09-17 DIAGNOSIS — R73.03 PREDIABETES: ICD-10-CM

## 2024-09-17 DIAGNOSIS — E78.5 DYSLIPIDEMIA: ICD-10-CM

## 2024-09-17 DIAGNOSIS — Z12.5 SCREENING FOR PROSTATE CANCER: ICD-10-CM

## 2024-09-17 DIAGNOSIS — I10 ESSENTIAL HYPERTENSION: ICD-10-CM

## 2024-09-17 DIAGNOSIS — Z00.00 VISIT FOR PREVENTIVE HEALTH EXAMINATION: Primary | ICD-10-CM

## 2024-09-17 LAB — HBA1C MFR BLD: 5.7 % (ref 0–5.6)

## 2024-09-17 PROCEDURE — 99396 PREV VISIT EST AGE 40-64: CPT | Performed by: NURSE PRACTITIONER

## 2024-09-17 PROCEDURE — G0103 PSA SCREENING: HCPCS | Performed by: NURSE PRACTITIONER

## 2024-09-17 PROCEDURE — 36415 COLL VENOUS BLD VENIPUNCTURE: CPT | Performed by: NURSE PRACTITIONER

## 2024-09-17 PROCEDURE — 83036 HEMOGLOBIN GLYCOSYLATED A1C: CPT | Performed by: NURSE PRACTITIONER

## 2024-09-17 PROCEDURE — 80053 COMPREHEN METABOLIC PANEL: CPT | Performed by: NURSE PRACTITIONER

## 2024-09-17 PROCEDURE — 80061 LIPID PANEL: CPT | Performed by: NURSE PRACTITIONER

## 2024-09-17 PROCEDURE — 99214 OFFICE O/P EST MOD 30 MIN: CPT | Mod: 25 | Performed by: NURSE PRACTITIONER

## 2024-09-17 RX ORDER — LOSARTAN POTASSIUM 25 MG/1
25 TABLET ORAL 2 TIMES DAILY
Qty: 180 TABLET | Refills: 3 | Status: SHIPPED | OUTPATIENT
Start: 2024-09-17

## 2024-09-17 RX ORDER — HYDROCHLOROTHIAZIDE 25 MG/1
25 TABLET ORAL DAILY
Qty: 90 TABLET | Refills: 3 | Status: SHIPPED | OUTPATIENT
Start: 2024-09-17

## 2024-09-17 NOTE — PROGRESS NOTES
Preventive Care Visit  [facility] TENA Rucker CNP, Family Medicine  Sep 17, 2024      Assessment & Plan     Morbid obesity (H)  **  - Med Therapy Management Referral  - rosuvastatin (CRESTOR) 5 MG tablet  Dispense: 90 tablet; Refill: 3    Essential hypertension  **  - Comprehensive metabolic panel (BMP + Alb, Alk Phos, ALT, AST, Total. Bili, TP)  - Lipid Profile (Chol, Trig, HDL, LDL calc)  - hydrochlorothiazide (HYDRODIURIL) 25 MG tablet  Dispense: 90 tablet; Refill: 3  - losartan (COZAAR) 25 MG tablet  Dispense: 180 tablet; Refill: 3  - Comprehensive metabolic panel (BMP + Alb, Alk Phos, ALT, AST, Total. Bili, TP)  - Lipid Profile (Chol, Trig, HDL, LDL calc)    Prediabetes  **  - Hemoglobin A1c  - Hemoglobin A1c    Mild episode of recurrent major depressive disorder (H24)  **  - Adult Mental Health  Referral    Screening for prostate cancer  **  - PSA, screen  - PSA, screen    Visit for preventive health examination  **    Dyslipidemia  **  - rosuvastatin (CRESTOR) 5 MG tablet  Dispense: 90 tablet; Refill: 3    -His  A1c is up a little bit from last year.  Now more within the prediabetic, insulin resistance range.  Wegovy ordered, believe that this medication will be beneficial.  He is willing to follow MTM for evaluation.  Medication has been ordered to [place] pharmacy.  He does have risk factors.   -Blood pressure is stable on losartan and hydrochlorothiazide.  He can stay on his medications.  Medications refilled.  I discussed that yearly follow-up visits are needed to check for kidney function.  He verbalized understanding.   -Behavioral forms reviewed.  Patient has been under more stress.  Behavioral health therapy ordered upon patient's request.  No suicidal or homicidal thoughts or plans noted'  -It appears that history of low testosterone, has seen urology.  I suggest that he continue to follow-up with urology and continue with treatments.   -Your ASCVD risk  "score over the next 10 years is at 7.2%.  Your total cholesterol is stable at 220, but your HDL is low at 39.  My recommendation is that you start a statin medication. I will just order you a low dose, and then recheck value next year.   This medication will help decrease your LDL, and help prevent against a heart attack or stroke in the future.  If you disagree, please let me know.  Other ways to help decrease your cholesterol is by eating a diet that is low in saturated fat, such as the Mediterranean diet, and working on regular exercise.  The goal is 150 minutes of moderate activity per week.   -Your other labs are stable.  Your A1c bumped up a little bit, but this will get back under normal values once Wegovy is started.   -If you have any questions please let me know.     - The 10-year ASCVD risk score (Eliseo DUENAS, et al., 2019) is: 7.2%    Values used to calculate the score:      Age: 44 years      Sex: Male      Is Non- : Yes      Diabetic: No      Tobacco smoker: No      Systolic Blood Pressure: 121 mmHg      Is BP treated: Yes      HDL Cholesterol: 39 mg/dL      Total Cholesterol: 220 mg/dL        BMI  Estimated body mass index is 56.67 kg/m  as calculated from the following:    Height as of this encounter: 1.721 m (5' 7.75\").    Weight as of this encounter: 167.8 kg (370 lb).   Weight management plan: Patient referred to endocrine and/or weight management specialty Discussed healthy diet and exercise guidelines    Counseling  Appropriate preventive services were addressed with this patient via screening, questionnaire, or discussion as appropriate for fall prevention, nutrition, physical activity, Tobacco-use cessation, social engagement, weight loss and cognition.  Checklist reviewing preventive services available has been given to the patient.  Reviewed patient's diet, addressing concerns and/or questions.   He is at risk for lack of exercise and has been provided with information " to increase physical activity for the benefit of his well-being.       Cornelia Talavera is a 44 year old, presenting for the following:  Physical    Over the last 2-3 months, lots of changes in family, has been noticing body aches everywhere, had an ear infection, no ear pain bilateral, but feels irritated, no drainage, no itching, etc.   - saw urology in past, noted lower testosterone and was on medication. He stopped.   -Feeling depressed.  He would like to see behavioral health for therapy he denied any suicidal homicidal thoughts or plans.   -He stop Wegovy because of medication was in short supply.  He would like to restart the medication.  He did not notice any side effects when he was on the medication.  He is a [employer] employee.   -He denied any chest pain.  He denied any symptoms of sleep apnea. Denied sob.         9/17/2024     1:20 PM   Additional Questions   Roomed by Melo VILLEDA          9/17/2024   General Health   How would you rate your overall physical health? Good   Feel stress (tense, anxious, or unable to sleep) To some extent      (!) STRESS CONCERN      9/17/2024   Nutrition   Three or more servings of calcium each day? (!) NO   Diet: Other   If other, please elaborate: fasting   How many servings of fruit and vegetables per day? 4 or more   How many sweetened beverages each day? (!) 2            9/17/2024   Exercise   Days per week of moderate/strenous exercise 2 days   Average minutes spent exercising at this level 20 min      (!) EXERCISE CONCERN      9/17/2024   Social Factors   Frequency of gathering with friends or relatives Twice a week   Worry food won't last until get money to buy more No   Food not last or not have enough money for food? No   Do you have housing? (Housing is defined as stable permanent housing and does not include staying ouside in a car, in a tent, in an abandoned building, in an overnight shelter, or couch-surfing.) Yes   Are you worried about losing your  "housing? No   Lack of transportation? No   Unable to get utilities (heat,electricity)? No            9/17/2024   Dental   Dentist two times every year? Yes            9/17/2024   TB Screening   Were you born outside of the [place]? No            Today's PHQ-9 Score:       8/20/2024     1:44 PM   PHQ-9 SCORE   PHQ-9 Total Score MyChart 2 (Minimal depression)   PHQ-9 Total Score 2           9/17/2024   Substance Use   Alcohol more than 3/day or more than 7/wk No   Do you use any other substances recreationally? No        Social History     Tobacco Use    Smoking status: Never    Smokeless tobacco: Never   Vaping Use    Vaping status: Never Used   Substance Use Topics    Alcohol use: Never    Drug use: Never           9/17/2024   STI Screening   New sexual partner(s) since last STI/HIV test? No      ASCVD Risk   The 10-year ASCVD risk score (Eliseo DUENAS, et al., 2019) is: 7.1%    Values used to calculate the score:      Age: 44 years      Sex: Male      Is Non- : Yes      Diabetic: No      Tobacco smoker: No      Systolic Blood Pressure: 121 mmHg      Is BP treated: Yes      HDL Cholesterol: 37 mg/dL      Total Cholesterol: 203 mg/dL        9/17/2024   Contraception/Family Planning   Questions about contraception or family planning No           Reviewed and updated as needed this visit by Provider                    Past Medical History:   Diagnosis Date    Arthritis     Hypertension     Obese      Past Surgical History:   Procedure Laterality Date    COLONOSCOPY N/A 5/16/2023    Procedure: COLONOSCOPY;  Surgeon: Nasir Varma MD;  Location: [facility] OR     Labs reviewed in EPIC      Review of Systems  Constitutional, HEENT, cardiovascular, pulmonary, gi and gu systems are negative, except as otherwise noted.     Objective    Exam  /80   Pulse 73   Temp 98.5  F (36.9  C) (Oral)   Resp 14   Ht 1.721 m (5' 7.75\")   Wt (!) 167.8 kg (370 lb)   SpO2 98%   BMI 56.67 kg/m   " "  Estimated body mass index is 56.67 kg/m  as calculated from the following:    Height as of this encounter: 1.721 m (5' 7.75\").    Weight as of this encounter: 167.8 kg (370 lb).    Physical Exam  GENERAL: alert and no distress  EYES: Eyes grossly normal to inspection, PERRL and conjunctivae and sclerae normal  HENT: ear canals and TM's normal, nose and mouth without ulcers or lesions  NECK: no adenopathy, no asymmetry, masses, or scars  RESP: lungs clear to auscultation - no rales, rhonchi or wheezes  CV: regular rate and rhythm, normal S1 S2, no S3 or S4, no murmur, click or rub, no peripheral edema  MS: no gross musculoskeletal defects noted, no edema  SKIN: no suspicious lesions or rashes  NEURO: Normal strength and tone, mentation intact and speech normal  PSYCH: mentation appears normal, affect normal/bright  LYMPH: no cervical, supraclavicular, axillary, or inguinal adenopathy        Signed Electronically by: TENA Austin CNP    "

## 2024-09-18 LAB
ALBUMIN SERPL BCG-MCNC: 4.1 G/DL (ref 3.5–5.2)
ALP SERPL-CCNC: 102 U/L (ref 40–150)
ALT SERPL W P-5'-P-CCNC: 25 U/L (ref 0–70)
ANION GAP SERPL CALCULATED.3IONS-SCNC: 11 MMOL/L (ref 7–15)
AST SERPL W P-5'-P-CCNC: 25 U/L (ref 0–45)
BILIRUB SERPL-MCNC: 0.6 MG/DL
BUN SERPL-MCNC: 12.6 MG/DL (ref 6–20)
CALCIUM SERPL-MCNC: 9.4 MG/DL (ref 8.8–10.4)
CHLORIDE SERPL-SCNC: 102 MMOL/L (ref 98–107)
CHOLEST SERPL-MCNC: 220 MG/DL
CREAT SERPL-MCNC: 0.85 MG/DL (ref 0.67–1.17)
EGFRCR SERPLBLD CKD-EPI 2021: >90 ML/MIN/1.73M2
FASTING STATUS PATIENT QL REPORTED: ABNORMAL
FASTING STATUS PATIENT QL REPORTED: NORMAL
GLUCOSE SERPL-MCNC: 91 MG/DL (ref 70–99)
HCO3 SERPL-SCNC: 25 MMOL/L (ref 22–29)
HDLC SERPL-MCNC: 39 MG/DL
LDLC SERPL CALC-MCNC: 165 MG/DL
NONHDLC SERPL-MCNC: 181 MG/DL
POTASSIUM SERPL-SCNC: 3.8 MMOL/L (ref 3.4–5.3)
PROT SERPL-MCNC: 7.4 G/DL (ref 6.4–8.3)
PSA SERPL DL<=0.01 NG/ML-MCNC: 0.31 NG/ML (ref 0–2.5)
SODIUM SERPL-SCNC: 138 MMOL/L (ref 135–145)
TRIGL SERPL-MCNC: 79 MG/DL

## 2024-09-20 RX ORDER — ROSUVASTATIN CALCIUM 5 MG/1
5 TABLET, COATED ORAL DAILY
Qty: 90 TABLET | Refills: 3 | Status: SHIPPED | OUTPATIENT
Start: 2024-09-20

## 2024-09-20 NOTE — RESULT ENCOUNTER NOTE
Hi     It was good to see you again!      Your lab results are as follows:    -Your ASCVD risk score over the next 10 years is at 7.2%.  Your total cholesterol is stable at 220, but your HDL is low at 39.  My recommendation is that you start a statin medication. I will just order you a low dose, and then recheck value next year.   This medication will help decrease your LDL, and help prevent against a heart attack or stroke in the future.  If you disagree, please let me know.  Other ways to help decrease your cholesterol is by eating a diet that is low in saturated fat, such as the Mediterranean diet, and working on regular exercise.  The goal is 150 minutes of moderate activity per week.     -Your other labs are stable.  Your A1c bumped up a little bit, but this will get back under normal values once Wegovy is started.     -If you have any questions please let me know.     - The 10-year ASCVD risk score (Eliseo DUENAS, et al., 2019) is: 7.2%    Values used to calculate the score:      Age: 44 years      Sex: Male      Is Non- : Yes      Diabetic: No      Tobacco smoker: No      Systolic Blood Pressure: 121 mmHg      Is BP treated: Yes      HDL Cholesterol: 39 mg/dL      Total Cholesterol: 220 mg/dL    TENA Austin CNP

## 2024-09-26 ENCOUNTER — TELEPHONE (OUTPATIENT)
Dept: FAMILY MEDICINE | Facility: CLINIC | Age: 44
End: 2024-09-26
Payer: COMMERCIAL

## 2024-09-26 NOTE — TELEPHONE ENCOUNTER
----- Message from Bria Casey sent at 9/26/2024  8:21 AM CDT -----  Please call patient. Not looking at mychart    If you have questions, please let us know.     Bria Casey, TENA CNP

## 2024-09-26 NOTE — TELEPHONE ENCOUNTER
Patient informed of results. He would like to start on a statin medication. Please send to [facility].

## 2024-10-30 ENCOUNTER — TELEPHONE (OUTPATIENT)
Dept: ENDOCRINOLOGY | Facility: CLINIC | Age: 44
End: 2024-10-30
Payer: COMMERCIAL

## 2024-10-30 ENCOUNTER — VIRTUAL VISIT (OUTPATIENT)
Dept: PHARMACY | Facility: CLINIC | Age: 44
End: 2024-10-30
Attending: NURSE PRACTITIONER
Payer: COMMERCIAL

## 2024-10-30 VITALS — WEIGHT: 315 LBS | BODY MASS INDEX: 47.74 KG/M2 | HEIGHT: 68 IN

## 2024-10-30 DIAGNOSIS — E66.01 MORBID OBESITY (H): Primary | ICD-10-CM

## 2024-10-30 DIAGNOSIS — I10 ESSENTIAL HYPERTENSION: ICD-10-CM

## 2024-10-30 DIAGNOSIS — E78.5 DYSLIPIDEMIA: ICD-10-CM

## 2024-10-30 ASSESSMENT — PAIN SCALES - GENERAL: PAINLEVEL_OUTOF10: NO PAIN (0)

## 2024-10-30 NOTE — TELEPHONE ENCOUNTER
PA Initiation    Medication: WEGOVY 0.25 MG/0.5ML SC SOAJ  Insurance Company: [employer] - Phone [phone] Fax [phone]  Pharmacy Filling the Rx: [facility] - [place], [place] - [address]  Filling Pharmacy Phone:    Filling Pharmacy Fax:    Start Date: 10/30/2024

## 2024-10-30 NOTE — PROGRESS NOTES
Medication Therapy Management (MTM) Encounter    ASSESSMENT:                            Medication Adherence/Access: See below for considerations.    Weight management:   Patient would benefit from additional pharmacotherapy for weight management. Given class III obesity, recommend GLP1/GIP therapy as data supports most significant weight loss. Patient also likely to benefit from reduction in food noise and increased satiety. Negative GI symptomatology at baseline. Negative history of pancreatitis, medullary thyroid cancer and multiple endocrine neoplasia type 2.     For patients that are under [employer] insurance coverage, it is mandated by insurance that each qualifying patient meet with hospital based Weight Management Medication Therapy Management pharmacist to continue therapy coverage. The following patient meets the below coverage criteria and can therefore continue GLP-1/GIP agonist therapy for Weight Management:    Adult  BMI >40 with or without comorbidities   OR   BMI >30 + MASLD*   at time of initiating GLP-1/GIP agonist therapy Approved for 6 months, then Prior Authorization will be re-submitted to verify efficacy with insurance to continue coverage.  If meets updated Initial Criteria (at least 5% weight loss of baseline body weight), GLP1 or GLP1/GIP for Weight Management will be approved for 12 months.     Due to [facility] insurance discontinuing coverage of GLP1 agonists for Weight Management in 2025 year, much of today's discussion was spent discussing next steps including alternative injectable options - paying out of pocket w/ savings card cost, compound product through [facility], and others. From this, patient wishing to transition to compound semaglutide through [facility] after current supply of Wegovy is out.    Hypertension   Stable. BP at goal of <130/80 at most recent clinic check. Increased urination likely from  hydrochlorothiazide, could consider decrease of dose. Losartan could be optimized. Reasonable to reassess in 2-3 months with weight loss.       Hyperlipidemia   Stable.     PLAN:                            Pharmacist to start Prior Authorization on Wegovy. Once approved, to start Wegovy 0.25 mg subcutaneous once weekly for 4 weeks, then if tolerating increase to 0.5 mg weekly thereafter.  RX will be sent to [facility] for both 0.25 mg and 0.5 mg doses. The 0.25 mg dose will be filled. The 0.5 mg dose will be put on hold. Call the pharmacy when ready to fill the 0.5 mg box - this will be a 3 month supply to continue the medication into 2025.    Educated on mechanism, adverse effects, monitoring, safety, administration with use of Wegovy.   Establish care with [facility] provider. Our clinic coordinators will reach out to you to schedule.   Consider pursuing health  through [facility] clinic.   Education provided today of [facility] insurance requirements changing - stopping GLP1/GIP Agonist coverage for weight management in 2025.   Discussed options for continuing GLP1/GIP agonist in 2025.    Options for continuing GLP1/GIP agonist in 2025:  Pay out of pocket for Wegovy or Zepbound pens (>$1000/month cash price without savings card)   Zepbound cost with Zepbound savings card (link below to sign up for this): $650/month with card  [email or web address]://[email or web address]/enroll/checkEnrollment  Wegovy cost with Wegovy savings card (link below to sign up for this): $650/month with card   https://www.BaseKit/coverage-and-savings/save-on-wegovy.html  Compounded semaglutide (same active ingredient as Wegovy) from [facility] ($230/month for doses of 1mg or less; $370/month for doses higher than 1mg)  This is an available option for as long as Wegovy is on FDA shortage list, Wegovy has been on the list for the last several months  with no foreseeable end in sight as of now   Zepbound Vials through [facility] - vials only available in 2.5 mg and 5 mg doses  $399/month for 2.5mg vials  $549/month for 5mg vials   $5 per month for administrations supplies (syringe/needles, etc)     >>>>>  To help with tolerability and effectiveness of Wegovy:  Eat small meals/snacks throughout the day (about every 2-4 hours)  Focus on getting protein in first with each meal and snack.   A good starting goal is 60 g protein daily (track this, especially if at weight loss plateau). Once you consistently are getting 60g daily, try getting 90 g protein daily.  Drink plenty of water - goal 64 oz throughout the day  You may try Metamucil, Benefiber, or Citrucel to help feel more full (less nausea) and have softer, more consistent bowel movements.  To optimize weight management - work on incorporating resistance training/weight lifting to build muscle and improve overall metabolism of adipose tissue.    Wegovy Dosing:   Start Wegovy: It is a subcutaneous injection that you inject once weekly and titrate the dose slowly over time. Make sure inject the same time each day of the week, for example Monday evenings.  Each pen is single use.  In each prescription, you will get 4 pens in each box.  You will need a new prescription for each strength of the medication.  Week 1-4: Inject 0.25 mg once weekly  Week 5-8: If tolerating, increase to 0.5 mg once weekly  Week 9-12: If tolerating, increase to 1 mg once weekly  Week 13-15: If tolerating, increase to 1.7 mg once weekly  Week 16 & on: If tolerating, increase to 2.4 mg once weekly  *If you are having some nausea or other side effects to where you are hesitant to move up to the next dose, stay at the same dose you are on for an additional week to see if side effect(s) improves/resolves. Make sure to take this time to hydrate and ensure you are drinking at least 64 oz water per day.  If you are wanting to  stay at the same dose and do not have additional refills on that prescription please reach out to the clinic.    The goal is to get to a dose that is well tolerated and effective for you. You do not have to go up on the dose each month or get to maximum dose if getting an effective response with minimal side effects.     Wegovy Administration Video: Video that was created by the .  https://www.youZimrideube.com/watch?v=[email or web address]    Wegovy Storage and Stability:   Make sure that when you get the prescription that you store the prescription in the refrigerator until it is time to use the Wegovy pen.  Once it is time to use the Wegovy pen, you can keep the pen at room temperature and it is good for up to 28 days at room temperature.     Wegovy Common Side Effects:   Nausea, diarrhea, constipation, headache, tiredness (fatigue), dizziness, stomach upset/pain. Less commonly, Wegovy can cause low blood sugar (symptoms: shaky, dizzy, sweaty, agitation). Please reach out to the care team should you feel like this is occurring. It is important to ensure that you are eating consistent meals and not skipping meals. Ensure you are getting at least 64 oz water daily.       [facility] employees with [employer] insurance ([facility]) are restricted to using the [facility] for Saxenda, Wegovy, and Zepbound    [facility]  [place], [place] - [facility] [address]  Phone: [phone]    Next steps:  After processing the prescription and saving to your profile, the pharmacy will give you an automated call to inform you that they have your prescription on file. This typically occurs within 1-2 days after the prescription is sent but may take 3-5 business days during high volume times.  You will need to call the pharmacy back to set up the first delivery of every new prescription or new medication dose.   Once you are on a stable dose, you can inquire with the pharmacy  "about signing up for \"Text to Order through MScriMeBeam\", \"Auto Fill\", and/or using the \"My HeadMix Rx\" bernard.       Follow-up: Schedule with [facility] provider or Bria Casey PharmD in 3 months as needed       SUBJECTIVE/OBJECTIVE:                          Ilan Matthew is a 44 year old male seen for an initial visit. He was referred to me from Bria Casey.     Reason for visit: [facility] Insurance requirements, starting GLP1/GIP.     Allergies/ADRs: Reviewed in chart  Past Medical History: Reviewed in chart  Tobacco: He reports that he has never smoked. He has never used smokeless tobacco.  Alcohol: none      Medication Adherence/Access: no issues reported.      Medical History:  MEN2/Medullary Thyroid Cancer: none  Pancreatitis: none  Baseline GI symptomatology: some baseline constipation, uses Metamucil occasionally        Weight Management   No current medication    Nutrition/Eating Habits: working 7 on/7 off  - non-work day: breakfast by 10am, dinner 5-6pm - snacking inbetween fruit, desserts (ice cream after dinner)  - work days (overnight): lunch 3pm, cravings overnight/snacking, small breakfast 2-3am (fruit, etc), home   - cravings: sweet beverages, juices & soda, ice cream  - snacking out of hunger   - protein: eggs & mayo, meat with meals (chicken, beef, fish)   - feels more confident in       Exercise/Activity: adding exercise in the past has been effective, motivation is biggest barrier - enjoys going to gym, swimming - gym nearby is shutting down, has equipment at home - resistance machine, kettlebells, platform.   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    - accountability is important, supports motivation  -  interest " "    Current goals:   Lose 100 lbs within the next year  Increase confidence, fit and feeling of clothing    Medications Tried/Failed:  None.     Initial Consult Weight: 370 lbs (10/30/24)       Wt Readings from Last 4 Encounters:   10/30/24 (!) 370 lb (167.8 kg)   09/17/24 (!) 370 lb (167.8 kg)   08/20/24 (!) 375 lb 3.2 oz (170.2 kg)   05/16/23 (!) 351 lb (159.2 kg)     Estimated body mass index is 56.26 kg/m  as calculated from the following:    Height as of this encounter: 5' 8\" (1.727 m).    Weight as of this encounter: 370 lb (167.8 kg).      Hypertension   Hydrochlorothiazide 25 mg once daily  Losartan 25 mg twice daily   Will   Patient reports the following medication side effects: frequent urination with hydrochlorothiazide, impacts sleep schedule - no side effects from Losartan   Patient self monitors blood pressure.  Home BP monitoring inconsistently, willing to increase .       Hyperlipidemia   rosuvastatin 5 mg daily - has not started yet, picking up from pharmacy today.   The 10-year ASCVD risk score (Eliseo DUENAS, et al., 2019) is: 7.2%    Values used to calculate the score:      Age: 44 years      Sex: Male      Is Non- : Yes      Diabetic: No      Tobacco smoker: No      Systolic Blood Pressure: 121 mmHg      Is BP treated: Yes      HDL Cholesterol: 39 mg/dL      Total Cholesterol: 220 mg/dL       ----------------    I spent 56 minutes with this patient today. All changes were made via collaborative practice agreement with Bria Casey  and Rashida Benavidez PA-C as one of the credentialed prescriber of the [facility] MTM Weight Mgmt Program.   A copy of the visit note was provided to the patient's provider(s).    A summary of these recommendations was sent via [employer]    Hattie Baird, PharmD, BCACP  Medication Therapy Management (MTM) Pharmacist   [facility]      Telemedicine Visit Details  The patient's medications " can be safely assessed via a telemedicine encounter.  Type of service:  Video Conference via [employer]  Originating Location (pt. Location): Home    Distant Location (provider location):  Off-site  Start Time:  10:01 AM  End Time: 10:57 AM     Medication Therapy Recommendations  Morbid obesity (H)   1 Rationale: Untreated condition - Needs additional medication therapy - Indication   Recommendation: Start Medication   Status: Accepted per CPA   Identified Date: 10/30/2024 Completed Date: 10/30/2024

## 2024-10-30 NOTE — Clinical Note
SIXTO only, Rashida. Patient has PCP in [place]. Per [facility] insurance requirements - used our CPA today, started Wegovy. Aware of need to change to compounded semaglutide. Interested in establishing with [facility] provider. Sending msg to CC's to schedule.   Nancy

## 2024-10-30 NOTE — PATIENT INSTRUCTIONS
Recommendations from today's MTM visit:                                                    Pharmacist to start Prior Authorization on Wegovy. Once approved, to start Wegovy 0.25 mg subcutaneous once weekly for 4 weeks, then if tolerating increase to 0.5 mg weekly thereafter.  RX will be sent to [facility] for both 0.25 mg and 0.5 mg doses. The 0.25 mg dose will be filled. The 0.5 mg dose will be put on hold. Call the pharmacy when ready to fill the 0.5 mg box - this will be a 3 month supply to continue the medication into 2025.    Educated on mechanism, adverse effects, monitoring, safety, administration with use of Wegovy.   Establish care with [facility] provider. Our clinic coordinators will reach out to you to schedule.   Consider pursuing health  through [facility] clinic.   Education provided today of [facility] insurance requirements changing - stopping GLP1/GIP Agonist coverage for weight management in 2025.   Discussed options for continuing GLP1/GIP agonist in 2025.    Options for continuing GLP1/GIP agonist in 2025:  Pay out of pocket for Wegovy or Zepbound pens (>$1000/month cash price without savings card)   Zepbound cost with Zepbound savings card (link below to sign up for this): $650/month with card  [email or web address]://[email or web address]/checkEnrollment  Wegovy cost with Wegovy savings card (link below to sign up for this): $650/month with card   https://www.[employer]/coverage-and-savings/save-on-wegovy.html  Compounded semaglutide (same active ingredient as Wegovy) from [facility] ($230/month for doses of 1mg or less; $370/month for doses higher than 1mg)  This is an available option for as long as Wegovy is on FDA shortage list, Wegovy has been on the list for the last several months with no foreseeable end in sight as of now   Zepbound Vials through [facility] - vials only  available in 2.5 mg and 5 mg doses  $399/month for 2.5mg vials  $549/month for 5mg vials   $5 per month for administrations supplies (syringe/needles, etc)     >>>>>  To help with tolerability and effectiveness of Wegovy:  Eat small meals/snacks throughout the day (about every 2-4 hours)  Focus on getting protein in first with each meal and snack.   A good starting goal is 60 g protein daily (track this, especially if at weight loss plateau). Once you consistently are getting 60g daily, try getting 90 g protein daily.  Drink plenty of water - goal 64 oz throughout the day  You may try Metamucil, Benefiber, or Citrucel to help feel more full (less nausea) and have softer, more consistent bowel movements.  To optimize weight management - work on incorporating resistance training/weight lifting to build muscle and improve overall metabolism of adipose tissue.    Wegovy Dosing:   Start Wegovy: It is a subcutaneous injection that you inject once weekly and titrate the dose slowly over time. Make sure inject the same time each day of the week, for example Monday evenings.  Each pen is single use.  In each prescription, you will get 4 pens in each box.  You will need a new prescription for each strength of the medication.  Week 1-4: Inject 0.25 mg once weekly  Week 5-8: If tolerating, increase to 0.5 mg once weekly  Week 9-12: If tolerating, increase to 1 mg once weekly  Week 13-15: If tolerating, increase to 1.7 mg once weekly  Week 16 & on: If tolerating, increase to 2.4 mg once weekly  *If you are having some nausea or other side effects to where you are hesitant to move up to the next dose, stay at the same dose you are on for an additional week to see if side effect(s) improves/resolves. Make sure to take this time to hydrate and ensure you are drinking at least 64 oz water per day.  If you are wanting to stay at the same dose and do not have additional refills on that prescription please reach out to the  "clinic.    The goal is to get to a dose that is well tolerated and effective for you. You do not have to go up on the dose each month or get to maximum dose if getting an effective response with minimal side effects.     Wegovy Administration Video: Video that was created by the .  https://www.Hoff[employer]ister [employer]uchten.com/watch?v=[email or web address]    Wegovy Storage and Stability:   Make sure that when you get the prescription that you store the prescription in the refrigerator until it is time to use the Wegovy pen.  Once it is time to use the Wegovy pen, you can keep the pen at room temperature and it is good for up to 28 days at room temperature.     Wegovy Common Side Effects:   Nausea, diarrhea, constipation, headache, tiredness (fatigue), dizziness, stomach upset/pain. Less commonly, Wegovy can cause low blood sugar (symptoms: shaky, dizzy, sweaty, agitation). Please reach out to the care team should you feel like this is occurring. It is important to ensure that you are eating consistent meals and not skipping meals. Ensure you are getting at least 64 oz water daily.       [facility] employees with [employer] insurance ([facility]) are restricted to using the [facility] for Saxenda, Wegovy, and Zepbound    [facility]  [place] [address] [address]  Phone: [phone]    Next steps:  After processing the prescription and saving to your profile, the pharmacy will give you an automated call to inform you that they have your prescription on file. This typically occurs within 1-2 days after the prescription is sent but may take 3-5 business days during high volume times.  You will need to call the pharmacy back to set up the first delivery of every new prescription or new medication dose.   Once you are on a stable dose, you can inquire with the pharmacy about signing up for \"Text to Order through MScripts\", \"Auto Fill\", and/or using the \"My [employer] Rx\" " "bernard.       Follow-up: Schedule with [facility] provider or Bria Casey PharmD in 3 months as needed     It was great speaking with you today.  I value your experience and would be very thankful for your time in providing feedback in our clinic survey. In the next few days, you may receive an email or text message from [facility] [employer] with a link to a survey related to your  clinical pharmacist.\"     To schedule another MTM appointment, please call the clinic directly or you may call the MTM scheduling line at [phone] or toll-free at [phone].     My Clinical Pharmacist's contact information:                                                      Please feel free to contact me with any questions or concerns you have.      Hattie Baird, PharmD, [facility]  Medication Therapy Management (MTM) Pharmacist   [facility] Weight Management [facility]     "

## 2024-10-30 NOTE — TELEPHONE ENCOUNTER
Prior Authorization Retail Medication Request    Medication/Dose: [facility] insurance requirements Wegovy 0.25 mg - 2.4 mg (all doses) as indicated by supply, safety, and efficacy.  Diagnosis and ICD code (if different than what is on RX):    New/renewal/insurance change PA/secondary ins. PA:    Rationale: Ilan Matthew is a 44 year old male with a diagnosis of Class III Obesity (BMI at least 40 kg/m2). Patient met with [facility] Medication Therapy Management Pharmacist 10/30/2024 per [facility] insurance requirements. Patient denies personal or family history of MEN Type2, MTC, Pancreatitis.     Patient would benefit from additional pharmacotherapy for weight management. Given class III obesity, recommend GLP1/GIP therapy as data supports most significant weight loss. Patient also likely to benefit from reduction in food noise and increased satiety. Negative GI symptomatology at baseline. Negative history of pancreatitis, medullary thyroid cancer and multiple endocrine neoplasia type 2.     For patients that are under [employer] insurance coverage, it is mandated by insurance that each qualifying patient meet with hospital based Weight Management Medication Therapy Management pharmacist to continue therapy coverage. The following patient meets the below coverage criteria and can therefore continue GLP-1/GIP agonist therapy for Weight Management:    Adult  BMI >40 with or without comorbidities   OR   BMI >30 + MASLD*   at time of initiating GLP-1/GIP agonist therapy Approved for 6 months, then Prior Authorization will be re-submitted to verify efficacy with insurance to continue coverage.  If meets updated Initial Criteria (at least 5% weight loss of baseline body weight), GLP1 or GLP1/GIP for Weight Management will be approved for 12 months.         Estimated body mass index is 56.26 kg/m  as calculated from the following:    Height as of an earlier encounter on  "10/30/24: 5' 8\" (1.727 m).    Weight as of an earlier encounter on 10/30/24: 370 lb (167.8 kg).     Insurance   Primary:   Insurance ID:      Secondary (if applicable):  Insurance ID:      Pharmacy Information (if different than what is on RX)  Name:    Phone:    Fax:    "

## 2024-10-30 NOTE — NURSING NOTE
Current patient location: [address] [place] [address] [facility] [address]    Is the patient currently in the state of [place]? YES    Visit mode:VIDEO    If the visit is dropped, the patient can be reconnected by: VIDEO VISIT: Text to cell phone:   Telephone Information:   Mobile [phone]       Will anyone else be joining the visit? NO  (If patient encounters technical issues they should call [phone]    Are changes needed to the allergy or medication list? No, Pt stated no changes to allergies, and Pt stated no med changes    Are refills needed on medications prescribed by this physician? NO    Rooming Documentation:  Not applicable    Reason for visit: Consult    Zoe WAN

## 2024-10-31 ENCOUNTER — TELEPHONE (OUTPATIENT)
Dept: ENDOCRINOLOGY | Facility: CLINIC | Age: 44
End: 2024-10-31
Payer: COMMERCIAL

## 2024-10-31 NOTE — TELEPHONE ENCOUNTER
Prior Authorization Approval    Medication: WEGOVY 0.25 MG/0.5ML SC SOAJ  Authorization Effective Date: 10/30/2024  Authorization Expiration Date: 12/31/2024  Approved Dose/Quantity: 2  Reference #: [id number]   Insurance Company: [employer] - Phone [phone] Fax [phone]  Expected CoPay: $    CoPay Card Available:      Financial Assistance Needed:    Which Pharmacy is filling the prescription: [facility] - [place], [place] - [address]  Pharmacy Notified: yes  Patient Notified: yes

## 2024-10-31 NOTE — TELEPHONE ENCOUNTER
Patient confirmed scheduled appointment:  Date: 11/14/24  Time: 10 am  Visit type: MICHELLE MILLS  Provider: Laura Cramer  Location: virtual  Testing/imaging: n/a  Additional notes: n/a

## 2024-11-07 ASSESSMENT — PATIENT HEALTH QUESTIONNAIRE - PHQ9: SUM OF ALL RESPONSES TO PHQ QUESTIONS 1-9: 1

## 2024-11-07 ASSESSMENT — ANXIETY QUESTIONNAIRES: GAD7 TOTAL SCORE: 2

## 2024-11-13 ASSESSMENT — SLEEP AND FATIGUE QUESTIONNAIRES
HOW LIKELY ARE YOU TO NOD OFF OR FALL ASLEEP IN A CAR, WHILE STOPPED FOR A FEW MINUTES IN TRAFFIC: WOULD NEVER DOZE
HOW LIKELY ARE YOU TO NOD OFF OR FALL ASLEEP WHILE SITTING QUIETLY AFTER LUNCH WITHOUT ALCOHOL: SLIGHT CHANCE OF DOZING
HOW LIKELY ARE YOU TO NOD OFF OR FALL ASLEEP WHILE SITTING INACTIVE IN A PUBLIC PLACE: WOULD NEVER DOZE
HOW LIKELY ARE YOU TO NOD OFF OR FALL ASLEEP WHILE SITTING AND READING: WOULD NEVER DOZE
HOW LIKELY ARE YOU TO NOD OFF OR FALL ASLEEP WHILE LYING DOWN TO REST IN THE AFTERNOON WHEN CIRCUMSTANCES PERMIT: MODERATE CHANCE OF DOZING
HOW LIKELY ARE YOU TO NOD OFF OR FALL ASLEEP WHEN YOU ARE A PASSENGER IN A CAR FOR AN HOUR WITHOUT A BREAK: WOULD NEVER DOZE
HOW LIKELY ARE YOU TO NOD OFF OR FALL ASLEEP WHILE WATCHING TV: WOULD NEVER DOZE
HOW LIKELY ARE YOU TO NOD OFF OR FALL ASLEEP WHILE SITTING AND TALKING TO SOMEONE: WOULD NEVER DOZE

## 2024-11-14 ENCOUNTER — VIRTUAL VISIT (OUTPATIENT)
Dept: ENDOCRINOLOGY | Facility: CLINIC | Age: 44
End: 2024-11-14
Payer: COMMERCIAL

## 2024-11-14 VITALS — WEIGHT: 315 LBS | HEIGHT: 68 IN | BODY MASS INDEX: 47.74 KG/M2

## 2024-11-14 DIAGNOSIS — E66.01 CLASS 3 SEVERE OBESITY WITH SERIOUS COMORBIDITY AND BODY MASS INDEX (BMI) OF 50.0 TO 59.9 IN ADULT, UNSPECIFIED OBESITY TYPE (H): ICD-10-CM

## 2024-11-14 DIAGNOSIS — F33.0 MILD EPISODE OF RECURRENT MAJOR DEPRESSIVE DISORDER (H): ICD-10-CM

## 2024-11-14 DIAGNOSIS — I10 ESSENTIAL HYPERTENSION: Primary | ICD-10-CM

## 2024-11-14 DIAGNOSIS — E66.813 CLASS 3 SEVERE OBESITY WITH SERIOUS COMORBIDITY AND BODY MASS INDEX (BMI) OF 50.0 TO 59.9 IN ADULT, UNSPECIFIED OBESITY TYPE (H): ICD-10-CM

## 2024-11-14 PROCEDURE — G2211 COMPLEX E/M VISIT ADD ON: HCPCS | Mod: 95

## 2024-11-14 PROCEDURE — 99205 OFFICE O/P NEW HI 60 MIN: CPT | Mod: 95

## 2024-11-14 ASSESSMENT — PAIN SCALES - GENERAL: PAINLEVEL_OUTOF10: NO PAIN (0)

## 2024-11-14 NOTE — PROGRESS NOTES
"Virtual Visit Details    Type of service:  Video Visit     Originating Location (pt. Location): Home    Distant Location (provider location):  Off-site  Platform used for Video Visit: AmWell      55 minutes spent by me on the date of the encounter doing chart review, history and exam, documentation and further activities per the note    New Medical Weight Management Consult    PATIENT:  Ilan Matthew  MRN:         [id number]  :         1980  JURGEN:         2024    Dear Bria Casey,    I had the pleasure of seeing your patient, Ilan Matthew. Full intake/assessment was done to determine barriers to weight loss success and develop a treatment plan. Ilan Matthew is a 44 year old male interested in treatment of medical problems associated with excess weight. He has a height of 5' 7.992\", a weight of 370 lbs 0 oz, and the calculated Body mass index is 56.27 kg/m .            Assessment & Plan   Problem List Items Addressed This Visit       Essential hypertension - Primary    Relevant Orders    Adult Mental Health  Referral    Adult Sleep Evaluation & Management  Referral    Mild episode of recurrent major depressive disorder (H)    Relevant Orders    Adult Mental Health  Referral     Other Visit Diagnoses       Class 3 severe obesity with serious comorbidity and body mass index (BMI) of 50.0 to 59.9 in adult, unspecified obesity type (H)        Relevant Orders    Adult Mental Health  Referral    Adult Sleep Evaluation & Management  Referral             Plan  Continue wegovy titration with plan to switch to compounded semaglutide in the new year ([facility])  Discussed bariatric surgery as Ilan would likely be an excellent candidate- Ilan will look into this, has a lot of anxiety around any invasive procedures   Goals we discussed today:   Gradually increasing exercise throughout the week   Cut out all liquid sugar   [facility] pharmacy in 2 months to check " in   Follow up with Laura in 3 months   Dietician appointment to be scheduled asap  Sleep medicine referral placed given BMI, dyspnea with sleeping on back, snoring   Mental health referral placed            Potential anti-obesity medications for this patient the future if there are issues with cost or side effects  TOPIRAMATE  Did not discuss, could consider in the future   No history of kidney stones  No history of glaucoma   No issues with memory  No chronic kidney disease   .  METFORMIN  Did not discuss, could consider in the future   Has diagnosis of prediabetes   No history of chronic kidney disease  GFR >45  .    The following medications could be considered with caution for this patient   NALTREXONE  No history of liver disease  No current opioid use   Caution is needed with this medication due to chronic pain, currently not needing opioid medications. Itchy side effect with contrave, unclear if side effect came from naltrexone component vs wellbutrin component.   .  BUPROPION  No history of bipolar disorder  No history of cardiovascular disease   No history of cardiac arrhythmias   No history of seizures  No history of bulimia nervosa  No history of anorexia nervosa  Caution would be needed with this medication due to HTN currently within goal. Further caution needed due to itchy side effect with contrave, unclear if side effect came from naltrexone component vs wellbutrin component   .     Contraindicated/Failed Weight loss medications for this patient   PHENTERMINE  Has taken in the past, saw immediate heart racing   .  CONTRAVE  Has taken in the past - did not tolerate, felt itchy all over his body   .          Ilan Matthew is a 44 year old male who presents to clinic today for the following health issues.     He has the following co-morbidities:        11/13/2024     9:19 AM   --   I have the following health issues associated with obesity High Blood Pressure    High Cholesterol   I have the  "following symptoms associated with obesity Knee Pain    Infertility (Difficulty Getting Pregnant)    Depression            No data to display                    11/13/2024     9:19 AM   Referring Provider   Please name the provider who referred you to Medical Weight Management  If you do not know, please answer \"I Don't Know\" Donavon Tucker       Overweight onset age 20, reached 200lbs. Weight was around 180 prior to this, he actually wanted to get bigger at that time, so weight gain to 200lbs was intentional. Saw further, unintentional weight gain with work stress, marriage, divorce, depression, moving across the country- moved from [place] to [place]. Saw continued weight gain despite frequent exercise at the gym. 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   Started working nights 10 years ago, also became a parent that year- started to  more shifts to help provide for his family, further weight gain with all of this.   Currently working very long hours every other week, at times will do 16 hour shifts in a row, but then will be off for 7 days in between these weeks.     Has been able to lose 30-40 lbs in the past with increased exercise, diet changes, AOMs, but would not be able to sustain these changes, loss of motivation. Has been fluctuating between 330 and 370lbs for the last 5 years. Current weight of 370lbs is highest weight in life.       Comorbidities associated with weight gain include prediabetes, HTN, plantar fasciitis (resolved with orthotics and adjustment in footwear), knee pain (improved with regular exercise), GERD (symptomatic only if lying down, managed with not eating close to bed time). Has been told he snores, but is not waking up out of breath. Has never had a sleep study. Struggles to sleep on his back- if he does this he will feel short of breath.     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     He is interested in starting a medication as a tool for working towards sustainable weight loss.    Regarding eating patterns and diet, he  typically eats 1-3 meals a day plus snacks, on his work weeks when he's working his long hours will generally have 1 big meal after work and snacks during work. Craves sweets- ice cream, soda, chocolate. Is able to get full. Unsure if he's able to stay full until next meal. Does not feel he struggles with portion control. Does not feel he experiences food noise. Does not generally experience emotional eating, aside from boredom snacking. Does not feel he experiences a loss of control around eating .    Eats out/ gets take out once a month. Drinks more juice than he drinks water- orange juice, apple juice. Can of soda (sprite, diet coke, gingerale) 1-2 times a week. Some tea with sugar. No ETOH. Energy drinks purchased online, zero to 5 calories each.     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 Is generally very active at work, on his feet about half the time, otherwise will be sitting down for a 1:1 with a patient. Struggles to exercise lately due to motivation, has some gym equipment in his basement, thinks about it a lot, but struggles to use it. Does yard work, will shovel snow in the winter.         Past/ Current AOMs   Wegovy 0.25mg- first dose yesterday, unsure how he feels on it yet, maybe some discomfort with eating later in the day yesterday.       Past:   Phentermine- racing heart, describes he felt like he was having a heart attack   Contrave- felt itching all over body, no rash.           11/13/2024     9:19 AM   Weight History   How concerned are you about your weight? Very Concerned   I became overweight As an Adult   The following factors have  contributed to my weight gain Change in Schedule    Eating Too Much    Lack of Exercise    Genetic (Runs in the Family)    Stress   I have tried the following methods to lose weight Watching Portions or Calories    Exercise    Atkins-type Diet (Low Carb/High Protein)    Medications    Meal Replacements    Fasting   My lowest weight since age 16 was 185   My highest weight since age 16 was 370   The most weight I have ever lost was (lbs) 50   I have the following family history of obesity/being overweight My mother is overweight    My father is overweight    One or more of my siblings are overweight   How has your weight changed over the last year? Gained   How many pounds? 45           11/13/2024     9:19 AM   Diet Recall Review with Patient   If you do eat breakfast, what types of food do you eat? Oatmeal, eggs, herbal teas, beacons, sausages   If you do eat lunch, what types of food do you typically eat? Rice, fufu, greens soup such as spinach, chicken or beef   If you do eat supper, what types of food do you typically eat? Rice, fufu, fish, greens soup ,  meat or chicken   If you do snack, what types of food do you typically eat? Ice cream, grapes   How many glasses of juice do you drink in a typical day? 4   How many of glasses of milk do you drink in a typical day? 0   How many 8oz glasses of sugar containing drinks such as Shahzad-Aid/sweet tea do you drink in a day? 4   How many cans/bottles of sugar pop/soda/tea/sports drinks do you drink in a day? 1   How many cans/bottles of diet pop/soda/tea or sports drink do you drink in a day? 1   How often do you have a drink of alcohol? Never           11/13/2024     9:19 AM   Eating Habits   Generally, my meals include foods like these bread, pasta, rice, potatoes, corn, crackers, sweet dessert, pop, or juice Almost Everyday   Generally, my meals include foods like these fried meats, brats, burgers, french fries, pizza, cheese, chips, or ice cream Less Than Weekly    Eat fast food (like McDonalds, [employer], Taco Bell) Never   Eat at a buffet or sit-down restaurant Never   Eat most of my meals in front of the TV or computer A Few Times a Week   Often skip meals, eat at random times, have no regular eating times A Few Times a Week   Rarely sit down for a meal but snack or graze throughout Less Than Weekly   Eat extra snacks between meals A Few Times a Week   Eat most of my food at the end of the day A Few Times a Week   Eat in the middle of the night or wake up at night to eat Less Than Weekly   Eat extra snacks to prevent or correct low blood sugar A Few Times a Week   Eat to prevent acid reflux or stomach pain Less Than Weekly   Worry about not having enough food to eat Never   I eat when I am depressed Once a Week   I eat when I am stressed A Few Times a Week   I eat when I am bored A Few Times a Week   I eat when I am anxious Never   I eat when I am happy or as a reward A Few Times a Week   I feel hungry all the time even if I just have eaten Less Than Weekly   Feeling full is important to me Everyday   I finish all the food on my plate even if I am already full Everyday   I can't resist eating delicious food or walk past the good food/smell Almost Everyday   I eat/snack without noticing that I am eating Never   I eat when I am preparing the meal A Few Times a Week   I eat more than usual when I see others eating A Few Times a Week   I have trouble not eating sweets, ice cream, cookies, or chips if they are around the house A Few Times a Week   I think about food all day Less Than Weekly   What foods, if any, do you crave? Sweets/Candy/Chocolate   Please list any other foods you crave? Ice cream and chocolate           11/13/2024     9:19 AM   Amount of Food   I feel out of control when eating Never   I eat a large amount of food, like a loaf of bread, a box of cookies, a pint/quart of ice cream, all at once Monthly   I eat a large amount of food even when I am not hungry  Never   I eat rapidly Monthly   I eat alone because I feel embarrassed and do not want others to see how much I have eaten Never   I eat until I am uncomfortably full Never   I feel bad, disgusted, or guilty after I overeat Never           11/13/2024     9:19 AM   Activity/Exercise History   How much of a typical 12 hour day do you spend sitting? Half the Day   How much of a typical 12 hour day do you spend lying down? Half the Day   How much of a typical day do you spend walking/standing? Less Than Half the Day   How many hours (not including work) do you spend on the TV/Video Games/Computer/Tablet/Phone? 4-5 Hours   How many times a week are you active for the purpose of exercise? Never   What keeps you from being more active? Other   How many total minutes do you spend doing some activity for the purpose of exercising when you exercise? More Than 30 Minutes       PAST MEDICAL HISTORY:  Past Medical History:   Diagnosis Date    Arthritis     Hypertension     Obese            11/13/2024     9:19 AM   Work/Social History Reviewed With Patient   My employment status is Full-Time   My job is Psychiatric Associate   How much of your job is spent on the computer or phone? Less Than 50%   How many hours do you spend commuting to work daily? 25 minutes   What is your marital status? /In a Relationship   If in a relationship, is your significant other overweight? No   If you have children, are they overweight? No   Who do you live with? Spouse and children   Who does the food shopping? 75% myself and the rest my spouse       Marijuana use- none   Alcohol use - none   Caffeine use - diet coke occasionally. Energy drinks every day he works- low calorie.             11/13/2024     9:19 AM   Mental Health History Reviewed With Patient   Have you ever been physically or sexually abused? No   How often in the past 2 weeks have you felt little interest or pleasure in doing things? Not at all   Over the past 2 weeks how  "often have you felt down, depressed, or hopeless? Not at all             11/13/2024     9:19 AM   Sleep History Reviewed With Patient   How many hours do you sleep at night? 8         MEDICATIONS:   Current Outpatient Medications   Medication Sig Dispense Refill    hydrochlorothiazide (HYDRODIURIL) 25 MG tablet Take 1 tablet (25 mg) by mouth daily. 90 tablet 3    losartan (COZAAR) 25 MG tablet Take 1 tablet (25 mg) by mouth 2 times daily. 180 tablet 3    rosuvastatin (CRESTOR) 5 MG tablet Take 1 tablet (5 mg) by mouth daily. 90 tablet 3    Semaglutide-Weight Management (WEGOVY) 0.25 MG/0.5ML pen Inject 0.25 mg subcutaneously once a week. For 4 weeks. 2 mL 0    Semaglutide-Weight Management (WEGOVY) 0.5 MG/0.5ML pen Inject 0.5 mg subcutaneously once a week. After completing 0.25 mg doses (start week 5). 6 mL 1           ALLERGIES:   Allergies   Allergen Reactions    Fish Oil [Omega-3 Fatty Acids] Other (See Comments)     Swollen tongue           11/13/2024     8:44 AM   YARON Score (Last Two)   YARON Raw Score 30    Activation Score 56    YARON Level 3        Patient-reported             Objective    Ht 1.727 m (5' 7.99\")   Wt (!) 167.8 kg (370 lb)   BMI 56.27 kg/m    Vitals - Patient Reported  Pain Score: No Pain (0)      Vitals:  No vitals were obtained today due to virtual visit.    Physical Exam   GENERAL: alert and no distress  EYES: Eyes grossly normal to inspection.  No discharge or erythema, or obvious scleral/conjunctival abnormalities.  RESP: No audible wheeze, cough, or visible cyanosis.    SKIN: Visible skin clear. No significant rash, abnormal pigmentation or lesions.  NEURO: Cranial nerves grossly intact.  Mentation and speech appropriate for age.  PSYCH: Appropriate affect, tone, and pace of words     Anti-obesity medication ROS:    HEENT  Hx of glaucoma: No    Cardiovascular  CAD:No  HTN:Yes      Gastrointestinal  GERD:Yes  Constipation: at times, managed with metamucil prn   Liver Dz:No - wife has Hep B "   H/O Pancreatitis:No    Psychiatric  Bipolar: No  Anxiety:No  Depression: off and on, does not see a therapist, is open to working with one   History of alcohol/drug abuse: No  Hx of eating disorder:No    Endocrine  Personal or family hx of MTC or MEN2:No  Diabetes/prediabetes: Yes prediabetes     Neurologic:  Hx of seizures: No  Hx of migraines: Yes  Memory Impairment: No  CVA history: No . youngest sister had brain aneurism age 43.       History of kidney stones: No  Kidney disease: No      Taking Opioid/Narcotic: No        Sincerely,    Laura Cramer PA-C     The longitudinal plan of care for the diagnosis(es)/condition(s) as documented were addressed during this visit. Due to the added complexity in care, I will continue to support Ilan in the subsequent management and with ongoing continuity of care.

## 2024-11-14 NOTE — LETTER
"2024       RE: Ilan Matthew  [address]  [facility] [address]     Dear Colleague,    Thank you for referring your patient, Ilan Matthew, to the [facility] [place] at [facility]. Please see a copy of my visit note below.    Virtual Visit Details    Type of service:  Video Visit     Originating Location (pt. Location): Home    Distant Location (provider location):  Off-site  Platform used for Video Visit: AmNvest      55 minutes spent by me on the date of the encounter doing chart review, history and exam, documentation and further activities per the note    New Medical Weight Management Consult    PATIENT:  Ilan Matthew  MRN:         [id number]  :         1980  JURGEN:         2024    Dear Bria Casey,    I had the pleasure of seeing your patient, Ilan Matthew. Full intake/assessment was done to determine barriers to weight loss success and develop a treatment plan. Ilan Matthew is a 44 year old male interested in treatment of medical problems associated with excess weight. He has a height of 5' 7.992\", a weight of 370 lbs 0 oz, and the calculated Body mass index is 56.27 kg/m .            Assessment & Plan  Problem List Items Addressed This Visit       Essential hypertension - Primary    Relevant Orders    Adult Mental Health  Referral    Adult Sleep Evaluation & Management  Referral    Mild episode of recurrent major depressive disorder (H)    Relevant Orders    Adult Mental Health  Referral     Other Visit Diagnoses       Class 3 severe obesity with serious comorbidity and body mass index (BMI) of 50.0 to 59.9 in adult, unspecified obesity type (H)        Relevant Orders    Adult Mental Health  Referral    Adult Sleep Evaluation & Management  Referral             Plan  Continue wegovy titration with plan to switch to compounded semaglutide in the new year " ([facility] insurance)  Discussed bariatric surgery as Suedi would likely be an excellent candidate- Suedi will look into this, has a lot of anxiety around any invasive procedures   Goals we discussed today:   Gradually increasing exercise throughout the week   Cut out all liquid sugar   [facility] pharmacy in 2 months to check in   Follow up with Laura in 3 months   Dietician appointment to be scheduled asap  Sleep medicine referral placed given BMI, dyspnea with sleeping on back, snoring   Mental health referral placed            Potential anti-obesity medications for this patient the future if there are issues with cost or side effects  TOPIRAMATE  Did not discuss, could consider in the future   No history of kidney stones  No history of glaucoma   No issues with memory  No chronic kidney disease   .  METFORMIN  Did not discuss, could consider in the future   Has diagnosis of prediabetes   No history of chronic kidney disease  GFR >45  .    The following medications could be considered with caution for this patient   NALTREXONE  No history of liver disease  No current opioid use   Caution is needed with this medication due to chronic pain, currently not needing opioid medications. Itchy side effect with contrave, unclear if side effect came from naltrexone component vs wellbutrin component.   .  BUPROPION  No history of bipolar disorder  No history of cardiovascular disease   No history of cardiac arrhythmias   No history of seizures  No history of bulimia nervosa  No history of anorexia nervosa  Caution would be needed with this medication due to HTN currently within goal. Further caution needed due to itchy side effect with contrave, unclear if side effect came from naltrexone component vs wellbutrin component   .     Contraindicated/Failed Weight loss medications for this patient   PHENTERMINE  Has taken in the past, saw immediate heart racing   .  CONTRAVE  Has taken in the past - did not tolerate, felt itchy all over his  "body   .          Ilan Matthew is a 44 year old male who presents to clinic today for the following health issues.     He has the following co-morbidities:        11/13/2024     9:19 AM   --   I have the following health issues associated with obesity High Blood Pressure    High Cholesterol   I have the following symptoms associated with obesity Knee Pain    Infertility (Difficulty Getting Pregnant)    Depression            No data to display                    11/13/2024     9:19 AM   Referring Provider   Please name the provider who referred you to Medical Weight Management  If you do not know, please answer \"I Don't Know\" Donavon Tucker       Overweight onset age 20, reached 200lbs. Weight was around 180 prior to this, he actually wanted to get bigger at that time, so weight gain to 200lbs was intentional. Saw further, unintentional weight gain with work stress, marriage, divorce, depression, moving across the country- moved from [place] to [place]. Saw continued weight gain despite frequent exercise at the gym. 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   Started working nights 10 years ago, also became a parent that year- started to  more shifts to help provide for his family, further weight gain with all of this.   Currently working very long hours every other week, at times will do 16 hour shifts in a row, but then will be off for 7 days in between these weeks.     Has been able to lose 30-40 lbs in the past with increased exercise, diet changes, AOMs, but would not be able to sustain these changes, loss of motivation. Has been fluctuating between 330 and 370lbs for the last 5 years. Current weight of 370lbs is highest weight in life.       Comorbidities associated with weight gain include prediabetes, HTN, plantar fasciitis (resolved with orthotics and adjustment in footwear), knee pain (improved with regular exercise), GERD " (symptomatic only if lying down, managed with not eating close to bed time). Has been told he snores, but is not waking up out of breath. Has never had a sleep study. Struggles to sleep on his back- if he does this he will feel short of breath.     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     He is interested in starting a medication as a tool for working towards sustainable weight loss.    Regarding eating patterns and diet, he  typically eats 1-3 meals a day plus snacks, on his work weeks when he's working his long hours will generally have 1 big meal after work and snacks during work. Craves sweets- ice cream, soda, chocolate. Is able to get full. Unsure if he's able to stay full until next meal. Does not feel he struggles with portion control. Does not feel he experiences food noise. Does not generally experience emotional eating, aside from boredom snacking. Does not feel he experiences a loss of control around eating .    Eats out/ gets take out once a month. Drinks more juice than he drinks water- orange juice, apple juice. Can of soda (sprite, diet coke, gingerale) 1-2 times a week. Some tea with sugar. No ETOH. Energy drinks purchased online, zero to 5 calories each.     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 Is generally very active at work, on his feet about half the time, otherwise will be sitting down for a 1:1 with a patient. Struggles to exercise lately due to motivation, has some gym equipment in his basement, thinks about it a lot, but struggles to use it. Does yard work, will shovel snow in the winter.         Past/ Current AOMs   Wegovy 0.25mg- first dose yesterday, unsure how he feels on it yet, maybe some discomfort with eating later in the day yesterday.       Past:    Phentermine- racing heart, describes he felt like he was having a heart attack   Contrave- felt itching all over body, no rash.           11/13/2024     9:19 AM   Weight History   How concerned are you about your weight? Very Concerned   I became overweight As an Adult   The following factors have contributed to my weight gain Change in Schedule    Eating Too Much    Lack of Exercise    Genetic (Runs in the Family)    Stress   I have tried the following methods to lose weight Watching Portions or Calories    Exercise    Atkins-type Diet (Low Carb/High Protein)    Medications    Meal Replacements    Fasting   My lowest weight since age 16 was 185   My highest weight since age 16 was 370   The most weight I have ever lost was (lbs) 50   I have the following family history of obesity/being overweight My mother is overweight    My father is overweight    One or more of my siblings are overweight   How has your weight changed over the last year? Gained   How many pounds? 45           11/13/2024     9:19 AM   Diet Recall Review with Patient   If you do eat breakfast, what types of food do you eat? Oatmeal, eggs, herbal teas, beacons, sausages   If you do eat lunch, what types of food do you typically eat? Rice, fufu, greens soup such as spinach, chicken or beef   If you do eat supper, what types of food do you typically eat? Rice, fufu, fish, greens soup ,  meat or chicken   If you do snack, what types of food do you typically eat? Ice cream, grapes   How many glasses of juice do you drink in a typical day? 4   How many of glasses of milk do you drink in a typical day? 0   How many 8oz glasses of sugar containing drinks such as Shahzad-Aid/sweet tea do you drink in a day? 4   How many cans/bottles of sugar pop/soda/tea/sports drinks do you drink in a day? 1   How many cans/bottles of diet pop/soda/tea or sports drink do you drink in a day? 1   How often do you have a drink of alcohol? Never           11/13/2024     9:19 AM    Eating Habits   Generally, my meals include foods like these bread, pasta, rice, potatoes, corn, crackers, sweet dessert, pop, or juice Almost Everyday   Generally, my meals include foods like these fried meats, brats, burgers, french fries, pizza, cheese, chips, or ice cream Less Than Weekly   Eat fast food (like McDonalds, [employer], Taco Bell) Never   Eat at a buffet or sit-down restaurant Never   Eat most of my meals in front of the TV or computer A Few Times a Week   Often skip meals, eat at random times, have no regular eating times A Few Times a Week   Rarely sit down for a meal but snack or graze throughout Less Than Weekly   Eat extra snacks between meals A Few Times a Week   Eat most of my food at the end of the day A Few Times a Week   Eat in the middle of the night or wake up at night to eat Less Than Weekly   Eat extra snacks to prevent or correct low blood sugar A Few Times a Week   Eat to prevent acid reflux or stomach pain Less Than Weekly   Worry about not having enough food to eat Never   I eat when I am depressed Once a Week   I eat when I am stressed A Few Times a Week   I eat when I am bored A Few Times a Week   I eat when I am anxious Never   I eat when I am happy or as a reward A Few Times a Week   I feel hungry all the time even if I just have eaten Less Than Weekly   Feeling full is important to me Everyday   I finish all the food on my plate even if I am already full Everyday   I can't resist eating delicious food or walk past the good food/smell Almost Everyday   I eat/snack without noticing that I am eating Never   I eat when I am preparing the meal A Few Times a Week   I eat more than usual when I see others eating A Few Times a Week   I have trouble not eating sweets, ice cream, cookies, or chips if they are around the house A Few Times a Week   I think about food all day Less Than Weekly   What foods, if any, do you crave? Sweets/Candy/Chocolate   Please list any other foods you  crave? Ice cream and chocolate           11/13/2024     9:19 AM   Amount of Food   I feel out of control when eating Never   I eat a large amount of food, like a loaf of bread, a box of cookies, a pint/quart of ice cream, all at once Monthly   I eat a large amount of food even when I am not hungry Never   I eat rapidly Monthly   I eat alone because I feel embarrassed and do not want others to see how much I have eaten Never   I eat until I am uncomfortably full Never   I feel bad, disgusted, or guilty after I overeat Never           11/13/2024     9:19 AM   Activity/Exercise History   How much of a typical 12 hour day do you spend sitting? Half the Day   How much of a typical 12 hour day do you spend lying down? Half the Day   How much of a typical day do you spend walking/standing? Less Than Half the Day   How many hours (not including work) do you spend on the TV/Video Games/Computer/Tablet/Phone? 4-5 Hours   How many times a week are you active for the purpose of exercise? Never   What keeps you from being more active? Other   How many total minutes do you spend doing some activity for the purpose of exercising when you exercise? More Than 30 Minutes       PAST MEDICAL HISTORY:  Past Medical History:   Diagnosis Date     Arthritis      Hypertension      Obese            11/13/2024     9:19 AM   Work/Social History Reviewed With Patient   My employment status is Full-Time   My job is Psychiatric Associate   How much of your job is spent on the computer or phone? Less Than 50%   How many hours do you spend commuting to work daily? 25 minutes   What is your marital status? /In a Relationship   If in a relationship, is your significant other overweight? No   If you have children, are they overweight? No   Who do you live with? Spouse and children   Who does the food shopping? 75% myself and the rest my spouse       Marijuana use- none   Alcohol use - none   Caffeine use - diet coke occasionally. Energy drinks  "every day he works- low calorie.             11/13/2024     9:19 AM   Mental Health History Reviewed With Patient   Have you ever been physically or sexually abused? No   How often in the past 2 weeks have you felt little interest or pleasure in doing things? Not at all   Over the past 2 weeks how often have you felt down, depressed, or hopeless? Not at all             11/13/2024     9:19 AM   Sleep History Reviewed With Patient   How many hours do you sleep at night? 8         MEDICATIONS:   Current Outpatient Medications   Medication Sig Dispense Refill     hydrochlorothiazide (HYDRODIURIL) 25 MG tablet Take 1 tablet (25 mg) by mouth daily. 90 tablet 3     losartan (COZAAR) 25 MG tablet Take 1 tablet (25 mg) by mouth 2 times daily. 180 tablet 3     rosuvastatin (CRESTOR) 5 MG tablet Take 1 tablet (5 mg) by mouth daily. 90 tablet 3     Semaglutide-Weight Management (WEGOVY) 0.25 MG/0.5ML pen Inject 0.25 mg subcutaneously once a week. For 4 weeks. 2 mL 0     Semaglutide-Weight Management (WEGOVY) 0.5 MG/0.5ML pen Inject 0.5 mg subcutaneously once a week. After completing 0.25 mg doses (start week 5). 6 mL 1           ALLERGIES:   Allergies   Allergen Reactions     Fish Oil [Omega-3 Fatty Acids] Other (See Comments)     Swollen tongue           11/13/2024     8:44 AM   YARON Score (Last Two)   YARON Raw Score 30    Activation Score 56    YARON Level 3        Patient-reported             Objective   Ht 1.727 m (5' 7.99\")   Wt (!) 167.8 kg (370 lb)   BMI 56.27 kg/m    Vitals - Patient Reported  Pain Score: No Pain (0)      Vitals:  No vitals were obtained today due to virtual visit.    Physical Exam   GENERAL: alert and no distress  EYES: Eyes grossly normal to inspection.  No discharge or erythema, or obvious scleral/conjunctival abnormalities.  RESP: No audible wheeze, cough, or visible cyanosis.    SKIN: Visible skin clear. No significant rash, abnormal pigmentation or lesions.  NEURO: Cranial nerves grossly intact.  " Mentation and speech appropriate for age.  PSYCH: Appropriate affect, tone, and pace of words     Anti-obesity medication ROS:    HEENT  Hx of glaucoma: No    Cardiovascular  CAD:No  HTN:Yes      Gastrointestinal  GERD:Yes  Constipation: at times, managed with metamucil prn   Liver Dz:No - wife has Hep B   H/O Pancreatitis:No    Psychiatric  Bipolar: No  Anxiety:No  Depression: off and on, does not see a therapist, is open to working with one   History of alcohol/drug abuse: No  Hx of eating disorder:No    Endocrine  Personal or family hx of MTC or MEN2:No  Diabetes/prediabetes: Yes prediabetes     Neurologic:  Hx of seizures: No  Hx of migraines: Yes  Memory Impairment: No  CVA history: No . youngest sister had brain aneurism age 43.       History of kidney stones: No  Kidney disease: No      Taking Opioid/Narcotic: No        Sincerely,    Laura Cramer PA-C     The longitudinal plan of care for the diagnosis(es)/condition(s) as documented were addressed during this visit. Due to the added complexity in care, I will continue to support Ilan in the subsequent management and with ongoing continuity of care.       Again, thank you for allowing me to participate in the care of your patient.      Sincerely,    Laura Cramer PA-C

## 2024-11-14 NOTE — PATIENT INSTRUCTIONS
"Thank you for allowing us the privilege of caring for you. We hope we provided you with the excellent service you deserve.   Please let us know if there is anything else we can do for you so that we can be sure you are completely satisfied with your care experience.    To ensure the quality of our services you may be receiving a patient satisfaction survey from an independent patient satisfaction monitoring company.    The greatest compliment you can give is a \"Likely to Recommend\"    Your visit was with Laura Cramer PA-C today.    Instructions per today's visit:     Julio Cesar Matthew, it was great to visit with you today.  Here is a review of our visit.  If our clinic scheduler is not able to reach you please call [phone] to schedule your next appointments.      Plan  Continue wegovy titration with plan to switch to compounded semaglutide in the new year ( insurance)  Discussed bariatric surgery as Ilan would likely be an excellent candidate- Ilan will look into this, has a lot of anxiety around any invasive procedures   Goals we discussed today:   Gradually increasing exercise throughout the week   Cut out all liquid sugar   [facility] pharmacy in 2 months to check in   Follow up with Laura in 3 months   Dietician appointment to be scheduled asap  Sleep medicine referral placed given BMI, dyspnea with sleeping on back, snoring   Mental health referral placed       Information about Video Visits with [facility]: video visit information  _________________________________________________________________________________________________________________________________________________________  If you are asked by your clinic team to have your blood pressure checked:  [facility] do offer several locations for blood pressure checks. Please follow the below link to schedule an appointment. Scheduling an appointment at the pharmacy for a blood pressure check is now preferred.    Appointment Plus " ([email or web address])  _________________________________________________________________________________________________________________________________________________________  Important contact and scheduling information:  Please call our contact center at [phone] to schedule your next appointments.  To find a lab location near you, please call [phone].  For any nursing questions or concerns call Emy Wall LPN at [phone] or Elizabeth Avilez RN at [phone]  Please call during clinic hours Monday through Friday 8:00a - 4:00p if you have questions or you can contact us via [employer] at anytime and we will reply during clinic hours.    Lab results will be communicated through My Chart or letter (if My Chart not used). Please call the clinic if you have not received communication after 1 week or if you have any questions.?  Clinic Fax: [phone]    _________________________________________________________________________________________________________________________________________________________  Meal Replacement Products:    Here is the link to our new e-store where you can purchase our meal replacement products    [facility] E-Store  [email or web address]    The one week starter kit is a great way to sample a variety of products and see what works for you.    If you want more information about the product go to: [employer] [email or web address]  [email or web address]    If you are an employee or [facility] or [facility] please contact your care team for a 10% estore discount    Free Shipping for orders over $75     Benefits of meal replacements products:    Portion and calorie control  Improved nutrition  Structured eating  Simplified food choices  Avoid contact with trigger foods  _________________________________________________________________________________________________________________________________________________________  Interested in working with a health  ?  Health coaches work with you to improve your overall health and wellbeing.  They look at the whole person, and may involve discussion of different areas of life, including, but not limited to the four pillars of health (sleep, exercise, nutrition, and stress management). Discuss with your care team if you would like to start working a health .  Health Coaching-3 Pack: Schedule by calling [phone]    $99 for three health coaching visits    Visits may be done in person or via phone    Coaching is a partnership between the  and the client; Coaches do not prescribe or diagnose    Coaching helps inspire the client to reach his/her personal goals   _________________________________________________________________________________________________________________________________________________________  24 Week Healthy Lifestyle Plan:    Our mission in the 24-week Healthy Lifestyle Plan is to provide you with individualized care by giving you the tools, education and support you need to lose weight and maintain a healthy lifestyle. In your 24-week journey, you ll be supported by a dedicated weight loss team that includes registered dietitians, medical weight management providers, health coaches, and nurses -- all with special expertise in weight loss -- to help you every step of the way.     Monthly meetings with your registered dietician or medical weight management provider help to review your progress, update your care plan, and make any adjustments needed to ensure success. Between these visits, weekly and bi-weekly health  visits will help you focus on the four pillars of weight loss -- stress, sleep, nutrition, and exercise -- and how you can best adapt each to achieve sustainable weight loss results.    In addition, you will be given exclusive access to online wellbeing classes through [employer].  Your initial visit will be with a medical weight management provider who will help to  understand your weight loss goals and ensure this program is the right fit for you. Please let our team know if you are interested in the 24 week plan by sending a message to your care team or calling [phone] to schedule.  _________________________________________________________________________________________________________________________________________________________  __________  [facility] Get Moving Program  Our team of physical therapists is trained to help you understand and take control of your condition. They will perform a thorough evaluation to determine your ability for activity and develop a customized plan to fit your goals and physical ability.  Scheduling: Unsure if the Get Moving program is right for you? Discuss the program with your medical provider or diabetes educator. You can also call us at [phone] to ask questions or schedule an appointment.   [facility] Get Moving Program  ____________________________________________________________________________________________________________________________________________________________________________  [facility] Diabetes Prevention Program (DPP)  If you have prediabetes and Medicare please contact us via [employer] to learn more about the Diabetes Prevention Program (DPP)  Program Details:   [facility] offers the year-long Diabetes Prevention Program (DPP). The program helps you to make lifestyle changes that prevent or delay type 2 diabetes by supporting healthy eating, increased physical activity, stress reduction and use of coping skills.   On average, previous [facility] DPP cohorts have lost and maintained at least 5% of their starting weight throughout the program and averaged more than 150 minutes of physical activity per week.  Participants meet weekly for one-hour group sessions over sixteen weeks, every other week for the next 8 weeks, and monthly for the last six months.   A year-long  maintenance program is also available for participants who complete the first year.   Location & Cost:   During the COVID-19 Public Health Emergency, the program is offered virtually. When in-person classes can resume, they will be held at [facility].  For people with Medicare, the program is covered in full. A self-pay option will also be available for those with non-Medicare insurance plans.   ______________________________________________________________________________________________________________________________________________________________________________________________________________________________    To work with a Behavioral Health Psychologist:    Call to schedule:    Ismael Sanabria - [phone]  Gin Mcdonough - [phone]  Thi Grove - [phone]  Thuy Khan - [phone]   Nubia Russell PhD (cannot accept Medicare) [phone]        Thank you,   [facility] Comprehensive Weight Management Team

## 2024-11-14 NOTE — NURSING NOTE
Current patient location: [address] [place] [address] [facility] [place] [address]    Is the patient currently in the state of [place]? YES    Visit mode:VIDEO    If the visit is dropped, the patient can be reconnected by:VIDEO VISIT: Text to cell phone:   Telephone Information:   Mobile [phone]       Will anyone else be joining the visit? NO  (If patient encounters technical issues they should call [phone])    Are changes needed to the allergy or medication list? No, Pt stated no changes to allergies, and Pt stated no med changes    Are refills needed on medications prescribed by this physician? Discuss with provider    Rooming Documentation:  Questionnaire(s) completed    Reason for visit: Consult (LINA)    Zoe WAN

## 2024-11-25 ENCOUNTER — TELEPHONE (OUTPATIENT)
Dept: ENDOCRINOLOGY | Facility: CLINIC | Age: 44
End: 2024-11-25
Payer: COMMERCIAL

## 2024-11-25 NOTE — TELEPHONE ENCOUNTER
Left Voicemail (1st Attempt) and Sent Mychart (1st Attempt) for the patient to call back and schedule the following:    Appointment type: Return Weight Management  Appointment mode: In-Person or Virtual Visit  Provider: Laura Cramer  Return date: Approx. 2/14/25  Specialty phone number: [phone]    Appointment type: Weight Management Nutrition   Appointment mode: Virtual Visit  Provider: Dietician  Return date: First available  Specialty phone number: [phone]

## 2024-11-26 ASSESSMENT — ANXIETY QUESTIONNAIRES: GAD7 TOTAL SCORE: 0

## 2024-11-26 ASSESSMENT — PATIENT HEALTH QUESTIONNAIRE - PHQ9: SUM OF ALL RESPONSES TO PHQ QUESTIONS 1-9: 0

## 2024-12-05 DIAGNOSIS — E66.01 MORBID OBESITY (H): ICD-10-CM

## 2024-12-05 NOTE — TELEPHONE ENCOUNTER
Pt is requesting new rx for the 0.25mg due to the 0.5mg is on back order. Please verify and send new rx. Thank you!!

## 2024-12-06 ENCOUNTER — TELEPHONE (OUTPATIENT)
Dept: FAMILY MEDICINE | Facility: CLINIC | Age: 44
End: 2024-12-06
Payer: COMMERCIAL

## 2025-01-03 ENCOUNTER — MYC REFILL (OUTPATIENT)
Dept: FAMILY MEDICINE | Facility: CLINIC | Age: 45
End: 2025-01-03
Payer: COMMERCIAL

## 2025-01-03 DIAGNOSIS — I10 ESSENTIAL HYPERTENSION: ICD-10-CM

## 2025-01-03 DIAGNOSIS — E78.5 DYSLIPIDEMIA: ICD-10-CM

## 2025-01-03 DIAGNOSIS — E66.01 MORBID OBESITY (H): ICD-10-CM

## 2025-01-07 RX ORDER — HYDROCHLOROTHIAZIDE 25 MG/1
25 TABLET ORAL DAILY
Qty: 90 TABLET | Refills: 3 | OUTPATIENT
Start: 2025-01-07

## 2025-01-07 RX ORDER — LOSARTAN POTASSIUM 25 MG/1
25 TABLET ORAL 2 TIMES DAILY
Qty: 180 TABLET | Refills: 3 | OUTPATIENT
Start: 2025-01-07

## 2025-01-07 RX ORDER — ROSUVASTATIN CALCIUM 5 MG/1
5 TABLET, COATED ORAL DAILY
Qty: 90 TABLET | Refills: 3 | OUTPATIENT
Start: 2025-01-07

## 2025-01-08 ENCOUNTER — MYC MEDICAL ADVICE (OUTPATIENT)
Dept: PHARMACY | Facility: CLINIC | Age: 45
End: 2025-01-08
Payer: COMMERCIAL

## 2025-01-09 NOTE — TELEPHONE ENCOUNTER
Called [facility] to investigate fill status of Wegovy.    Spoke to Paying, pharm tech re: confusion in dosing. Transferred to PharmD Alonzo to discuss as well. PharmD found 0.5 mg 90 day (3 month) was cancelled at pharmacy for unknown reason. PharmD to look into how may be able to get this med for patient. Will reach out to Medication Therapy Management with resolution.    Viviana Campoverde, Pharm D., MPH    Medication Therapy Management Pharmacist   [facility]

## 2025-03-03 NOTE — PROGRESS NOTES
Outpatient Sleep Medicine Consultation:      Name: Ilan Matthew MRN# [id number]   Age: 45 year old YOB: 1980     Date of Consultation: March 3, 2025  Consultation is requested by: Laura Cramer PA-C  [address] [address] Laura Cramer  Primary care provider: Bria Casey       Reason for Sleep Consult:     Ilan Matthew is sent by Laura Cramer for a sleep consultation regarding HTN, BMI >50.    Patient s Reason for visit  Ilan Matthew main reason for visit:    Patient states problem(s) started:    Ilan Matthew's goals for this visit:             Assessment and Plan:     Summary Sleep Diagnoses and Recommendations:  (R06.83) Snoring  (primary encounter diagnosis), (I10) Essential hypertension, (E66.813,  E66.01,  Z68.43) Class 3 severe obesity with serious comorbidity and body mass index (BMI) of 50.0 to 59.9 in adult, unspecified obesity type (H)  Comment: Ilan was referred by his weight management provider due to observed snoring, feeling short of breath if laying on his back (which he avoids), HTN and BMI 56. He does take 2-3 hour naps occasionally, but sleepiness is difficult to gauge as he has a substantial sleep debt from long work hours.  His ESS is normal at 3/24. His STOP BANG is 5: snoring, observed apnea, HTN, BMI >35 (56), male gender.  He does not have observed apnea or age >50 (45). There is risk for hypoventilation given his BMI even though his CO2 on metabolic panel has been no higher than 26 and SpO2 is 98% in the day.   Plan: Split PSG with TCM      (G47.26) Shift work sleep disorder  Comment: Ilan works 16 hour shifts from 11 PM to 3:30 PM. He works 7 on and then has 7 off. On days off, he sleeps at night. He estimates he gets 2-4 hours of sleep on workdays. He denies drowsiness while driving. This is causing a severe sleep debt and is likely contributing to his HTN,weight gain and depression.  Plan: We discussed the  correlation between sleep deprivation and his other health issues. I asked how long he feels he can continue to keep this schedule and he said he did not know.        Comorbid Diagnoses:  Patient Active Problem List   Diagnosis    Essential hypertension    Morbid obesity (H) BMI 56    Mild episode of recurrent major depressive disorder        Summary Counseling:    Sleep Testing Reviewed  Obstructive Sleep Apnea Reviewed  Complications of Untreated Sleep Apnea Reviewed      Patient will follow up 3 months after sleep study. We [facility] and initiate treatment before the follow up if indicated.  Bennett Goltz, PA-C      Total time spent reviewing medical records, history and physical examination, review of previous testing and interpretation as well as documentation on this date:48 min    CC: Laura Cramer          History of Present Illness:     Ilan Matthew presents with concerns of sleep apnea at the request of his weight management provider. He wakes to urinate 2-3 times per night, which he attributes to hydrochlorothiazide.   He has a stressful job, working long hours. He works about 16 hours 7 days in a row and is then off 7 days. He sleeps about 2-4 hours when working, and 6-8 hours when off of work.   He works at [employer] in [employer].       CO2 on metabolic panel has been 26 or lower in the last 2 years.    Past Sleep Evaluations: None     SLEEP-WAKE SCHEDULE:     Work/School Days: Patient goes to school/work:     Usually gets into bed at     Takes patient about   to fall asleep  Has trouble falling asleep 0  nights per week  Wakes up in the middle of the night  2-3 times.  Wakes up due to   restroom  He has trouble falling back asleep 30 min  times a week.   It usually takes   to get back to sleep  Patient is usually up at    Uses alarm:      Weekends/Non-work Days/All Other Days:  Usually gets into bed at   8-9 PM  Takes patient about  5-10 min to fall  "asleep  Patient is usually up at  6:30-7 AM  Uses alarm:   yes, to get kids off to school    Sleep Need  Patient gets  2-4 hours  sleep on average during work weeks and 6-8 hours on days off.   Patient thinks he needs about   sleep    Ilan Matthew prefers to sleep in this position(s):   sides, stomach  Patient states they do the following activities in bed:   TV sometimes    Naps  Patient takes a purposeful nap   times a week and naps are usually   in duration. He is exhausted and will nap on his first day off of work. On other days, he sometimes naps for 2-3 hours if not doing anything.   He feels better after a nap:   yes  He dozes off unintentionally  \"not really\" days per week  Patient has had a driving accident or near-miss due to sleepiness/drowsiness:   no      SLEEP DISRUPTIONS:    Breathing/Snoring  Patient snores:  yes  Other people complain about his snoring:    Patient has been told he stops breathing in his sleep:  only if he sleeps on his back  He has issues with the following:  . Morning headaches: if he binge watches TV. Nocturnal heartburn or reflux: if goes to bed after over-eating. Nasal congestion at night: no.    Movement:  Patient gets pain, discomfort, with an urge to move:   No restless legs symptoms  It happens when he is resting:     It happens more at night:     Patient has been told he kicks his legs at night:    no     Behaviors in Sleep:  Ilan Matthew has experienced the following behaviors while sleeping:    Pt denies bruxism, sleep talking, sleep walking, and dream enactment behavior. Pt denies sleep paralysis, hypnagogue and cataplexy.       Is there anything else you would like your sleep provider to know:        CAFFEINE AND OTHER SUBSTANCES:    Patient consumes caffeinated beverages per day:    pop 2-3 bottles per week when working  Last caffeine use is usually:    List of any prescribed or over the counter stimulants that patient takes:   none  List of any prescribed or " over the counter sleep medication patient takes:   none  List of previous sleep medications that patient has tried:   none  Patient drinks alcohol to help them sleep:  no  Patient drinks alcohol near bedtime:  no    Family History:  Patient has a family member been diagnosed with a sleep disorder:      None known    Social History:  7 AM to 3:30 PM and 11 PM to 3:30 PM. He is only home 4-10 PM. He has been doing that for over 10 years.     SCALES:    EPWORTH SLEEPINESS SCALE         3/4/2025     3:08 PM    [place] Sleepiness Scale ( GLENN Guerra  [id number]<br>ESS - [place]/English - Final version - 21 Nov 07 - [facility] [place].)   Sitting and reading Would never doze   Watching TV Would never doze   Sitting, inactive in a public place (e.g. a theatre or a meeting) Would never doze   As a passenger in a car for an hour without a break Would never doze   Lying down to rest in the afternoon when circumstances permit High chance of dozing   Sitting and talking to someone Would never doze   Sitting quietly after a lunch without alcohol Would never doze   In a car, while stopped for a few minutes in traffic Would never doze   [place] Score (MC) 3   [place] Score (Sleep) 3        Proxy-reported         INSOMNIA SEVERITY INDEX (CANDIE)          3/4/2025     3:07 PM   Insomnia Severity Index (CANDIE)   Difficulty falling asleep 0    Difficulty staying asleep 0    Problems waking up too early 2    How SATISFIED/DISSATISFIED are you with your CURRENT sleep pattern? 2    How NOTICEABLE to others do you think your sleep problem is in terms of impairing the quality of your life? 0    How WORRIED/DISTRESSED are you about your current sleep problem? 0    To what extent do you consider your sleep problem to INTERFERE with your daily functioning (e.g. daytime fatigue, mood, ability to function at work/daily chores, concentration, memory, mood, etc.) CURRENTLY? 0    CANDIE Total Score 4        Proxy-reported       Guidelines for  "Scoring/Interpretation:  Total score categories:  0-7 = No clinically significant insomnia   8-14 = Subthreshold insomnia   15-21 = Clinical insomnia (moderate severity)  22-28 = Clinical insomnia (severe)  Used via courtesy of www.[employer].va.gov with permission from Ronny Kahn PhD., [facility]      STOP BANG         3/4/2025     3:04 PM   STOP BANG Questionnaire (  2008, the American Society of Anesthesiologists, Inc. [employer]   B/P Clinic: --   BMI Clinic: 56.38         GAD7        2/28/2023     3:22 PM   MICHELLE-7    1. Feeling nervous, anxious, or on edge 0   2. Not being able to stop or control worrying 0   3. Worrying too much about different things 0   4. Trouble relaxing 0   5. Being so restless that it is hard to sit still 0   6. Becoming easily annoyed or irritable 0   7. Feeling afraid, as if something awful might happen 0   MICHELLE-7 Total Score 0   If you checked any problems, how difficult have they made it for you to do your work, take care of things at home, or get along with other people? Not difficult at all         CAGE-AID         No data to display                CAGE-AID reprinted with permission from the [place] Medical Journal, MINDY Arreguin. and WHIT Lopez, \"Conjoint screening questionnaires for alcohol and drug abuse\" [place] Medical Journal 94: 135-140, 1995.      PATIENT HEALTH QUESTIONNAIRE-9 (PHQ - 9)        8/20/2024     1:44 PM   PHQ-9 ([employer])   1.  Little interest or pleasure in doing things 1   2.  Feeling down, depressed, or hopeless 1   3.  Trouble falling or staying asleep, or sleeping too much 0   4.  Feeling tired or having little energy 0   5.  Poor appetite or overeating 0   6.  Feeling bad about yourself - or that you are a failure or have let yourself or your family down 0   7.  Trouble concentrating on things, such as reading the newspaper or watching television 0   8.  Moving or speaking so slowly that other people could have noticed. Or " the opposite - being so fidgety or restless that you have been moving around a lot more than usual 0   9.  Thoughts that you would be better off dead, or of hurting yourself in some way 0   PHQ-9 Total Score 2   6.  Feeling bad about yourself 0   7.  Trouble concentrating 0   8.  Moving slowly or restless 0   9.  Suicidal or self-harm thoughts 0   1.  Little interest or pleasure in doing things Several days   2.  Feeling down, depressed, or hopeless Several days   3.  Trouble falling or staying asleep, or sleeping too much Not at all   4.  Feeling tired or having little energy Not at all   5.  Poor appetite or overeating Not at all   6.  Feeling bad about yourself Not at all   7.  Trouble concentrating Not at all   8.  Moving slowly or restless Not at all   9.  Suicidal or self-harm thoughts Not at all   PHQ-9 via [employer] TOTAL SCORE-----> 2 (Minimal depression)   Difficulty at work, home, or with people Not difficult at all       Developed by Arabella Marti, Thuy Knott, Yonathan Amaral and colleagues, with an educational loretta from [employer] No permission required to reproduce, translate, display or distribute.        Allergies:    Allergies   Allergen Reactions    Fish Oil [Omega-3 Fatty Acids] Other (See Comments)     Swollen tongue       Medications:    Current Outpatient Medications   Medication Sig Dispense Refill    hydrochlorothiazide (HYDRODIURIL) 25 MG tablet Take 1 tablet (25 mg) by mouth daily. 90 tablet 3    losartan (COZAAR) 25 MG tablet Take 1 tablet (25 mg) by mouth 2 times daily. 180 tablet 3    rosuvastatin (CRESTOR) 5 MG tablet Take 1 tablet (5 mg) by mouth daily. 90 tablet 3    semaglutide-weight management (WEGOVY) 0.25 MG/0.5ML pen Inject 0.5 mLs (0.25 mg) subcutaneously once a week. For 4 weeks. 2 mL 0    Semaglutide-Weight Management (WEGOVY) 0.5 MG/0.5ML pen Inject 0.5 mg subcutaneously once a week. After completing 0.25 mg doses (start week 5). 6 mL 1       Problem  List:  Patient Active Problem List    Diagnosis Date Noted    Healthcare maintenance 03/07/2023     Priority: Medium     Colonscopy: never before referred 3/23  PSA: no family Hx      Mild episode of recurrent major depressive disorder 02/28/2023     Priority: Medium     Some days more down. Not a problem now.      Essential hypertension 04/03/2019     Priority: Medium     - BP goal:   - Currently taking meds: losartan 25 mg BID, hydrochlorothiazide 25 mg nightly  - Any side effects: cough with lisinopril  - Last BMP: 2/23- normal  - Any symptoms of HTN such as chest pain, headaches, vision changes, nausea: headaches if doesn't take hydrochlorothiazide at night    BP Readings from Last 6 Encounters:   03/07/23 120/70   02/28/23 124/78   09/19/22 126/74   06/16/22 128/80   01/06/22 128/72   12/15/21 136/80             Morbid obesity (H) 04/03/2019     Priority: Medium     Patient has tried phentermine. As well as another he cannot remember that was a pill. Had side effects. Now started wegovy by previous doctor. Wants ot work on diet and exercise and would maybe consider bariatric int he future. Will let me know          Past Medical/Surgical History:  Past Medical History:   Diagnosis Date    Arthritis     Hypertension     Obese      Past Surgical History:   Procedure Laterality Date    COLONOSCOPY N/A 5/16/2023    Procedure: COLONOSCOPY;  Surgeon: Nasir Varma MD;  Location: [facility] Main OR       Social History:  Social History     Socioeconomic History    Marital status:      Spouse name: Not on file    Number of children: Not on file    Years of education: Not on file    Highest education level: Not on file   Occupational History    Not on file   Tobacco Use    Smoking status: Never    Smokeless tobacco: Never   Vaping Use    Vaping status: Never Used   Substance and Sexual Activity    Alcohol use: Never    Drug use: Never    Sexual activity: Yes     Partners: Female     Birth control/protection:  None   Other Topics Concern    Not on file   Social History Narrative    Not on file     Social Drivers of Health     Financial Resource Strain: Low Risk  (9/17/2024)    Financial Resource Strain     Within the past 12 months, have you or your family members you live with been unable to get utilities (heat, electricity) when it was really needed?: No   Food Insecurity: Low Risk  (9/17/2024)    Food Insecurity     Within the past 12 months, did you worry that your food would run out before you got money to buy more?: No     Within the past 12 months, did the food you bought just not last and you didn t have money to get more?: No   Transportation Needs: Low Risk  (9/17/2024)    Transportation Needs     Within the past 12 months, has lack of transportation kept you from medical appointments, getting your medicines, non-medical meetings or appointments, work, or from getting things that you need?: No   Physical Activity: Insufficiently Active (9/17/2024)    Exercise Vital Sign     Days of Exercise per Week: 2 days     Minutes of Exercise per Session: 20 min   Stress: Stress Concern Present (9/17/2024)    [employer] [place] [employer] - Occupational Stress Questionnaire     Feeling of Stress : To some extent   Social Connections: Unknown (9/17/2024)    Social Connection and Isolation Panel [NHANES]     Frequency of Communication with Friends and Family: Not on file     Frequency of Social Gatherings with Friends and Family: Twice a week     Attends Taoism Services: Not on file     Active Member of Clubs or Organizations: Not on file     Attends Club or Organization Meetings: Not on file     Marital Status: Not on file   Interpersonal Safety: Low Risk  (9/17/2024)    Interpersonal Safety     Do you feel physically and emotionally safe where you currently live?: Yes     Within the past 12 months, have you been hit, slapped, kicked or otherwise physically hurt by someone?: No     Within the past 12 months,  "have you been humiliated or emotionally abused in other ways by your partner or ex-partner?: No   Housing Stability: Low Risk  (9/17/2024)    Housing Stability     Do you have housing? : Yes     Are you worried about losing your housing?: No       Family History:  Family History   Problem Relation Age of Onset    Hypertension Mother        Review of Systems:  A complete review of systems reviewed by me is negative with the exeption of what has been mentioned in the history of present illness.        Physical Examination:  Vitals: Ht 1.702 m (5' 7\")   Wt (!) 163.3 kg (360 lb)   BMI 56.38 kg/m    BMI= Body mass index is 56.38 kg/m .           GENERAL APPEARANCE: healthy, alert, no distress, cooperative, and fatigued  EYES: Eyes grossly normal to inspection  HENT: oropharynx crowded and tongue base enlarged and edges scalloped from teeth imprints  NECK: no asymmetry, masses, or scars  RESP: no apparent respiratory distress (retractions or difficulty speaking in full sentences), no audible wheeze or cough   Mallampati Class: IV.  Tonsillar Stage: not able to observe past tongue.         Data: All pertinent previous laboratory data reviewed     Recent Labs   Lab Test 09/17/24  1427 05/05/23  1649    140   POTASSIUM 3.8 4.0   CHLORIDE 102 104   CO2 25 24   ANIONGAP 11 12   GLC 91 81   BUN 12.6 13.8   CR 0.85 0.90   LAWANDA 9.4 9.6       Recent Labs   Lab Test 05/05/23  1649   WBC 7.2   RBC 5.21   HGB 14.7   HCT 44.7   MCV 86   MCH 28.2   MCHC 32.9   RDW 13.8          Recent Labs   Lab Test 09/17/24  1427   PROTTOTAL 7.4   ALBUMIN 4.1   BILITOTAL 0.6   ALKPHOS 102   AST 25   ALT 25       TSH (uIU/mL)   Date Value   02/28/2023 1.65       No results found for: \"UAMP\", \"UBARB\", \"BENZODIAZEUR\", \"UCANN\", \"UCOC\", \"OPIT\", \"UPCP\"    No results found for: \"IRONSAT\", \"DN01129\", \"SAMUEL\"    No results found for: \"PH\", \"PHARTERIAL\", \"PO2\", \"[id number]\", \"SAT\", \"PCO2\", \"HCO3\", \"BASEEXCESS\", \"OSMEL\", " "\"BEB\"    @LABRCNTIPR(phv:4,pco2v:4,po2v:4,hco3v:4,david:4,o2per:4)@    Echocardiology: No results found for this or any previous visit (from the past 4320 hours).    Chest x-ray: No results found for this or any previous visit from the past 365 days.      Chest CT: No results found for this or any previous visit from the past 365 days.      PFT: Most Recent [facility] Pulmonary Function Testing    No results found for: \"20001\"        Bennett Ezra Goltz, PA-C, BE[facility] 3/3/2025          "

## 2025-03-04 ENCOUNTER — VIRTUAL VISIT (OUTPATIENT)
Dept: SLEEP MEDICINE | Facility: CLINIC | Age: 45
End: 2025-03-04
Payer: COMMERCIAL

## 2025-03-04 VITALS — BODY MASS INDEX: 49.44 KG/M2 | HEIGHT: 67 IN | WEIGHT: 315 LBS

## 2025-03-04 DIAGNOSIS — I10 ESSENTIAL HYPERTENSION: ICD-10-CM

## 2025-03-04 DIAGNOSIS — E66.01 CLASS 3 SEVERE OBESITY WITH SERIOUS COMORBIDITY AND BODY MASS INDEX (BMI) OF 50.0 TO 59.9 IN ADULT, UNSPECIFIED OBESITY TYPE (H): ICD-10-CM

## 2025-03-04 DIAGNOSIS — E66.813 CLASS 3 SEVERE OBESITY WITH SERIOUS COMORBIDITY AND BODY MASS INDEX (BMI) OF 50.0 TO 59.9 IN ADULT, UNSPECIFIED OBESITY TYPE (H): ICD-10-CM

## 2025-03-04 DIAGNOSIS — G47.26 SHIFT WORK SLEEP DISORDER: ICD-10-CM

## 2025-03-04 DIAGNOSIS — R06.83 SNORING: Primary | ICD-10-CM

## 2025-03-04 PROCEDURE — 1126F AMNT PAIN NOTED NONE PRSNT: CPT | Mod: 95 | Performed by: PHYSICIAN ASSISTANT

## 2025-03-04 PROCEDURE — 98002 SYNCH AUDIO-VIDEO NEW MOD 45: CPT | Performed by: PHYSICIAN ASSISTANT

## 2025-03-04 ASSESSMENT — SLEEP AND FATIGUE QUESTIONNAIRES
HOW LIKELY ARE YOU TO NOD OFF OR FALL ASLEEP WHILE LYING DOWN TO REST IN THE AFTERNOON WHEN CIRCUMSTANCES PERMIT: HIGH CHANCE OF DOZING
HOW LIKELY ARE YOU TO NOD OFF OR FALL ASLEEP WHILE SITTING INACTIVE IN A PUBLIC PLACE: WOULD NEVER DOZE
HOW LIKELY ARE YOU TO NOD OFF OR FALL ASLEEP WHILE SITTING QUIETLY AFTER LUNCH WITHOUT ALCOHOL: WOULD NEVER DOZE
HOW LIKELY ARE YOU TO NOD OFF OR FALL ASLEEP WHEN YOU ARE A PASSENGER IN A CAR FOR AN HOUR WITHOUT A BREAK: WOULD NEVER DOZE
HOW LIKELY ARE YOU TO NOD OFF OR FALL ASLEEP IN A CAR, WHILE STOPPED FOR A FEW MINUTES IN TRAFFIC: WOULD NEVER DOZE
HOW LIKELY ARE YOU TO NOD OFF OR FALL ASLEEP WHILE SITTING AND READING: WOULD NEVER DOZE
HOW LIKELY ARE YOU TO NOD OFF OR FALL ASLEEP WHILE WATCHING TV: WOULD NEVER DOZE
HOW LIKELY ARE YOU TO NOD OFF OR FALL ASLEEP WHILE SITTING AND TALKING TO SOMEONE: WOULD NEVER DOZE

## 2025-03-04 ASSESSMENT — PAIN SCALES - GENERAL: PAINLEVEL_OUTOF10: NO PAIN (0)

## 2025-03-04 NOTE — NURSING NOTE
Current patient location: [address] [place] [address] [place] [facility] [address]    Is the patient currently in the state of [place]? YES    Visit mode: VIDEO    If the visit is dropped, the patient can be reconnected by:VIDEO VISIT: Text to cell phone:   Telephone Information:   Mobile [phone]       Will anyone else be joining the visit? NO  (If patient encounters technical issues they should call [phone]    Are changes needed to the allergy or medication list? No    Are refills needed on medications prescribed by this physician? NO    Rooming Documentation:  Questionnaire(s) completed    Reason for visit: Consult    [place] WAN

## 2025-03-04 NOTE — PROGRESS NOTES
Virtual Visit Details    Type of service:  Video Visit   Start Time: 3:25 PM   End Time: 3:55 PM    Originating Location (pt. Location): Home    Distant Location (provider location):  On-site  Platform used for Video Visit: Ana Maria

## 2025-03-04 NOTE — PATIENT INSTRUCTIONS
"          MY TREATMENT INFORMATION FOR SLEEP APNEA-  Ilan Matthew    DOCTOR : Bennett Goltz, PA-C    Am I having a sleep study at a sleep center?  --->Due to normal delays, you will be contacted within 2-4 weeks to schedule    Am I having a home sleep study?  --->Watch the video for the device you are using:    -/drop off device-   https://www.[employer].com/watch?v=yGGFBdELGhk    Frequently asked questions:  1. What is Obstructive Sleep Apnea (DAMARI)? DAMARI is the most common type of sleep apnea. Apnea means, \"without breath.\"  Apnea is most often caused by narrowing or collapse of the upper airway as muscles relax during sleep.   Almost everyone has occasional apneas. Most people with sleep apnea have had brief interruptions at night frequently for many years.  The severity of sleep apnea is related to how frequent and severe the events are.   2. What are the consequences of DAMARI? Symptoms include: feeling sleepy during the day, snoring loudly, gasping or stopping of breathing, trouble sleeping, and occasionally morning headaches or heartburn at night.  Sleepiness can be serious and even increase the risk of falling asleep while driving. Other health consequences may include development of high blood pressure and other cardiovascular disease in persons who are susceptible. Untreated DAMARI  can contribute to heart disease, stroke and diabetes.   3. What are the treatment options? In most situations, sleep apnea is a lifelong disease that must be managed with daily therapy. Medications are not effective for sleep apnea and surgery is generally not considered until other therapies have been tried. Your treatment is your choice. Continuous Positive Airway (CPAP) works right away and is the therapy that is effective in nearly everyone. An oral device to hold your jaw forward is usually the next most reliable option. Other options include postioning devices (to keep you off your back), weight loss, and surgery including " a tongue pacing device. There is more detail about some of these options below.  4. Are my sleep studies covered by insurance? Although we will request verification of coverage, we advise you also check in advance of the study to ensure there is coverage.    Important tips for those choosing CPAP and similar devices  REMEMBER-IF YOU RECEIVE A CALL FROM  [phone]-->IT IS TO SETUP A DEVICE  For new devices, sign up for device BERNARD to monitor your device for your followup visits  We encourage you to utilize the [employer] bernard or website ( https://[email or web address] ) to monitor your therapy progress and share the data with your healthcare team when you discuss your sleep apnea.                                                    Know your equipment:  CPAP is continuous positive airway pressure that prevents obstructive sleep apnea by keeping the throat from collapsing while you are sleeping. In most cases, the device is  smart  and can slowly self-adjusts if your throat collapses and keeps a record every day of how well you are treated-this information is available to you and your care team.  BPAP is bilevel positive airway pressure that keeps your throat open and also assists each breath with a pressure boost to maintain adequate breathing.  Special kinds of BPAP are used in patients who have inadequate breathing from lung or heart disease. In most cases, the device is  smart  and can slowly self-adjusts to assist breathing. Like CPAP, the device keeps a record of how well you are treated.  Your mask is your connection to the device. You get to choose what feels most comfortable and the staff will help to make sure if fits. Here: are some examples of the different masks that are available: Magnetic mask aids may assist with use but there are safety issues that should be addressed when considering with magnets* ( see end of discussion).       Key points to remember on your journey with sleep apnea:  Sleep study.   PAP devices often need to be adjusted during a sleep study to show that they are effective and adjusted right.  Good tips to remember: Try wearing just the mask during a quiet time during the day so your body adapts to wearing it. A humidifier is recommended for comfort in most cases to prevent drying of your nose and throat. Allergy medication from your provider may help you if you are having nasal congestion.  Getting settled-in. It takes more than one night for most of us to get used to wearing a mask. Try wearing just the mask during a quiet time during the day so your body adapts to wearing it. A humidifier is recommended for comfort in most cases. Our team will work with you carefully on the first day and will be in contact within 4 days and again at 2 and 4 weeks for advice and remote device adjustments. Your therapy is evaluated by the device each day.   Use it every night. The more you are able to sleep naturally for 7-8 hours, the more likely you will have good sleep and to prevent health risks or symptoms from sleep apnea. Even if you use it 4 hours it helps. Occasionally all of us are unable to use a medical therapy, in sleep apnea, it is not dangerous to miss one night.   Communicate. Call our skilled team on the number provided on the first day if your visit for problems that make it difficult to wear the device. Over 2 out of 3 patients can learn to wear the device long-term with help from our team. Remember to call our team or your sleep providers if you are unable to wear the device as we may have other solutions for those who cannot adapt to mask CPAP therapy. It is recommended that you sleep your sleep provider within the first 3 months and yearly after that if you are not having problems.   Use it for your health. We encourage use of CPAP masks during daytime quiet periods to allow your face and brain to adapt to the sensation of CPAP so that it will be a more natural sensation to awaken to at  night or during naps. This can be very useful during the first few weeks or months of adapting to CPAP though it does not help medically to wear CPAP during wakefulness and  should not be used as a strategy just to meet guidelines.  Take care of your equipment. Make sure you clean your mask and tubing using directions every day and that your filter and mask are replaced as recommended or if they are not working.     *Masks with magnets:  Updated Contraindications  Masks with magnetic components are contraindicated for use by patients where they, or anyone in close physical contact while using the mask, have the following:   Active medical implants that interact with magnets (i.e., pacemakers, implantable cardioverter defibrillators (ICD), neurostimulators, cerebrospinal fluid (CSF) shunts, insulin/infusion pumps)   Metallic implants/objects containing ferromagnetic material (i.e., aneurysm clips/flow disruption devices, embolic coils, stents, valves, electrodes, implants to restore hearing or balance with implanted magnets, ocular implants, metallic splinters in the eye)  Updated Warning  Keep the mask magnets at a safe distance of at least 6 inches (150 mm) away from implants or medical devices that may be adversely affected by magnetic interference. This warning applies to you or anyone in close physical contact with your mask. The magnets are in the frame and lower headgear clips, with a magnetic field strength of up to 400mT. When worn, they connect to secure the mask but may inadvertently detach while asleep.  Implants/medical devices, including those listed within contraindications, may be adversely affected if they change function under external magnetic fields or contain ferromagnetic materials that attract/repel to magnetic fields (some metallic implants, e.g., contact lenses with metal, dental implants, metallic cranial plates, screws, alexis hole covers, and bone substitute devices). Consult your physician  and  of your implant / other medical device for information on the potential adverse effects of magnetic fields.    BESIDES CPAP, WHAT OTHER THERAPIES ARE THERE?    Positioning Device  Positioning devices are generally used when sleep apnea is mild and only occurs on your back.This example shows a pillow that straps around the waist. It may be appropriate for those whose sleep study shows milder sleep apnea that occurs primarily when lying flat on one's back. Preliminary studies have shown benefit but effectiveness at home may need to be verified by a home sleep test. These devices are generally not covered by medical insurance.  Examples of devices that maintain sleeping on the back to prevent snoring and mild sleep apnea.    Belt type body positioner  http://[employer].DataContact/    Electronic reminder  http://nightshifttherapy.com/            Oral Appliance  What is oral appliance therapy?  An oral appliance device fits on your teeth at night like a retainer used after having braces. The device is made by a specialized dentist and requires several visits over 1-2 months before a manufactured device is made to fit your teeth and is adjusted to prevent your sleep apnea. Once an oral device is working properly, snoring should be improved. A home sleep test may be recommended at that time if to determine whether the sleep apnea is adequately treated.       Some things to remember:  -Oral devices are often, but not always, covered by your medical insurance. Be sure to check with your insurance provider.   -If you are referred for oral therapy, you will be given a list of specialized dentists to consider or you may choose to visit the Web site of the American Academy of Dental Sleep Medicine  -Oral devices are less likely to work if you have severe sleep apnea or are extremely overweight.     More detailed information  An oral appliance is a small acrylic device that fits over the upper and lower teeth  (similar to  a retainer or a mouth guard). This device slightly moves jaw forward, which moves the base of the tongue forward, opens the airway, improves breathing for effective treat snoring and obstructive sleep apnea in perhaps 7 out of 10 people .  The best working devices are custom-made by a dental device  after a mold is made of the teeth 1, 2, 3.  When is an oral appliance indicated?  Oral appliance therapy is recommended as a first-line treatment for patients with primary snoring, mild sleep apnea, and for patients with moderate sleep apnea who prefer appliance therapy to use of CPAP4, 5. Severity of sleep apnea is determined by sleep testing and is based on the number of respiratory events per hour of sleep.   How successful is oral appliance therapy?  The success rate of oral appliance therapy in patients with mild sleep apnea is 75-80% while in patients with moderate sleep apnea it is 50-70%. The chance of success in patients with severe sleep apnea is 40-50%. The research also shows that oral appliances have a beneficial effect on the cardiovascular health of DAMARI patients at the same magnitude as CPAP therapy7.  Oral appliances should be a second-line treatment in cases of severe sleep apnea, but if not completely successful then a combination therapy utilizing CPAP plus oral appliance therapy may be effective. Oral appliances tend to be effective in a broad range of patients although studies show that the patients who have the highest success are females, younger patients, those with milder disease, and less severe obesity. 3, 6.   Finding a dentist that practices dental sleep medicine  Specific training is available through the American Academy of Dental Sleep Medicine for dentists interested in working in the field of sleep. To find a dentist who is educated in the field of sleep and the use of oral appliances, near you, visit the Web site of the American Academy of Dental Sleep  Medicine.    References  1. Sissy et al. Objectively measured vs self-reported compliance during oral appliance therapy for sleep-disordered breathing. Chest 2013; 144(5): 8393-8901.  2. Vance et al. Objective measurement of compliance during oral appliance therapy for sleep-disordered breathing. Thorax 2013; 68(1): 91-96.  3. Patsy et al. Mandibular advancement devices in 620 men and women with DAMARI and snoring: tolerability and predictors of treatment success. Chest 2004; 125: 3972-1902.  4. Princess et al. Oral appliances for snoring and DAMARI: a review. Sleep 2006; 29: 244-262.  5. Prosper et al. Oral appliance treatment for DAMARI: an update. J Clin Sleep Med 2014; 10(2): 215-227.  6. Terry et al. Predictors of OSAH treatment outcome. J Dent Res 2007; 86: 5412-7571.      Weight Loss:   Your Body mass index is 56.38 kg/m .    Being overweight does not necessarily mean you will have health consequences.  Those who have BMI over 30 or over 27 with existing medical conditions carries greater risk.   Weight loss decreases severity of sleep apnea in most people with obesity. For those with mild obesity who have developed snoring with weight gain, even 15-30 pound weight loss can improve and occasionally milder eliminate sleep apnea.  Structured and life-long dietary and health habits are necessary to lose weight and keep healthier weight levels.     The Comprehensive Weight loss program offers all aspects of weight loss strategies including two Non-Surgical Weight Loss Programs: Medical Weight Management and our 24 Week Healthy Lifestyle Program:  Medical Weight Management: You will meet with a Medical Weight Management Provider, as well as a Registered Dietician. The program may include medication therapy, dietary education, recommended exercise and physical therapy programs, monthly support group meetings, and possible psychological counseling. Follow up visits with the provider or dietician are  scheduled based on your progress and needs.  24 Week Healthy Lifestyle Program: This unique program is designed to give you the support of weekly appointments and activities thru a 24-week period. It may include all of the components of the basic program (above), with the addition of 11 individual Health  Visits, 24-week access to the [employer] website for over 700 online classes, and monthly support group meetings. This program has an out-of-pocket expense of $499 to cover the items that can not be billed to insurance (health coaches and [employer] access), and is non-refundable/non-transferable (you may be able to use a Health Savings Account; ask your HSA provider). There may be an optional meal replacement plan prescribed as well.   Medication therapy has been approved for the treatment of sleep apnea: The FDA approved tirzepatide for moderate to severe sleep apnea (apnea-hypopnea index greater than or equal to 15) in patients with BMI of greater than or equal to 30, or BMI greater than or equal to 27 with at least 1 weight-related condition such as hypertension or dyslipidemia.  Surgical management achieves meaningful long-term weight loss and improvement in health risks in most patients with more severe obesity.      Sleep Apnea Surgery:    Surgery for obstructive sleep apnea is considered generally only when other therapies fail to work. Surgery may be discussed with you if you are having a difficult time tolerating CPAP and or when there is an abnormal structure that requires surgical correction.  Nose and throat surgeries often enlarge the airway to prevent collapse.  Most of these surgeries create pain for 1-2 weeks and up to half of the most common surgeries are not effective throughout life.  You should carefully discuss the benefits and drawbacks to surgery with your sleep provider and surgeon to determine if it is the best solution for you.   More information  Surgery for DAMARI is directed at areas  that are responsible for narrowing or complete obstruction of the airway during sleep.  There are a wide range of procedures available to enlarge and/or stabilize the airway to prevent blockage of breathing in the three major areas where it can occur: the palate, tongue, and nasal regions.  Successful surgical treatment depends on the accurate identification of the factors responsible for obstructive sleep apnea in each person.  A personalized approach is required because there is no single treatment that works well for everyone.  Because of anatomic variation, consultation with an examination by a sleep surgeon is a critical first step in determining what surgical options are best for each patient.  In some cases, examination during sedation may be recommended in order to guide the selection of procedures.  Patients will be counseled about risks and benefits as well as the typical recovery course after surgery. Surgery is typically not a cure for a person s DAMARI.  However, surgery will often significantly improve one s DAMARI severity (termed  success rate ).  Even in the absence of a cure, surgery will decrease the cardiovascular risk associated with OSA7; improve overall quality of life8 (sleepiness, functionality, sleep quality, etc).  Palate Procedures:  Patients with DAMARI often have narrowing of their airway in the region of their tonsils and uvula.  The goals of palate procedures are to widen the airway in this region as well as to help the tissues resist collapse.  Modern palate procedure techniques focus on tissue conservation and soft tissue rearrangement, rather than tissue removal.  Often the uvula is preserved in this procedure. Residual sleep apnea is common in patient after pharyngoplasty with an average reduction in sleep apnea events of 33%2.    Tongue Procedures:  ExamWhile patients are awake, the muscles that surround the throat are active and keep this region open for breathing. These muscles relax  during sleep, allowing the tongue and other structures to collapse and block breathing.  There are several different tongue procedures available.  Selection of a tongue base procedure depends on characteristics seen on physical exam.  Generally, procedures are aimed at removing bulky tissues in this area or preventing the back of the tongue from falling back during sleep.  Success rates for tongue surgery range from 50-62%3.  Hypoglossal Nerve Stimulation:  Hypoglossal nerve stimulation has recently received approval from the [place] Food and Drug Administration for the treatment of obstructive sleep apnea.  This is based on research showing that the system was safe and effective in treating sleep apnea6.  Results showed that the median AHI score decreased 68%, from 29.3 to 9.0. This therapy uses an implant system that senses breathing patterns and delivers mild stimulation to airway muscles, which keeps the airway open during sleep.  The system consists of three fully implanted components: a small generator (similar in size to a pacemaker), a breathing sensor, and a stimulation lead.  Using a small handheld remote, a patient turns the therapy on before bed and off upon awakening.    Candidates for this device must be greater than 18 years of age, have moderate to severe obstructive sleep apnea with less than 25% central events  (AHI between 15-65), BMI less than 35, have tried CPAP/oral appliance for at least 8 weeks without success, and have appropriate upper airway anatomy (determined by a sleep endoscopy performed by Dr. Jj Steward or Dr. Berny Vitale).     Nasal Procedures:  Nasal obstruction can interfere with nasal breathing during the day and night.  Studies have shown that relief of nasal obstruction can improve the ability of some patients to tolerate positive airway pressure therapy for obstructive sleep apnea1.  Treatment options include medications such as nasal saline, topical corticosteroid  and antihistamine sprays, and oral medications such as antihistamines or decongestants. Non-surgical treatments can include external nasal dilators for selected patients. If these are not successful by themselves, surgery can improve the nasal airway either alone or in combination with these other options.    Combination Procedures:  Combination of surgical procedures and other treatments may be recommended, particularly if patients have more than one area of narrowing or persistent positional disease.  The success rate of combination surgery ranges from 66-80%2,3.    References  Stoney MANZO. The Role of the Nose in Snoring and Obstructive Sleep Apnoea: An Update.  Eur Arch Otorhinolaryngol. 2011; 268: 1365-73.   Kimi SM; Trish JA; Jeremy JR; Pallanch JF; Trinity MB; Augustine SG; Juan A DUMONT. Surgical modifications of the upper airway for obstructive sleep apnea in adults: a systematic review and meta-analysis. SLEEP 2010;33(10):1327-4946. Socorro CARDOZA. Hypopharyngeal surgery in obstructive sleep apnea: an evidence-based medicine review.  Arch Otolaryngol Head Neck Surg. 2006 Feb;132(2):206-13.  Rolando YH1, Layne Y, Foster JOYA. The efficacy of anatomically based multilevel surgery for obstructive sleep apnea. Otolaryngol Head Neck Surg. 2003 Oct;129(4):327-35.  Socorro CARDOZA, Goldberg A. Hypopharyngeal Surgery in Obstructive Sleep Apnea: An Evidence-Based Medicine Review. Arch Otolaryngol Head Neck Surg. 2006 Feb;132(2):206-13.  Chang PJ et al. Upper-Airway Stimulation for Obstructive Sleep Apnea.  N Engl J Med. 2014 Jan 9;370(2):139-49.  Ghassan Y et al. Increased Incidence of Cardiovascular Disease in Middle-aged Men with Obstructive Sleep Apnea. Am J Respir Crit Care Med; 2002 166: 159-165  Albina EM et al. Studying Life Effects and Effectiveness of Palatopharyngoplasty (SLEEP) study: Subjective Outcomes of Isolated Uvulopalatopharyngoplasty. Otolaryngol Head Neck Surg. 2011; 144: 623-631.    WHAT IF I ONLY HAVE  SNORING?  Mandibular advancement devices, lateral sleep positioning, long-term weight loss and treatment of nasal allergies have been shown to improve snoring.  Exercising tongue muscles with a game (https://apps.GradeFund.FanSnap/us/bernard/soundly-reduce-snoring/rf2110191589) or stimulating the tongue during the day with a device (https://doi.org/10.3390/mgn82133608) have improved snoring in some individuals.  https://[email or web address].FanSnap/  https://www.sleepfoundation.org/best-anti-snoring-mouthpieces-and-mouthguards  Remember to Drive Safe... Drive Alive     Sleep health profoundly affects your health, mood, and your safety.  Thirty three percent of the population (one in three of us) is not getting enough sleep and many have a sleep disorder. Not getting enough sleep or having an untreated / undertreated sleep condition may make us sleepy without even knowing it. In fact, our driving could be dramatically impaired due to our sleep health. As your provider, here are some things I would like you to know about driving:     Here are some warning signs for impairment and dangerous drowsy driving:              -Having been awake more than 16 hours               -Looking tired               -Eyelid drooping              -Head nodding (it could be too late at this point)              -Driving for more than 30 minutes     Some things you could do to make the driving safer if you are experiencing some drowsiness:              -Stop driving and rest              -Call for transportation              -Make sure your sleep disorder is adequately treated     Some things that have been shown NOT to work when experiencing drowsiness while driving:              -Turning on the radio              -Opening windows              -Eating any  distracting  /  entertaining  foods (e.g., sunflower seeds, candy, or any other)              -Talking on the phone      Your decision may not only impact your life, but also the life of others. Please,  remember to drive safe for yourself and all of us.

## 2025-07-25 ENCOUNTER — TRANSFERRED RECORDS (OUTPATIENT)
Dept: HEALTH INFORMATION MANAGEMENT | Facility: CLINIC | Age: 45
End: 2025-07-25
Payer: COMMERCIAL

## 2025-08-18 ENCOUNTER — PATIENT OUTREACH (OUTPATIENT)
Dept: CARE COORDINATION | Facility: CLINIC | Age: 45
End: 2025-08-18
Payer: COMMERCIAL

## 2025-09-01 ENCOUNTER — PATIENT OUTREACH (OUTPATIENT)
Dept: CARE COORDINATION | Facility: CLINIC | Age: 45
End: 2025-09-01
Payer: COMMERCIAL

## (undated) RX ORDER — ONDANSETRON 2 MG/ML
INJECTION INTRAMUSCULAR; INTRAVENOUS
Status: DISPENSED
Start: 2023-05-16

## (undated) RX ORDER — LIDOCAINE HYDROCHLORIDE 10 MG/ML
INJECTION, SOLUTION EPIDURAL; INFILTRATION; INTRACAUDAL; PERINEURAL
Status: DISPENSED
Start: 2023-05-16

## (undated) RX ORDER — PROPOFOL 10 MG/ML
INJECTION, EMULSION INTRAVENOUS
Status: DISPENSED
Start: 2023-05-16